# Patient Record
Sex: MALE | Race: WHITE | NOT HISPANIC OR LATINO | Employment: OTHER | URBAN - METROPOLITAN AREA
[De-identification: names, ages, dates, MRNs, and addresses within clinical notes are randomized per-mention and may not be internally consistent; named-entity substitution may affect disease eponyms.]

---

## 2017-02-28 ENCOUNTER — TELEPHONE (OUTPATIENT)
Dept: HEMATOLOGY ONCOLOGY | Facility: MEDICAL CENTER | Age: 77
End: 2017-02-28

## 2017-02-28 ENCOUNTER — APPOINTMENT (OUTPATIENT)
Dept: ADMISSIONS | Facility: MEDICAL CENTER | Age: 77
End: 2017-02-28
Attending: COLON & RECTAL SURGERY
Payer: MEDICARE

## 2017-02-28 NOTE — TELEPHONE ENCOUNTER
Left message to schedule new hematology appointment for Dr. Wright. Dx: history of prostate cancer, abnormal labs. Ref: Judith Chong PA-C.

## 2017-03-01 ENCOUNTER — HOSPITAL ENCOUNTER (OUTPATIENT)
Facility: MEDICAL CENTER | Age: 77
End: 2017-03-03
Attending: COLON & RECTAL SURGERY | Admitting: COLON & RECTAL SURGERY
Payer: MEDICARE

## 2017-03-01 PROBLEM — K40.90 RIGHT INGUINAL HERNIA: Status: ACTIVE | Noted: 2017-03-01

## 2017-03-01 PROBLEM — K43.2 INCISIONAL HERNIA: Status: ACTIVE | Noted: 2017-03-01

## 2017-03-01 PROCEDURE — 502240 HCHG MISC OR SUPPLY RC 0272: Performed by: COLON & RECTAL SURGERY

## 2017-03-01 PROCEDURE — 501838 HCHG SUTURE GENERAL: Performed by: COLON & RECTAL SURGERY

## 2017-03-01 PROCEDURE — 700111 HCHG RX REV CODE 636 W/ 250 OVERRIDE (IP)

## 2017-03-01 PROCEDURE — 501574 HCHG TROCAR, SMTH CAN&SEAL 5: Performed by: COLON & RECTAL SURGERY

## 2017-03-01 PROCEDURE — 501497 HCHG SURGICLIP: Performed by: COLON & RECTAL SURGERY

## 2017-03-01 PROCEDURE — 160035 HCHG PACU - 1ST 60 MINS PHASE I: Performed by: COLON & RECTAL SURGERY

## 2017-03-01 PROCEDURE — 502571 HCHG PACK, LAP CHOLE: Performed by: COLON & RECTAL SURGERY

## 2017-03-01 PROCEDURE — 110371 HCHG SHELL REV 272: Performed by: COLON & RECTAL SURGERY

## 2017-03-01 PROCEDURE — A9270 NON-COVERED ITEM OR SERVICE: HCPCS

## 2017-03-01 PROCEDURE — 501570 HCHG TROCAR, SEPARATOR: Performed by: COLON & RECTAL SURGERY

## 2017-03-01 PROCEDURE — C1781 MESH (IMPLANTABLE): HCPCS | Performed by: COLON & RECTAL SURGERY

## 2017-03-01 PROCEDURE — A9270 NON-COVERED ITEM OR SERVICE: HCPCS | Performed by: PHYSICIAN ASSISTANT

## 2017-03-01 PROCEDURE — 160003 HCHG RECOVERY MINUTES (EXTENDED) STAT: Performed by: COLON & RECTAL SURGERY

## 2017-03-01 PROCEDURE — 501568 HCHG TROCAR, BLUNTPORT 12MM: Performed by: COLON & RECTAL SURGERY

## 2017-03-01 PROCEDURE — 700102 HCHG RX REV CODE 250 W/ 637 OVERRIDE(OP): Performed by: PHYSICIAN ASSISTANT

## 2017-03-01 PROCEDURE — 160039 HCHG SURGERY MINUTES - EA ADDL 1 MIN LEVEL 3: Performed by: COLON & RECTAL SURGERY

## 2017-03-01 PROCEDURE — 110382 HCHG SHELL REV 271: Performed by: COLON & RECTAL SURGERY

## 2017-03-01 PROCEDURE — 160002 HCHG RECOVERY MINUTES (STAT): Performed by: COLON & RECTAL SURGERY

## 2017-03-01 PROCEDURE — 700102 HCHG RX REV CODE 250 W/ 637 OVERRIDE(OP)

## 2017-03-01 PROCEDURE — 503332 HCHG DEVICE ABSORB STRAP FIXA: Performed by: COLON & RECTAL SURGERY

## 2017-03-01 PROCEDURE — 160009 HCHG ANES TIME/MIN: Performed by: COLON & RECTAL SURGERY

## 2017-03-01 PROCEDURE — G0378 HOSPITAL OBSERVATION PER HR: HCPCS

## 2017-03-01 PROCEDURE — 700101 HCHG RX REV CODE 250: Performed by: PHYSICIAN ASSISTANT

## 2017-03-01 PROCEDURE — 160036 HCHG PACU - EA ADDL 30 MINS PHASE I: Performed by: COLON & RECTAL SURGERY

## 2017-03-01 PROCEDURE — 160048 HCHG OR STATISTICAL LEVEL 1-5: Performed by: COLON & RECTAL SURGERY

## 2017-03-01 PROCEDURE — A4606 OXYGEN PROBE USED W OXIMETER: HCPCS | Performed by: COLON & RECTAL SURGERY

## 2017-03-01 PROCEDURE — 160028 HCHG SURGERY MINUTES - 1ST 30 MINS LEVEL 3: Performed by: COLON & RECTAL SURGERY

## 2017-03-01 PROCEDURE — 700101 HCHG RX REV CODE 250

## 2017-03-01 DEVICE — MESH VENTRALIGHT 4 X 6 INCH - (1EA/CA): Type: IMPLANTABLE DEVICE | Status: FUNCTIONAL

## 2017-03-01 DEVICE — MESH PERFIX PLUG LARGE - 4.1CM X 4.8CM (1EA/CA): Type: IMPLANTABLE DEVICE | Status: FUNCTIONAL

## 2017-03-01 DEVICE — MESH 3D MAX RIGHT 10.8 X 16CM - MED(1/CA): Type: IMPLANTABLE DEVICE | Status: FUNCTIONAL

## 2017-03-01 RX ORDER — TEMAZEPAM 15 MG/1
15 CAPSULE ORAL
Status: DISCONTINUED | OUTPATIENT
Start: 2017-03-01 | End: 2017-03-03 | Stop reason: HOSPADM

## 2017-03-01 RX ORDER — DEXTROSE MONOHYDRATE, SODIUM CHLORIDE, SODIUM LACTATE, POTASSIUM CHLORIDE, CALCIUM CHLORIDE 5; 600; 310; 179; 20 G/100ML; MG/100ML; MG/100ML; MG/100ML; MG/100ML
INJECTION, SOLUTION INTRAVENOUS CONTINUOUS
Status: DISCONTINUED | OUTPATIENT
Start: 2017-03-01 | End: 2017-03-03 | Stop reason: HOSPADM

## 2017-03-01 RX ORDER — OXYCODONE AND ACETAMINOPHEN 7.5; 325 MG/1; MG/1
1 TABLET ORAL EVERY 4 HOURS PRN
Status: DISCONTINUED | OUTPATIENT
Start: 2017-03-01 | End: 2017-03-03 | Stop reason: HOSPADM

## 2017-03-01 RX ORDER — ENALAPRILAT 1.25 MG/ML
2.5 INJECTION INTRAVENOUS EVERY 4 HOURS PRN
Status: DISCONTINUED | OUTPATIENT
Start: 2017-03-01 | End: 2017-03-03 | Stop reason: HOSPADM

## 2017-03-01 RX ORDER — BACITRACIN 50000 [IU]/1
INJECTION, POWDER, FOR SOLUTION INTRAMUSCULAR
Status: DISCONTINUED | OUTPATIENT
Start: 2017-03-01 | End: 2017-03-01 | Stop reason: HOSPADM

## 2017-03-01 RX ORDER — SODIUM CHLORIDE, SODIUM LACTATE, POTASSIUM CHLORIDE, CALCIUM CHLORIDE 600; 310; 30; 20 MG/100ML; MG/100ML; MG/100ML; MG/100ML
1000 INJECTION, SOLUTION INTRAVENOUS
Status: COMPLETED | OUTPATIENT
Start: 2017-03-01 | End: 2017-03-01

## 2017-03-01 RX ORDER — OXYCODONE HCL 5 MG/5 ML
SOLUTION, ORAL ORAL
Status: COMPLETED
Start: 2017-03-01 | End: 2017-03-01

## 2017-03-01 RX ORDER — ACETAMINOPHEN 650 MG/1
650 SUPPOSITORY RECTAL EVERY 6 HOURS PRN
Status: DISCONTINUED | OUTPATIENT
Start: 2017-03-01 | End: 2017-03-03 | Stop reason: HOSPADM

## 2017-03-01 RX ORDER — HYDROCODONE BITARTRATE AND ACETAMINOPHEN 5; 325 MG/1; MG/1
1-2 TABLET ORAL EVERY 4 HOURS PRN
Status: DISCONTINUED | OUTPATIENT
Start: 2017-03-01 | End: 2017-03-03 | Stop reason: HOSPADM

## 2017-03-01 RX ORDER — ONDANSETRON 2 MG/ML
4 INJECTION INTRAMUSCULAR; INTRAVENOUS EVERY 6 HOURS PRN
Status: DISCONTINUED | OUTPATIENT
Start: 2017-03-01 | End: 2017-03-03 | Stop reason: HOSPADM

## 2017-03-01 RX ORDER — DIPHENHYDRAMINE HYDROCHLORIDE 50 MG/ML
25-50 INJECTION INTRAMUSCULAR; INTRAVENOUS EVERY 6 HOURS PRN
Status: DISCONTINUED | OUTPATIENT
Start: 2017-03-01 | End: 2017-03-03 | Stop reason: HOSPADM

## 2017-03-01 RX ORDER — DIPHENHYDRAMINE HCL 25 MG
25-50 TABLET ORAL EVERY 6 HOURS PRN
Status: DISCONTINUED | OUTPATIENT
Start: 2017-03-01 | End: 2017-03-03 | Stop reason: HOSPADM

## 2017-03-01 RX ORDER — BUPIVACAINE HYDROCHLORIDE AND EPINEPHRINE 5; 5 MG/ML; UG/ML
INJECTION, SOLUTION EPIDURAL; INTRACAUDAL; PERINEURAL
Status: DISCONTINUED | OUTPATIENT
Start: 2017-03-01 | End: 2017-03-01 | Stop reason: HOSPADM

## 2017-03-01 RX ORDER — TEMAZEPAM 15 MG/1
30 CAPSULE ORAL
Status: DISCONTINUED | OUTPATIENT
Start: 2017-03-01 | End: 2017-03-03 | Stop reason: HOSPADM

## 2017-03-01 RX ADMIN — HYDROMORPHONE HYDROCHLORIDE 0.5 MG: 1 INJECTION, SOLUTION INTRAMUSCULAR; INTRAVENOUS; SUBCUTANEOUS at 16:38

## 2017-03-01 RX ADMIN — DEXTROSE MONOHYDRATE, SODIUM CHLORIDE, SODIUM LACTATE, POTASSIUM CHLORIDE, CALCIUM CHLORIDE: 5; 600; 310; 179; 20 INJECTION, SOLUTION INTRAVENOUS at 21:02

## 2017-03-01 RX ADMIN — FENTANYL CITRATE 50 MCG: 50 INJECTION, SOLUTION INTRAMUSCULAR; INTRAVENOUS at 16:27

## 2017-03-01 RX ADMIN — OXYCODONE AND ACETAMINOPHEN 1 TABLET: 7.5; 325 TABLET ORAL at 21:01

## 2017-03-01 RX ADMIN — SODIUM CHLORIDE, SODIUM LACTATE, POTASSIUM CHLORIDE, CALCIUM CHLORIDE 1000 ML: 600; 310; 30; 20 INJECTION, SOLUTION INTRAVENOUS at 12:45

## 2017-03-01 RX ADMIN — OXYCODONE HYDROCHLORIDE 10 MG: 5 SOLUTION ORAL at 16:27

## 2017-03-01 ASSESSMENT — PAIN SCALES - GENERAL
PAINLEVEL_OUTOF10: 3
PAINLEVEL_OUTOF10: 5
PAINLEVEL_OUTOF10: 7
PAINLEVEL_OUTOF10: 3
PAINLEVEL_OUTOF10: 2
PAINLEVEL_OUTOF10: 0
PAINLEVEL_OUTOF10: 4
PAINLEVEL_OUTOF10: 2
PAINLEVEL_OUTOF10: 4
PAINLEVEL_OUTOF10: 2
PAINLEVEL_OUTOF10: 3
PAINLEVEL_OUTOF10: 7
PAINLEVEL_OUTOF10: 3
PAINLEVEL_OUTOF10: 3

## 2017-03-01 ASSESSMENT — PATIENT HEALTH QUESTIONNAIRE - PHQ9
2. FEELING DOWN, DEPRESSED, IRRITABLE, OR HOPELESS: NOT AT ALL
SUM OF ALL RESPONSES TO PHQ QUESTIONS 1-9: 0
SUM OF ALL RESPONSES TO PHQ9 QUESTIONS 1 AND 2: 0
1. LITTLE INTEREST OR PLEASURE IN DOING THINGS: NOT AT ALL

## 2017-03-01 ASSESSMENT — LIFESTYLE VARIABLES
EVER_SMOKED: NEVER
ALCOHOL_USE: NO

## 2017-03-01 NOTE — IP AVS SNAPSHOT
3/3/2017          Wally Joel  [address]  [place] [address]    Dear Wally:    [facility] wants to ensure your discharge home is safe and you or your loved ones have had all your questions answered regarding your care after you leave the hospital.    You may receive a telephone call within two days of your discharge.  This call is to make certain you understand your discharge instructions as well as ensure we provided you with the best care possible during your stay with us.     The call will only last approximately 3-5 minutes and will be done by a nurse.    Once again, we want to ensure your discharge home is safe and that you have a clear understanding of any next steps in your care.  If you have any questions or concerns, please do not hesitate to contact us, we are here for you.  Thank you for choosing [facility] for your healthcare needs.    Sincerely,    Ke Bobby    [facility]

## 2017-03-01 NOTE — IP AVS SNAPSHOT
" <p align=\"LEFT\"><IMG SRC=\"[email or web address]\" alt=\"Image\" WIDTH=\"50%\" HEIGHT=\"200\" BORDER=\"\"></p>                   Name:Wally Joel  Medical Record Number:[id number]  CSN: [id number]    YOB: 1940   Age: 76 y.o.  Sex: male  HT:1.651 m (5' 5\") WT: 79.4 kg (175 lb 0.7 oz)          Admit Date: 3/1/2017     Discharge Date:   Today's Date: 3/3/2017  Attending Doctor:  Kory Suero M.D.                  Allergies:  Ciprofloxacin          Follow-up Information     1. Follow up with Kory Suero M.D.. Schedule an appointment as soon as possible for a visit in 2 weeks.    Specialties:  Surgery, Radiology    Contact information    [address]  [facility] [address]  [phone]           Medication List      Take these Medications        Instructions    ADVIL PO    Take 1 tablet by mouth as needed.   Dose:  1 tablet       hydrocodone-acetaminophen 5-325 MG Tabs per tablet   Commonly known as:  NORCO    Take 1-2 Tabs by mouth every four hours as needed (pain).   Dose:  1-2 Tab       VITAMIN B 12 PO    Take  by mouth as needed.       VITAMIN C PO    Take  by mouth as needed.         "

## 2017-03-01 NOTE — IP AVS SNAPSHOT
" Home Care Instructions                                                                                                                  Name:Wally Joel  Medical Record Number:[id number]  CSN: [id number]    YOB: 1940   Age: 76 y.o.  Sex: male  HT:1.651 m (5' 5\") WT: 79.4 kg (175 lb 0.7 oz)          Admit Date: 3/1/2017     Discharge Date:   Today's Date: 3/3/2017  Attending Doctor:  Kory Suero M.D.                  Allergies:  Ciprofloxacin            Discharge Instructions       Discharge Instructions      1. ACTIVITIES: Upon discharge from the hospital, the day of surgery it is requested that you do no significant physical activity and limit mental activities, as you have had sedation. The day after surgery, you may resume activities of daily living, but for four weeks, it is recommended that you do no strenuous activities or heavy lifting (greater than 15 pounds).     2. DRIVING: You may drive whenever you are off pain medications and are able to perform the activities needed to drive, i.e. turning, bending, twisting, etc.     3. WOUND: It is not unusual for patients to experience swelling and even bruising at the hernia repair site.     4. ICE: please use ice on the wound to decrease the swelling for the first 24 hours and then discontinue.     5. BATHING: The dressing can be removed two days after surgery and you may then allow the wound to get wet in a shower as normal, but avoid submersion in water (tub bath) for at least a week. Please leave the steri-strips in place, they will fall off over 5-7 days.     6. PAIN MEDICATION: You will be given a prescription for pain medication at discharge. Please take these as directed. It is important to remember not to take medications on an empty stomach as this may cause nausea.     7. BOWEL FUNCTION: After hernia repair, it is not uncommon for patients to experience constipation. This is due to decreasing activity levels as well as pain " medications. You may wish to use a stool softener beginning immediately after surgery, and you may or may not need to use a laxative (Miralax, Milk of Magnesia, Ex-lax; Senokot, etc.) as well.     8. CALL IF YOU HAVE: (1) Fevers to more than 101.00 F, (2) Unusual chest or leg pain, (3) Drainage or fluid from incision that may be foul smelling, increased tenderness or soreness at the wound or the wound edges are no longer together, redness or swelling at the incision site. Please do not hesitate to call with any other questions.     9. APPOINTMENT: Contact our office at [phone] for a follow-up appointment in 1 to 2 weeks following your procedure.     If you have any additional questions, please do not hesitate to call the office and speak to either myself or the physician on call.     Inguinal Hernia, Adult  Muscles help keep everything in the body in its proper place. But if a weak spot in the muscles develops, something can poke through. That is called a hernia. When this happens in the lower part of the belly (abdomen), it is called an inguinal hernia. (It takes its name from a part of the body in this region called the inguinal canal.) A weak spot in the wall of muscles lets some fat or part of the small intestine bulge through. An inguinal hernia can develop at any age. Men get them more often than women.  CAUSES   In adults, an inguinal hernia develops over time.  · It can be triggered by:  ¨ Suddenly straining the muscles of the lower abdomen.  ¨ Lifting heavy objects.  ¨ Straining to have a bowel movement. Difficult bowel movements (constipation) can lead to this.  ¨ Constant coughing. This may be caused by smoking or lung disease.  ¨ Being overweight.  ¨ Being pregnant.  ¨ Working at a job that requires long periods of standing or heavy lifting.  ¨ Having had an inguinal hernia before.  One type can be an emergency situation. It is called a strangulated inguinal hernia. It develops if part of the small  intestine slips through the weak spot and cannot get back into the abdomen. The blood supply can be cut off. If that happens, part of the intestine may die. This situation requires emergency surgery.  SYMPTOMS   Often, a small inguinal hernia has no symptoms. It is found when a healthcare provider does a physical exam. Larger hernias usually have symptoms.   · In adults, symptoms may include:  ¨ A lump in the groin. This is easier to see when the person is standing. It might disappear when lying down.  ¨ In men, a lump in the scrotum.  ¨ Pain or burning in the groin. This occurs especially when lifting, straining or coughing.  ¨ A dull ache or feeling of pressure in the groin.  · Signs of a strangulated hernia can include:  ¨ A bulge in the groin that becomes very painful and tender to the touch.  ¨ A bulge that turns red or purple.  ¨ Fever, nausea and vomiting.  ¨ Inability to have a bowel movement or to pass gas.  DIAGNOSIS   To decide if you have an inguinal hernia, a healthcare provider will probably do a physical examination.  · This will include asking questions about any symptoms you have noticed.  · The healthcare provider might feel the groin area and ask you to cough. If an inguinal hernia is felt, the healthcare provider may try to slide it back into the abdomen.  · Usually no other tests are needed.  TREATMENT   Treatments can vary. The size of the hernia makes a difference. Options include:  · Watchful waiting. This is often suggested if the hernia is small and you have had no symptoms.  ¨ No medical procedure will be done unless symptoms develop.  ¨ You will need to watch closely for symptoms. If any occur, contact your healthcare provider right away.  · Surgery. This is used if the hernia is larger or you have symptoms.  ¨ Open surgery. This is usually an outpatient procedure (you will not stay overnight in a hospital). An cut (incision) is made through the skin in the groin. The hernia is put back  "inside the abdomen. The weak area in the muscles is then repaired by herniorrhaphy or hernioplasty. Herniorrhaphy: in this type of surgery, the weak muscles are sewn back together. Hernioplasty: a patch or mesh is used to close the weak area in the abdominal wall.  ¨ Laparoscopy. In this procedure, a surgeon makes small incisions. A thin tube with a tiny video camera (called a laparoscope) is put into the abdomen. The surgeon repairs the hernia with mesh by looking with the video camera and using two long instruments.  HOME CARE INSTRUCTIONS   · After surgery to repair an inguinal hernia:  ¨ You will need to take pain medicine prescribed by your healthcare provider. Follow all directions carefully.  ¨ You will need to take care of the wound from the incision.  ¨ Your activity will be restricted for awhile. This will probably include no heavy lifting for several weeks. You also should not do anything too active for a few weeks. When you can return to work will depend on the type of job that you have.  · During \"watchful waiting\" periods, you should:  ¨ Maintain a healthy weight.  ¨ Eat a diet high in fiber (fruits, vegetables and whole grains).  ¨ Drink plenty of fluids to avoid constipation. This means drinking enough water and other liquids to keep your urine clear or pale yellow.  ¨ Do not lift heavy objects.  ¨ Do not stand for long periods of time.  ¨ Quit smoking. This should keep you from developing a frequent cough.  SEEK MEDICAL CARE IF:   · A bulge develops in your groin area.  · You feel pain, a burning sensation or pressure in the groin. This might be worse if you are lifting or straining.  · You develop a fever of more than 100.5° F (38.1° C).  SEEK IMMEDIATE MEDICAL CARE IF:   · Pain in the groin increases suddenly.  · A bulge in the groin gets bigger suddenly and does not go down.  · For men, there is sudden pain in the scrotum. Or, the size of the scrotum increases.  · A bulge in the groin area " becomes red or purple and is painful to touch.  · You have nausea or vomiting that does not go away.  · You feel your heart beating much faster than normal.  · You cannot have a bowel movement or pass gas.  · You develop a fever of more than 102.0° F (38.9° C).     This information is not intended to replace advice given to you by your health care provider. Make sure you discuss any questions you have with your health care provider.     Document Released: 05/06/2010 Document Revised: 03/11/2013 Document Reviewed: 05/06/2010  KidBook Interactive Patient Education ©2016 [employer].  Hernia Repair  Care After  These instructions give you information on caring for yourself after your procedure. Your doctor may also give you more specific instructions. Call your doctor if you have any problems or questions after your procedure.  HOME CARE   · You may have changes in your poops (bowel movements).  · You may have loose or watery poop (diarrhea).  · You may be not able to poop.  · Your bowels will slowly get back to normal.  · Do not eat any food that makes you sick to your stomach (nauseous). Eat small meals 4 to 6 times a day instead of 3 large ones.  · Do not drink pop. It will give you gas.  · Do not drink alcohol.  · Do not lift anything heavier than 10 pounds. This is about the weight of a gallon of milk.  · Do not do anything that makes you very tired for at least 6 weeks.  · Do not get your wound wet for 2 days.  · You may take a sponge bath during this time.  · After 2 days you may take a shower. Gently pat your surgical cut (incision) dry with a towel. Do not rub it.  · For men: You may have been given an athletic supporter (scrotal support) before you left the hospital. It holds your scrotum and testicles closer to your body so there is no strain on your wound. Wear the supporter until your doctor tells you that you do not need it anymore.  GET HELP RIGHT AWAY IF:  · You have watery poop, or cannot poop for  more than 3 days.  · You feel sick to your stomach or throw up (vomit) more than 2 or 3 times.  · You have temperature by mouth above 102° F (38.9° C).  · You see redness or puffiness (swelling) around your wound.  · You see yellowish white fluid (pus) coming from your wound.  · You see a bulge or bump in your lower belly (abdomen) or near your groin.  · You develop a rash, trouble breathing, or any other symptoms from medicines taken.  MAKE SURE YOU:  · Understand these instructions.  · Will watch your condition.  · Will get help right away if your are not doing well or get worse.  Document Released: 11/30/2009 Document Revised: 03/11/2013 Document Reviewed: 11/30/2009  SpotlessCity® Patient Information ©2014 [employer].      Discharged to home by car with relative. Discharged via wheelchair, hospital escort: Yes.  Special equipment needed: Not Applicable    Be sure to schedule a follow-up appointment with your primary care doctor or any specialists as instructed.     Discharge Plan:   Influenza Vaccine Indication: Patient Refuses    I understand that a diet low in cholesterol, fat, and sodium is recommended for good health. Unless I have been given specific instructions below for another diet, I accept this instruction as my diet prescription.   Other diet: Regular diet    Special Instructions: None    · Is patient discharged on Warfarin / Coumadin?   No     · Is patient Post Blood Transfusion?  No      Depression / Suicide Risk    As you are discharged from this [facility] [place] [facility] facility, it is important to learn how to keep safe from harming yourself.    Recognize the warning signs:  · Abrupt changes in personality, positive or negative- including increase in energy   · Giving away possessions  · Change in eating patterns- significant weight changes-  positive or negative  · Change in sleeping patterns- unable to sleep or sleeping all the time   · Unwillingness or inability to communicate  · Depression  · Unusual  sadness, discouragement and loneliness  · Talk of wanting to die  · Neglect of personal appearance   · Rebelliousness- reckless behavior  · Withdrawal from people/activities they love  · Confusion- inability to concentrate     If you or a loved one observes any of these behaviors or has concerns about self-harm, here's what you can do:  · Talk about it- your feelings and reasons for harming yourself  · Remove any means that you might use to hurt yourself (examples: pills, rope, extension cords, firearm)  · Get professional help from the community (Mental Health, Substance Abuse, psychological counseling)  · Do not be alone:Call your Safe Contact- someone whom you trust who will be there for you.  · Call your local CRISIS HOTLINE 015-2571 or 810-614-3584  · Call your local Children's Mobile Crisis Response Team [place] (120) 042-8720 or www.[employer]  · Call the toll free National Suicide Prevention Hotlines   · National Suicide Prevention Lifeline 786-804-NWZA (1286)  · [employer] Hope Line Network 800-SUICIDE (482-8193)        Follow-up Information     1. Follow up with Kory Suero M.D.. Schedule an appointment as soon as possible for a visit in 2 weeks.    Specialties:  Surgery, Radiology    Contact information    [address] [facility] [address]  [place] [address]  [phone]           Discharge Medication Instructions:    Below are the medications your physician expects you to take upon discharge:    Review all your home medications and newly ordered medications with your doctor and/or pharmacist. Follow medication instructions as directed by your doctor and/or pharmacist.    Please keep your medication list with you and share with your physician.               Medication List      START taking these medications        Instructions    hydrocodone-acetaminophen 5-325 MG Tabs per tablet   Last time this was given:  2 Tabs on 3/3/2017  6:46 AM   Commonly known as:  NORCO    Take 1-2 Tabs by mouth every four  hours as needed (pain).   Dose:  1-2 Tab         CONTINUE taking these medications        Instructions    ADVIL PO    Take 1 tablet by mouth as needed.   Dose:  1 tablet       VITAMIN B 12 PO    Take  by mouth as needed.       VITAMIN C PO    Take  by mouth as needed.               Instructions           Diet / Nutrition:    Follow any diet instructions given to you by your doctor or the dietician, including how much salt (sodium) you are allowed each day.    If you are overweight, talk to your doctor about a weight reduction plan.    Activity:    Remain physically active following your doctor's instructions about exercise and activity.    Rest often.     Any time you become even a little tired or short of breath, SIT DOWN and rest.    Worsening Symptoms:    Report any of the following signs and symptoms to the doctor's office immediately:    *Pain of jaw, arm, or neck  *Chest pain not relieved by medication                               *Dizziness or loss of consciousness  *Difficulty breathing even when at rest   *More tired than usual                                       *Bleeding drainage or swelling of surgical site  *Swelling of feet, ankles, legs or stomach                 *Fever (>100ºF)  *Pink or blood tinged sputum  *Weight gain (3lbs/day or 5lbs /week)           *Shock from internal defibrillator (if applicable)  *Palpitations or irregular heartbeats                *Cool and/or numb extremities    Stroke Awareness    Common Risk Factors for Stroke include:    Age  Atrial Fibrillation  Carotid Artery Stenosis  Diabetes Mellitus  Excessive alcohol consumption  High blood pressure  Overweight   Physical inactivity  Smoking    Warning signs and symptoms of a stroke include:    *Sudden numbness or weakness of the face, arm or leg (especially on one side of the body).  *Sudden confusion, trouble speaking or understanding.  *Sudden trouble seeing in one or both eyes.  *Sudden trouble walking, dizziness, loss of  balance or coordination.Sudden severe headache with no known cause.    It is very important to get treatment quickly when a stroke occurs. If you experience any of the above warning signs, call 911 immediately.                   Disclaimer         Quit Smoking / Tobacco Use:    I understand the use of any tobacco products increases my chance of suffering from future heart disease or stroke and could cause other illnesses which may shorten my life. Quitting the use of tobacco products is the single most important thing I can do to improve my health. For further information on smoking / tobacco cessation call a Toll Free Quit Line at 1-887.431.3092 (*National Cancer [place]) or 1-623.135.7605 (American Lung Association) or you can access the web based program at www.lungMyMundus.org.    [place] Tobacco Users Help Line:  (719) 131-4577       Toll Free: 1-690.128.5018  Quit Tobacco Program [facility] (958)913-3525    Crisis Hotline:    [place] Crisis Hotline:  2-335-KWAJUXW or 1-192.138.1872    [place] Crisis Hotline:    1-507.919.1058 or 421-942-5174    Discharge Survey:   Thank you for choosing [facility]. We hope we did everything we could to make your hospital stay a pleasant one. You may be receiving a phone survey and we would appreciate your time and participation in answering the questions. Your input is very valuable to us in our efforts to improve our service to our patients and their families.        My signature on this form indicates that:    1. I have reviewed and understand the above information.  2. My questions regarding this information have been answered to my satisfaction.  3. I have formulated a plan with my discharge nurse to obtain my prescribed medications for home.                  Disclaimer         __________________________________                     __________       ________                       Patient Signature                                                 Date                     Time

## 2017-03-01 NOTE — IP AVS SNAPSHOT
DivvyHQ Access Code: Activation code not generated  Current DivvyHQ Status: Patient Declined    [employer]IntroNet  A secure, online tool to manage your health information     [employer]’s DivvyHQ® is a secure, online tool that connects you to your personalized health information from the privacy of your home -- day or night - making it very easy for you to manage your healthcare. Once the activation process is completed, you can even access your medical information using the DivvyHQ mary, which is available for free in the Apple Mary store or Google Play store.     DivvyHQ provides the following levels of access (as shown below):   My Chart Features   [facility] Primary Care Doctor [facility]  Specialists [facility]  Urgent  Care Non-[facility]  Primary Care  Doctor   Email your healthcare team securely and privately 24/7 X X X X   Manage appointments: schedule your next appointment; view details of past/upcoming appointments X      Request prescription refills. X      View recent personal medical records, including lab and immunizations X X X X   View health record, including health history, allergies, medications X X X X   Read reports about your outpatient visits, procedures, consult and ER notes X X X X   See your discharge summary, which is a recap of your hospital and/or ER visit that includes your diagnosis, lab results, and care plan. X X       How to register for DivvyHQ:  1. Go to  [email or web address].  2. Click on the Sign Up Now box, which takes you to the New Member Sign Up page. You will need to provide the following information:  a. Enter your DivvyHQ Access Code exactly as it appears at the top of this page. (You will not need to use this code after you’ve completed the sign-up process. If you do not sign up before the expiration date, you must request a new code.)   b. Enter your date of birth.   c. Enter your home email address.   d. Click Submit, and follow the next screen’s instructions.  3. Create a DivvyHQ ID.  This will be your [employer] login ID and cannot be changed, so think of one that is secure and easy to remember.  4. Create a [employer] password. You can change your password at any time.  5. Enter your Password Reset Question and Answer. This can be used at a later time if you forget your password.   6. Enter your e-mail address. This allows you to receive e-mail notifications when new information is available in [employer].  7. Click Sign Up. You can now view your health information.    For assistance activating your [employer] account, call [phone]

## 2017-03-02 LAB
ALBUMIN SERPL BCP-MCNC: 3.4 G/DL (ref 3.2–4.9)
ALBUMIN/GLOB SERPL: 1.4 G/DL
ALP SERPL-CCNC: 60 U/L (ref 30–99)
ALT SERPL-CCNC: 16 U/L (ref 2–50)
ANION GAP SERPL CALC-SCNC: 7 MMOL/L (ref 0–11.9)
AST SERPL-CCNC: 13 U/L (ref 12–45)
BILIRUB SERPL-MCNC: 0.8 MG/DL (ref 0.1–1.5)
BUN SERPL-MCNC: 13 MG/DL (ref 8–22)
CALCIUM SERPL-MCNC: 8.8 MG/DL (ref 8.5–10.5)
CHLORIDE SERPL-SCNC: 104 MMOL/L (ref 96–112)
CO2 SERPL-SCNC: 24 MMOL/L (ref 20–33)
CREAT SERPL-MCNC: 1.1 MG/DL (ref 0.5–1.4)
ERYTHROCYTE [DISTWIDTH] IN BLOOD BY AUTOMATED COUNT: 48.7 FL (ref 35.9–50)
GFR SERPL CREATININE-BSD FRML MDRD: >60 ML/MIN/1.73 M 2
GLOBULIN SER CALC-MCNC: 2.5 G/DL (ref 1.9–3.5)
GLUCOSE SERPL-MCNC: 211 MG/DL (ref 65–99)
HCT VFR BLD AUTO: 42.5 % (ref 42–52)
HGB BLD-MCNC: 14.6 G/DL (ref 14–18)
MCH RBC QN AUTO: 30.4 PG (ref 27–33)
MCHC RBC AUTO-ENTMCNC: 34.4 G/DL (ref 33.7–35.3)
MCV RBC AUTO: 88.4 FL (ref 81.4–97.8)
PLATELET # BLD AUTO: 123 K/UL (ref 164–446)
PMV BLD AUTO: 11.8 FL (ref 9–12.9)
POTASSIUM SERPL-SCNC: 4.6 MMOL/L (ref 3.6–5.5)
PROT SERPL-MCNC: 5.9 G/DL (ref 6–8.2)
RBC # BLD AUTO: 4.81 M/UL (ref 4.7–6.1)
SODIUM SERPL-SCNC: 135 MMOL/L (ref 135–145)
WBC # BLD AUTO: 35.5 K/UL (ref 4.8–10.8)

## 2017-03-02 PROCEDURE — 700101 HCHG RX REV CODE 250: Performed by: PHYSICIAN ASSISTANT

## 2017-03-02 PROCEDURE — 700102 HCHG RX REV CODE 250 W/ 637 OVERRIDE(OP): Performed by: PHYSICIAN ASSISTANT

## 2017-03-02 PROCEDURE — 96374 THER/PROPH/DIAG INJ IV PUSH: CPT

## 2017-03-02 PROCEDURE — 700111 HCHG RX REV CODE 636 W/ 250 OVERRIDE (IP): Performed by: PHYSICIAN ASSISTANT

## 2017-03-02 PROCEDURE — 80053 COMPREHEN METABOLIC PANEL: CPT

## 2017-03-02 PROCEDURE — G0378 HOSPITAL OBSERVATION PER HR: HCPCS

## 2017-03-02 PROCEDURE — A9270 NON-COVERED ITEM OR SERVICE: HCPCS | Performed by: PHYSICIAN ASSISTANT

## 2017-03-02 PROCEDURE — 85027 COMPLETE CBC AUTOMATED: CPT

## 2017-03-02 PROCEDURE — 36415 COLL VENOUS BLD VENIPUNCTURE: CPT

## 2017-03-02 RX ADMIN — HYDROCODONE BITARTRATE AND ACETAMINOPHEN 2 TABLET: 5; 325 TABLET ORAL at 07:40

## 2017-03-02 RX ADMIN — FAMOTIDINE 20 MG: 10 INJECTION INTRAVENOUS at 09:34

## 2017-03-02 RX ADMIN — DEXTROSE MONOHYDRATE, SODIUM CHLORIDE, SODIUM LACTATE, POTASSIUM CHLORIDE, CALCIUM CHLORIDE: 5; 600; 310; 179; 20 INJECTION, SOLUTION INTRAVENOUS at 18:15

## 2017-03-02 RX ADMIN — DEXTROSE MONOHYDRATE, SODIUM CHLORIDE, SODIUM LACTATE, POTASSIUM CHLORIDE, CALCIUM CHLORIDE: 5; 600; 310; 179; 20 INJECTION, SOLUTION INTRAVENOUS at 01:42

## 2017-03-02 RX ADMIN — DEXTROSE MONOHYDRATE, SODIUM CHLORIDE, SODIUM LACTATE, POTASSIUM CHLORIDE, CALCIUM CHLORIDE: 5; 600; 310; 179; 20 INJECTION, SOLUTION INTRAVENOUS at 10:30

## 2017-03-02 RX ADMIN — HYDROCODONE BITARTRATE AND ACETAMINOPHEN 2 TABLET: 5; 325 TABLET ORAL at 13:40

## 2017-03-02 RX ADMIN — HYDROCODONE BITARTRATE AND ACETAMINOPHEN 2 TABLET: 5; 325 TABLET ORAL at 01:42

## 2017-03-02 ASSESSMENT — PAIN SCALES - GENERAL
PAINLEVEL_OUTOF10: 4
PAINLEVEL_OUTOF10: 5
PAINLEVEL_OUTOF10: 7
PAINLEVEL_OUTOF10: 3
PAINLEVEL_OUTOF10: 2
PAINLEVEL_OUTOF10: 5

## 2017-03-02 ASSESSMENT — ENCOUNTER SYMPTOMS
VOMITING: 0
NAUSEA: 0
DIARRHEA: 0
HEARTBURN: 0
ABDOMINAL PAIN: 1
SPUTUM PRODUCTION: 0
CHILLS: 0
COUGH: 0
FEVER: 0

## 2017-03-02 NOTE — DISCHARGE PLANNING
Care Transition Team Assessment     Pt is a 76 year old male, who was admitted on 3/1/17 for a scheduled hernia repair with Dr. uSero.     SW met with pt at bedside to complete assessment. Pt lives alone in a single story house in [place], [place]. Pt has no living relatives. SW provided pt a Advanced Directive packet to complete. Pt's support system is his friend Melo Person. Pt state Melo will transport him home when he is medically cleared. SW will continue to follow for any discharge planning needs.     Information Source  Orientation : Oriented x 4  Information Given By: Patient  Informant's Name: Wally Joel  Who is responsible for making decisions for patient? : Patient    Readmission Evaluation  Is this a readmission?: No    Elopement Risk  Legal Hold: No    Interdisciplinary Discharge Planning  Primary Care Physician: none  Lives with - Patient's Self Care Capacity: Alone and Able to Care For Self  Support Systems: Friends / Neighbors  Housing / Facility: [address]  Able to Return to Previous ADL's: Yes  Mobility Issues: No  Prior Services: None  Patient Expects to be Discharged to:: Home  Assistance Needed: No  Durable Medical Equipment: Not Applicable    Discharge Preparedness  What is your plan after discharge?: Home with help  What are your discharge supports?:  (Friend)  Prior Functional Level: Ambulatory, Drives Self, Independent with Activities of Daily Living    Functional Assesment  Prior Functional Level: Ambulatory, Drives Self, Independent with Activities of Daily Living    Finances  Financial Barriers to Discharge: No  Prescription Coverage: No  Prescription Coverage Comments: Pt states he does not have prescription coverage.    Vision / Hearing Impairment  Vision Impairment : Yes  Right Eye Vision: Wears Glasses  Left Eye Vision: Wears Glasses  Hearing Impairment : No    Values / Beliefs / Concerns  Values / Beliefs Concerns : No  Spiritual Requests During Hospitalization: No    Advance  Directive  Advance Directive?: None  Advance Directive offered?: AD Booklet given    Domestic Abuse  Have you ever been the victim of abuse or violence?: No  Physical Abuse or Sexual Abuse: No  Verbal Abuse or Emotional Abuse: No  Possible Abuse Reported to:: Not Applicable    Psychological Assessment  History of Substance Abuse: None  History of Psychiatric Problems: No  Non-compliant with Treatment: No  Newly Diagnosed Illness: No    Discharge Risks or Barriers  Discharge risks or barriers?: No PCP  Patient risk factors: No PCP    Anticipated Discharge Information  Anticipated discharge disposition: Home  Discharge Address: [address] [place] [address], [place], [place] [address]  Discharge Contact Phone Number: [phone]

## 2017-03-02 NOTE — PROGRESS NOTES
Patient was bladder scanned at 2226 result 664 ml. Patient stated that he started having sensation in his bladder and did not want a roman at that. Patient was straight cath and 825 ml clear urine returned. At 0312 patient was bladder scanned and result was 500 ml. Patient also voided 150 ml in urinal. Roman was inserted and 700 ml clear urine returned. At approximately 0545 patient was ambulating in hallway with cna when she observed that there was blood in the catheter. Patient was returned to his room and back to bed. Dr. Suero was called regarding labs. Judith returned call. She was updated on the WBC of 35.5 and also notified of hematuria. She stated that they will be in the hospital later today and will visit patient.

## 2017-03-02 NOTE — PROGRESS NOTES
Surgical Progress Note    Author: Judith Chong Date & Time created: 3/2/2017   7:38 AM     Interval Events:  Pt doing well POD #1 s/p hernia repair. Pt has had some trouble with urinary retention and hematuria. Roman was inserted last night. Plan to remove roman this afternoon. Pt denies nausea, vomiting. Tolerating PO liquids. Ambulating. Pt is alert and oriented. NAD. Breathing unlabored. Abdomen soft, somewhat distended, tender at incision sites. Labs reviewed. VS reviewed. Elevated WBC's, pt has CLL. Pt has chronic urinary problems with hematuria which he is seeing urology for.  Review of Systems   Constitutional: Negative for fever and chills.   Respiratory: Negative for cough and sputum production.    Gastrointestinal: Positive for abdominal pain. Negative for heartburn, nausea, vomiting and diarrhea.   Genitourinary: Positive for hematuria.   Skin: Negative for itching and rash.     Hemodynamics:  Temp (24hrs), Av.5 °C (97.7 °F), Min:36 °C (96.8 °F), Max:36.9 °C (98.4 °F)  Temperature: 36.6 °C (97.9 °F)  Pulse  Av.3  Min: 68  Max: 89Heart Rate (Monitored): 84  Blood Pressure : 117/66 mmHg, NIBP: 132/78 mmHg     Respiratory:    Respiration: 16, Pulse Oximetry: 96 %     Work Of Breathing / Effort: Mild     Neuro:  GCS       Fluids:    Intake/Output Summary (Last 24 hours) at 17 0738  Last data filed at 17 0556   Gross per 24 hour   Intake   2890 ml   Output   2750 ml   Net    140 ml     Weight: 79.4 kg (175 lb 0.7 oz)  Current Diet Order   Procedures   • DIET ORDER     Physical Exam   Constitutional: He is oriented to person, place, and time. He appears well-developed and well-nourished. No distress.   Cardiovascular: Normal rate.    Pulmonary/Chest: Effort normal. No respiratory distress.   Abdominal: Soft. He exhibits distension (mild). He exhibits no mass. There is tenderness (mild). There is no rebound and no guarding.   Neurological: He is alert and oriented to person, place, and time.    Skin: Skin is warm and dry. No rash noted. He is not diaphoretic. No erythema.   Psychiatric: He has a normal mood and affect. His behavior is normal.   Vitals reviewed.    Labs:  Recent Results (from the past 24 hour(s))   CBC WITHOUT DIFFERENTIAL IN AM POD #1    Collection Time: 03/02/17  4:00 AM   Result Value Ref Range    WBC 35.5 (HH) 4.8 - 10.8 K/uL    RBC 4.81 4.70 - 6.10 M/uL    Hemoglobin 14.6 14.0 - 18.0 g/dL    Hematocrit 42.5 42.0 - 52.0 %    MCV 88.4 81.4 - 97.8 fL    MCH 30.4 27.0 - 33.0 pg    MCHC 34.4 33.7 - 35.3 g/dL    RDW 48.7 35.9 - 50.0 fL    Platelet Count 123 (L) 164 - 446 K/uL    MPV 11.8 9.0 - 12.9 fL   COMP METABOLIC PANEL     Collection Time: 03/02/17  4:00 AM   Result Value Ref Range    Sodium 135 135 - 145 mmol/L    Potassium 4.6 3.6 - 5.5 mmol/L    Chloride 104 96 - 112 mmol/L    Co2 24 20 - 33 mmol/L    Anion Gap 7.0 0.0 - 11.9    Glucose 211 (H) 65 - 99 mg/dL    Bun 13 8 - 22 mg/dL    Creatinine 1.10 0.50 - 1.40 mg/dL    Calcium 8.8 8.5 - 10.5 mg/dL    AST(SGOT) 13 12 - 45 U/L    ALT(SGPT) 16 2 - 50 U/L    Alkaline Phosphatase 60 30 - 99 U/L    Total Bilirubin 0.8 0.1 - 1.5 mg/dL    Albumin 3.4 3.2 - 4.9 g/dL    Total Protein 5.9 (L) 6.0 - 8.2 g/dL    Globulin 2.5 1.9 - 3.5 g/dL    A-G Ratio 1.4 g/dL   ESTIMATED GFR    Collection Time: 03/02/17  4:00 AM   Result Value Ref Range    GFR If African American >60 >60 mL/min/1.73 m 2    GFR If Non African American >60 >60 mL/min/1.73 m 2     Medical Decision Making, by Problem:  Active Hospital Problems    Diagnosis   • Incisional hernia [K43.2]   • Right inguinal hernia [K40.90]     Plan:  Pt doing well POD #1 s/p hernia repair. Pt has had some trouble with urinary retention and hematuria. Roman was inserted last night. Plan to remove roman this afternoon. Pt denies nausea, vomiting. Tolerating PO liquids. Ambulating. Pt is alert and oriented. NAD. Breathing unlabored. Abdomen soft, somewhat distended, tender at incision sites. Labs  reviewed. VS reviewed. Elevated WBC's, pt has CLL. Pt has chronic urinary problems with hematuria which he is seeing urology for. Plan to stay one more night. Pt also seen and examined by Dr. Suero.    Quality Measures:  Labs reviewed and Medications reviewed          DVT prophylaxis - mechanical: SCDs            Discussed patient condition with RN, Patient and Dr. Suero.

## 2017-03-02 NOTE — OP REPORT
NAME:  Wally Joel  MRN:  [id number]  :  1940      DATE OF OPERATION: 3/1/2017    PREOPERATIVE DIAGNOSIS: right Inguinal Hernia; incisional hernia    POSTOPERATIVE DIAGNOSIS: right Inguinal Hernia; incisional hernia with incarcerated omentum    OPERATION PERFORMED: 1.  Laparoscopic repair of right Inguinal Hernia with Mesh; 2. Laparoscopic repair of incarcerated incisional hernia with placement of mesh    SURGEON: Kory Suero MD    ASSISTANT:  Judith Chong PA-C    ANESTHESIOLOGIST:  Curt Timmons MD. , MD    ANESTHESIA: General endotracheal anesthesia.      SPECIMEN: None    ESTIMATED BLOOD LOSS: < 50cc.     INDICATIONS: The patient is a 76 y.o. male with a diagnosis of right groin bulge with right inguinal hernia. He is taken to the operating room today for laparoscopic repair of inguinal hernia with mesh.     PROCEDURE: Following informed consent, the patient was properly identified, taken to the operating room, and placed in the supine position where general endotracheal anesthesia was administered. Intravenous antibiotics were administered by the anesthesiologist in the correct time interval. Sequential compression devices were employed. The abdomen was prepped and draped into a sterile field.     A small infraumbilical skin incision was made and dissection was carried to the right of midline.  The anterior rectus abdominus fascia sheath was exposed and incised.  The preperitoneal plane was developed bluntly.  The Covidien dissecting trocar balloon instrument was then introduced into the preperitoneal space.  The hand pump was then used to create the preperitoneal dissection.    The balloon trocar was then removed and the CO2 insufflation and optical trocar was then advanced.  The CO2 insufflation was started and the laparoscope was introduced. This demonstrated a partial preperitoneal dissection and incarcerated bowel, making an intra-peritoneal approach most favorable.    Two additional  5mm trocars were then placed in the left abdomen.  Blunt dissectors were used to then dissect the right inguinal floor.  A large large sized direct inguinal hernia was present with the colon and hernia sac extending up through the inguinal canal.  The bowel and peritoneum were slowly reduced and gradually .  The cord lipoma was also reduced.  The vessels and structures were carefully protected and a nice inguinal floor dissection was accomplished.    The right contour mesh was soaked in Kantrex solution and was trimmed so that there was a generous keyhole, and a large mesh plug was introduced into the defect, secured with an absorable tacking device.  The mesh was then rolled and introduced into the preperitoneal space.  It was then maneuvered into position.  The mesh was then secured to the periosteum along Dylon's ligament and the pubic ramus with the absorbable tacking instrument.  It had very nice position obliterating the hernia defect without constriction of any structures.            The mesh was then held into place with the peritoneum then placed back over the mesh and tacked in place. The result was a very nice repair    Attention was then turned to the incisional hernia. A 12mm trocar was placed in the left abdomen, and the hernia inspected. Incarcerated omentum was reduced and the defect then seen to measure less than 3cm diameter. The 4in x 8in Ventralight mesh was trimmed a bit and soaked in antibiotic solution, and four 0 Vicryl sutures placed in the north, east, south, and west edges. Corresponding skin incisions were made and the mesh introduced. The endoclose was used to retrieve the Vicryl suture and secure the mesh to the abdominal wall with wide overlap of the fascial edges in each direction. Absorbable tacks further secured the mesh. .  The port were then removed under direct vision.  The port sites were then irrigated well.  The fascia was closed with O Vicryl suture.  The port site  skin incisions were closed with interrupted 4-0 Vicryl subcuticular sutures.  Steri-Strips and Benzoin were applied beneath sterile Band-Aids.     The patient tolerated the procedure well and there were no apparent complications. All sponge, needle, and instrument counts were correct on 2 separate occasions. He was awakened, extubated, and transferred to the recovery room in satisfactory condition.       ____________________________________   Kory Suero MD  DD: 3/1/2017  4:13 PM    CC:  [facility];

## 2017-03-02 NOTE — PROGRESS NOTES
Patient alert and oriented x 4. Arrived on unit via gurney. Patient ambulated from gurney in the hallway to the bed. 8 band aids intact to abdomen. Small amount of blood noted to band aids. Abdominal binder in place.  Call light within reach.

## 2017-03-03 VITALS
OXYGEN SATURATION: 90 % | HEART RATE: 78 BPM | BODY MASS INDEX: 29.16 KG/M2 | RESPIRATION RATE: 16 BRPM | SYSTOLIC BLOOD PRESSURE: 131 MMHG | DIASTOLIC BLOOD PRESSURE: 82 MMHG | TEMPERATURE: 97.8 F | HEIGHT: 65 IN | WEIGHT: 175.04 LBS

## 2017-03-03 PROCEDURE — A9270 NON-COVERED ITEM OR SERVICE: HCPCS | Performed by: PHYSICIAN ASSISTANT

## 2017-03-03 PROCEDURE — G0378 HOSPITAL OBSERVATION PER HR: HCPCS

## 2017-03-03 PROCEDURE — 700102 HCHG RX REV CODE 250 W/ 637 OVERRIDE(OP): Performed by: PHYSICIAN ASSISTANT

## 2017-03-03 PROCEDURE — 96376 TX/PRO/DX INJ SAME DRUG ADON: CPT

## 2017-03-03 PROCEDURE — 700101 HCHG RX REV CODE 250: Performed by: PHYSICIAN ASSISTANT

## 2017-03-03 PROCEDURE — 700111 HCHG RX REV CODE 636 W/ 250 OVERRIDE (IP): Performed by: PHYSICIAN ASSISTANT

## 2017-03-03 RX ORDER — HYDROCODONE BITARTRATE AND ACETAMINOPHEN 5; 325 MG/1; MG/1
1-2 TABLET ORAL EVERY 4 HOURS PRN
Qty: 40 TAB | Refills: 0 | Status: SHIPPED | OUTPATIENT
Start: 2017-03-03 | End: 2018-03-15

## 2017-03-03 RX ADMIN — HYDROCODONE BITARTRATE AND ACETAMINOPHEN 2 TABLET: 5; 325 TABLET ORAL at 06:46

## 2017-03-03 RX ADMIN — FAMOTIDINE 20 MG: 10 INJECTION INTRAVENOUS at 07:43

## 2017-03-03 RX ADMIN — DEXTROSE MONOHYDRATE, SODIUM CHLORIDE, SODIUM LACTATE, POTASSIUM CHLORIDE, CALCIUM CHLORIDE: 5; 600; 310; 179; 20 INJECTION, SOLUTION INTRAVENOUS at 03:03

## 2017-03-03 ASSESSMENT — ENCOUNTER SYMPTOMS
DIARRHEA: 0
CHILLS: 0
PALPITATIONS: 0
SHORTNESS OF BREATH: 0
HEARTBURN: 0
ABDOMINAL PAIN: 1
NAUSEA: 0
COUGH: 0
VOMITING: 0
FEVER: 0

## 2017-03-03 ASSESSMENT — PAIN SCALES - GENERAL
PAINLEVEL_OUTOF10: 3
PAINLEVEL_OUTOF10: 6

## 2017-03-03 NOTE — PROGRESS NOTES
Surgical Progress Note    Author: Latisha Lange Date & Time created: 3/3/2017   8:20 AM     Interval Events:  POD #2 Laparoscopic repair of right Inguinal Hernia with Mesh,. Laparoscopic repair of incarcerated incisional hernia with placement of mesh. Pt doing well, tolerating liquids. Admits to incisional abdominal pain. Pt is voiding after roman catheter removal yesterday and states that hematuria cleared up. +flatus. Pt is ambulating.    Review of Systems   Constitutional: Negative for fever and chills.   Respiratory: Negative for cough and shortness of breath.    Cardiovascular: Negative for chest pain and palpitations.   Gastrointestinal: Positive for abdominal pain (incisional). Negative for heartburn, nausea, vomiting and diarrhea.   Genitourinary: Negative for dysuria.        Voiding after roman D'cd yesterday.     Hemodynamics:  Temp (24hrs), Av.1 °C (98.7 °F), Min:36.6 °C (97.8 °F), Max:37.4 °C (99.4 °F)  Temperature: 36.6 °C (97.8 °F)  Pulse  Av.5  Min: 68  Max: 89   Blood Pressure : 131/82 mmHg     Respiratory:    Respiration: 16, Pulse Oximetry: 90 %     Work Of Breathing / Effort: Mild     Neuro:  GCS       Fluids:    Intake/Output Summary (Last 24 hours) at 17 0820  Last data filed at 17 0644   Gross per 24 hour   Intake   2395 ml   Output   4600 ml   Net  -2205 ml        Current Diet Order   Procedures   • DIET ORDER     Physical Exam   Constitutional: He is oriented to person, place, and time. He appears well-developed and well-nourished.   HENT:   Head: Normocephalic and atraumatic.   Eyes: Conjunctivae are normal.   Neck: Normal range of motion.   Cardiovascular: Normal rate and regular rhythm.    Pulmonary/Chest: Effort normal. No respiratory distress.   Abdominal: Soft. He exhibits no distension and no mass. There is tenderness (incisional). There is no rebound and no guarding.   Musculoskeletal: Normal range of motion.   Neurological: He is alert and oriented to person,  place, and time.   Skin: Skin is warm and dry. No rash noted. No erythema. No pallor.   Incisions C/D/I, abdominal binder   Psychiatric: He has a normal mood and affect.     Labs:  No results found for this or any previous visit (from the past 24 hour(s)).  Medical Decision Making, by Problem:  Active Hospital Problems    Diagnosis   • Incisional hernia [K43.2]   • Right inguinal hernia [K40.90]     Plan:  POD #2 Laparoscopic repair of right Inguinal Hernia with Mesh, Laparoscopic repair of incarcerated incisional hernia with placement of mesh. Pt is alert and oriented, NAD. Tolerating PO. +Flatus. Pt is voiding after roman catheter removal yesterday and states that hematuria cleared up.  Abdomen is soft, nondistended, mildly tender at incision sites. Incisions C/D/I. VS stable. Labs reviewed from yesterday, elevated WBC, pt has CLL. Encouraged ambulation. Pt okay for discharge today. Follow up with urology as outpatient. Follow up in our office in 1-2 weeks. Advance diet as tolerated to GI soft.  Discussed with Dr. Suero.      Quality Measures:  Labs reviewed  Roman catheter: No Roman        DVT prophylaxis - mechanical: SCDs            Discussed patient condition with RN, Patient and Dr. Suero

## 2017-03-03 NOTE — PROGRESS NOTES
Patient AOX4. Calm and in good spirits. Pain has been between a 4-6 throughout the day, has been given Norco 5/325 mg to relieve pain. On room air. Has head of bed >30 degrees. Reads and watches TV to stay busy. WBC is 35.5 and platelet count is 123, no trends for either of them. Has been passing flatulence and last BM was 03/01/17.

## 2017-03-03 NOTE — PROGRESS NOTES
Patient alert and oriented x 4. Band aids intact to 8 surgical sites. Abdominal binder in place.  Patient denied pain. Voiding adequately. Call light within reach, bed in lowest position.

## 2017-03-03 NOTE — PROGRESS NOTES
Pt discharge to home per Md order. AVS provided, prescription given. Discussed diet, activity, f/u appointment, wound care. Pt verbalized understanding. IV removed, no complications. Belongings taken with patient. Left the unit ambulatory, refused wheelchair. Accompanied by friends.

## 2017-03-03 NOTE — CARE PLAN
Problem: Safety  Goal: Will remain free from falls  Outcome: PROGRESSING AS EXPECTED    Problem: Knowledge Deficit  Goal: Knowledge of disease process/condition, treatment plan, diagnostic tests, and medications will improve  Outcome: PROGRESSING AS EXPECTED

## 2017-03-03 NOTE — DISCHARGE INSTRUCTIONS
Discharge Instructions      1. ACTIVITIES: Upon discharge from the hospital, the day of surgery it is requested that you do no significant physical activity and limit mental activities, as you have had sedation. The day after surgery, you may resume activities of daily living, but for four weeks, it is recommended that you do no strenuous activities or heavy lifting (greater than 15 pounds).     2. DRIVING: You may drive whenever you are off pain medications and are able to perform the activities needed to drive, i.e. turning, bending, twisting, etc.     3. WOUND: It is not unusual for patients to experience swelling and even bruising at the hernia repair site.     4. ICE: please use ice on the wound to decrease the swelling for the first 24 hours and then discontinue.     5. BATHING: The dressing can be removed two days after surgery and you may then allow the wound to get wet in a shower as normal, but avoid submersion in water (tub bath) for at least a week. Please leave the steri-strips in place, they will fall off over 5-7 days.     6. PAIN MEDICATION: You will be given a prescription for pain medication at discharge. Please take these as directed. It is important to remember not to take medications on an empty stomach as this may cause nausea.     7. BOWEL FUNCTION: After hernia repair, it is not uncommon for patients to experience constipation. This is due to decreasing activity levels as well as pain medications. You may wish to use a stool softener beginning immediately after surgery, and you may or may not need to use a laxative (Miralax, Milk of Magnesia, Ex-lax; Senokot, etc.) as well.     8. CALL IF YOU HAVE: (1) Fevers to more than 101.00 F, (2) Unusual chest or leg pain, (3) Drainage or fluid from incision that may be foul smelling, increased tenderness or soreness at the wound or the wound edges are no longer together, redness or swelling at the incision site. Please do not hesitate to call with any  other questions.     9. APPOINTMENT: Contact our office at [phone] for a follow-up appointment in 1 to 2 weeks following your procedure.     If you have any additional questions, please do not hesitate to call the office and speak to either myself or the physician on call.     Inguinal Hernia, Adult  Muscles help keep everything in the body in its proper place. But if a weak spot in the muscles develops, something can poke through. That is called a hernia. When this happens in the lower part of the belly (abdomen), it is called an inguinal hernia. (It takes its name from a part of the body in this region called the inguinal canal.) A weak spot in the wall of muscles lets some fat or part of the small intestine bulge through. An inguinal hernia can develop at any age. Men get them more often than women.  CAUSES   In adults, an inguinal hernia develops over time.  · It can be triggered by:  ¨ Suddenly straining the muscles of the lower abdomen.  ¨ Lifting heavy objects.  ¨ Straining to have a bowel movement. Difficult bowel movements (constipation) can lead to this.  ¨ Constant coughing. This may be caused by smoking or lung disease.  ¨ Being overweight.  ¨ Being pregnant.  ¨ Working at a job that requires long periods of standing or heavy lifting.  ¨ Having had an inguinal hernia before.  One type can be an emergency situation. It is called a strangulated inguinal hernia. It develops if part of the small intestine slips through the weak spot and cannot get back into the abdomen. The blood supply can be cut off. If that happens, part of the intestine may die. This situation requires emergency surgery.  SYMPTOMS   Often, a small inguinal hernia has no symptoms. It is found when a healthcare provider does a physical exam. Larger hernias usually have symptoms.   · In adults, symptoms may include:  ¨ A lump in the groin. This is easier to see when the person is standing. It might disappear when lying down.  ¨ In men,  a lump in the scrotum.  ¨ Pain or burning in the groin. This occurs especially when lifting, straining or coughing.  ¨ A dull ache or feeling of pressure in the groin.  · Signs of a strangulated hernia can include:  ¨ A bulge in the groin that becomes very painful and tender to the touch.  ¨ A bulge that turns red or purple.  ¨ Fever, nausea and vomiting.  ¨ Inability to have a bowel movement or to pass gas.  DIAGNOSIS   To decide if you have an inguinal hernia, a healthcare provider will probably do a physical examination.  · This will include asking questions about any symptoms you have noticed.  · The healthcare provider might feel the groin area and ask you to cough. If an inguinal hernia is felt, the healthcare provider may try to slide it back into the abdomen.  · Usually no other tests are needed.  TREATMENT   Treatments can vary. The size of the hernia makes a difference. Options include:  · Watchful waiting. This is often suggested if the hernia is small and you have had no symptoms.  ¨ No medical procedure will be done unless symptoms develop.  ¨ You will need to watch closely for symptoms. If any occur, contact your healthcare provider right away.  · Surgery. This is used if the hernia is larger or you have symptoms.  ¨ Open surgery. This is usually an outpatient procedure (you will not stay overnight in a hospital). An cut (incision) is made through the skin in the groin. The hernia is put back inside the abdomen. The weak area in the muscles is then repaired by herniorrhaphy or hernioplasty. Herniorrhaphy: in this type of surgery, the weak muscles are sewn back together. Hernioplasty: a patch or mesh is used to close the weak area in the abdominal wall.  ¨ Laparoscopy. In this procedure, a surgeon makes small incisions. A thin tube with a tiny video camera (called a laparoscope) is put into the abdomen. The surgeon repairs the hernia with mesh by looking with the video camera and using two long  "instruments.  HOME CARE INSTRUCTIONS   · After surgery to repair an inguinal hernia:  ¨ You will need to take pain medicine prescribed by your healthcare provider. Follow all directions carefully.  ¨ You will need to take care of the wound from the incision.  ¨ Your activity will be restricted for awhile. This will probably include no heavy lifting for several weeks. You also should not do anything too active for a few weeks. When you can return to work will depend on the type of job that you have.  · During \"watchful waiting\" periods, you should:  ¨ Maintain a healthy weight.  ¨ Eat a diet high in fiber (fruits, vegetables and whole grains).  ¨ Drink plenty of fluids to avoid constipation. This means drinking enough water and other liquids to keep your urine clear or pale yellow.  ¨ Do not lift heavy objects.  ¨ Do not stand for long periods of time.  ¨ Quit smoking. This should keep you from developing a frequent cough.  SEEK MEDICAL CARE IF:   · A bulge develops in your groin area.  · You feel pain, a burning sensation or pressure in the groin. This might be worse if you are lifting or straining.  · You develop a fever of more than 100.5° F (38.1° C).  SEEK IMMEDIATE MEDICAL CARE IF:   · Pain in the groin increases suddenly.  · A bulge in the groin gets bigger suddenly and does not go down.  · For men, there is sudden pain in the scrotum. Or, the size of the scrotum increases.  · A bulge in the groin area becomes red or purple and is painful to touch.  · You have nausea or vomiting that does not go away.  · You feel your heart beating much faster than normal.  · You cannot have a bowel movement or pass gas.  · You develop a fever of more than 102.0° F (38.9° C).     This information is not intended to replace advice given to you by your health care provider. Make sure you discuss any questions you have with your health care provider.     Document Released: 05/06/2010 Document Revised: 03/11/2013 Document " Reviewed: 05/06/2010  [employer] Interactive Patient Education ©2016 [employer]  Hernia Repair  Care After  These instructions give you information on caring for yourself after your procedure. Your doctor may also give you more specific instructions. Call your doctor if you have any problems or questions after your procedure.  HOME CARE   · You may have changes in your poops (bowel movements).  · You may have loose or watery poop (diarrhea).  · You may be not able to poop.  · Your bowels will slowly get back to normal.  · Do not eat any food that makes you sick to your stomach (nauseous). Eat small meals 4 to 6 times a day instead of 3 large ones.  · Do not drink pop. It will give you gas.  · Do not drink alcohol.  · Do not lift anything heavier than 10 pounds. This is about the weight of a gallon of milk.  · Do not do anything that makes you very tired for at least 6 weeks.  · Do not get your wound wet for 2 days.  · You may take a sponge bath during this time.  · After 2 days you may take a shower. Gently pat your surgical cut (incision) dry with a towel. Do not rub it.  · For men: You may have been given an athletic supporter (scrotal support) before you left the hospital. It holds your scrotum and testicles closer to your body so there is no strain on your wound. Wear the supporter until your doctor tells you that you do not need it anymore.  GET HELP RIGHT AWAY IF:  · You have watery poop, or cannot poop for more than 3 days.  · You feel sick to your stomach or throw up (vomit) more than 2 or 3 times.  · You have temperature by mouth above 102° F (38.9° C).  · You see redness or puffiness (swelling) around your wound.  · You see yellowish white fluid (pus) coming from your wound.  · You see a bulge or bump in your lower belly (abdomen) or near your groin.  · You develop a rash, trouble breathing, or any other symptoms from medicines taken.  MAKE SURE YOU:  · Understand these instructions.  · Will watch your  condition.  · Will get help right away if your are not doing well or get worse.  Document Released: 11/30/2009 Document Revised: 03/11/2013 Document Reviewed: 11/30/2009  [employer]® Patient Information ©2014 [employer].      Discharged to home by car with relative. Discharged via wheelchair, hospital escort: Yes.  Special equipment needed: Not Applicable    Be sure to schedule a follow-up appointment with your primary care doctor or any specialists as instructed.     Discharge Plan:   Influenza Vaccine Indication: Patient Refuses    I understand that a diet low in cholesterol, fat, and sodium is recommended for good health. Unless I have been given specific instructions below for another diet, I accept this instruction as my diet prescription.   Other diet: Regular diet    Special Instructions: None    · Is patient discharged on Warfarin / Coumadin?   No     · Is patient Post Blood Transfusion?  No      Depression / Suicide Risk    As you are discharged from this [facility] facility, it is important to learn how to keep safe from harming yourself.    Recognize the warning signs:  · Abrupt changes in personality, positive or negative- including increase in energy   · Giving away possessions  · Change in eating patterns- significant weight changes-  positive or negative  · Change in sleeping patterns- unable to sleep or sleeping all the time   · Unwillingness or inability to communicate  · Depression  · Unusual sadness, discouragement and loneliness  · Talk of wanting to die  · Neglect of personal appearance   · Rebelliousness- reckless behavior  · Withdrawal from people/activities they love  · Confusion- inability to concentrate     If you or a loved one observes any of these behaviors or has concerns about self-harm, here's what you can do:  · Talk about it- your feelings and reasons for harming yourself  · Remove any means that you might use to hurt yourself (examples: pills, rope, extension cords,  firearm)  · Get professional help from the community (Mental Health, Substance Abuse, psychological counseling)  · Do not be alone:Call your Safe Contact- someone whom you trust who will be there for you.  · Call your local CRISIS HOTLINE 877-1050 or 673-926-5425  · Call your local Children's Mobile Crisis Response Team [place] (154) 344-9740 or www.MEPS Real-Time  · Call the toll free National Suicide Prevention Hotlines   · National Suicide Prevention Lifeline 613-853-WRNA (8488)  · National Hope Line Network 800-SUICIDE (471-2232)

## 2017-03-20 ENCOUNTER — OFFICE VISIT (OUTPATIENT)
Dept: HEMATOLOGY ONCOLOGY | Facility: MEDICAL CENTER | Age: 77
End: 2017-03-20
Payer: MEDICARE

## 2017-03-20 ENCOUNTER — HOSPITAL ENCOUNTER (OUTPATIENT)
Facility: MEDICAL CENTER | Age: 77
End: 2017-03-20
Attending: INTERNAL MEDICINE
Payer: MEDICARE

## 2017-03-20 ENCOUNTER — NON-PROVIDER VISIT (OUTPATIENT)
Dept: HEMATOLOGY ONCOLOGY | Facility: MEDICAL CENTER | Age: 77
End: 2017-03-20
Payer: MEDICARE

## 2017-03-20 VITALS
OXYGEN SATURATION: 95 % | SYSTOLIC BLOOD PRESSURE: 118 MMHG | HEIGHT: 65 IN | DIASTOLIC BLOOD PRESSURE: 74 MMHG | TEMPERATURE: 98.6 F | HEART RATE: 75 BPM | RESPIRATION RATE: 16 BRPM | BODY MASS INDEX: 28.16 KG/M2 | WEIGHT: 169 LBS

## 2017-03-20 VITALS
SYSTOLIC BLOOD PRESSURE: 118 MMHG | WEIGHT: 169.8 LBS | OXYGEN SATURATION: 95 % | HEIGHT: 65 IN | TEMPERATURE: 98.6 F | BODY MASS INDEX: 28.29 KG/M2 | HEART RATE: 75 BPM | RESPIRATION RATE: 16 BRPM | DIASTOLIC BLOOD PRESSURE: 74 MMHG

## 2017-03-20 DIAGNOSIS — C91.10 CHRONIC LYMPHOCYTIC LEUKEMIA OF B-CELL TYPE NOT HAVING ACHIEVED REMISSION (HCC): ICD-10-CM

## 2017-03-20 LAB — LDH SERPL L TO P-CCNC: 193 U/L (ref 107–266)

## 2017-03-20 PROCEDURE — 88185 FLOWCYTOMETRY/TC ADD-ON: CPT | Mod: 91

## 2017-03-20 PROCEDURE — G8484 FLU IMMUNIZE NO ADMIN: HCPCS | Performed by: INTERNAL MEDICINE

## 2017-03-20 PROCEDURE — 99213 OFFICE O/P EST LOW 20 MIN: CPT | Performed by: INTERNAL MEDICINE

## 2017-03-20 PROCEDURE — 88275 CYTOGENETICS 100-300: CPT | Mod: GA

## 2017-03-20 PROCEDURE — 88184 FLOWCYTOMETRY/ TC 1 MARKER: CPT

## 2017-03-20 PROCEDURE — 3017F COLORECTAL CA SCREEN DOC REV: CPT | Mod: 8P | Performed by: INTERNAL MEDICINE

## 2017-03-20 PROCEDURE — 36415 COLL VENOUS BLD VENIPUNCTURE: CPT | Performed by: INTERNAL MEDICINE

## 2017-03-20 PROCEDURE — 88271 CYTOGENETICS DNA PROBE: CPT | Mod: GA

## 2017-03-20 PROCEDURE — G8432 DEP SCR NOT DOC, RNG: HCPCS | Performed by: INTERNAL MEDICINE

## 2017-03-20 PROCEDURE — 88291 CYTO/MOLECULAR REPORT: CPT | Mod: GA

## 2017-03-20 PROCEDURE — G8420 CALC BMI NORM PARAMETERS: HCPCS | Performed by: INTERNAL MEDICINE

## 2017-03-20 PROCEDURE — 4040F PNEUMOC VAC/ADMIN/RCVD: CPT | Mod: 8P | Performed by: INTERNAL MEDICINE

## 2017-03-20 PROCEDURE — 1101F PT FALLS ASSESS-DOCD LE1/YR: CPT | Mod: 8P | Performed by: INTERNAL MEDICINE

## 2017-03-20 PROCEDURE — 82232 ASSAY OF BETA-2 PROTEIN: CPT

## 2017-03-20 PROCEDURE — 83615 LACTATE (LD) (LDH) ENZYME: CPT

## 2017-03-20 PROCEDURE — 1036F TOBACCO NON-USER: CPT | Performed by: INTERNAL MEDICINE

## 2017-03-20 ASSESSMENT — PAIN SCALES - GENERAL
PAINLEVEL: NO PAIN
PAINLEVEL: NO PAIN

## 2017-03-20 NOTE — MR AVS SNAPSHOT
Wally Garcia Marycruz   3/20/2017 2:45 PM   Appointment   MRN: [id number]    Department:  [facility]   Dept Phone:  [phone]    Description:  Male : 1940   Provider:  [facility] MA 1           Allergies as of 3/20/2017     Allergen Noted Reactions    Ciprofloxacin 2014       Lethargic.skin turned yellow, could not pee      Vital Signs     Smoking Status                   Never Smoker            Basic Information     Date Of Birth Sex Race Ethnicity Preferred Language    1940 Male White Non- English      Your appointments     Mar 20, 2018  1:00 PM   ONCOLOGY EST PATIENT 30 MIN with Jose R Wright M.D.   [facility] (--)    [address], [address]  [facility] [address] [phone]              Problem List              ICD-10-CM Priority Class Noted - Resolved    Chronic lymphocytic leukemia (CLL), B-cell (CMS-HCC) C91.10   2014 - Present    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      Below and/or attached are the medications your provider expects you to take. Review all of your home medications and newly ordered medications with your provider and/or pharmacist. Follow medication instructions as directed by your provider and/or pharmacist. Please keep your medication list with you and share with your provider. Update the information when medications are discontinued, doses are changed, or new medications (including  over-the-counter products) are added; and carry medication information at all times in the event of emergency situations     Allergies:  CIPROFLOXACIN - (reactions not documented)               Medications  Valid as of: March 20, 2017 -  2:51 PM    Generic Name Brand Name Tablet Size Instructions for use    Ascorbic Acid   Take  by mouth as needed.        Cyanocobalamin   Take  by mouth as needed.        Hydrocodone-Acetaminophen (Tab) NORCO 5-325 MG Take 1-2 Tabs by mouth every four hours as needed (pain).        Ibuprofen   Take 1 tablet by mouth as needed.        .                 Medicines prescribed today were sent to:     [facility][id number] - [address] [address]    Phone: [phone] Fax: [phone]    Open 24 Hours?: No      Medication refill instructions:       If your prescription bottle indicates you have medication refills left, it is not necessary to call your provider’s office. Please contact your pharmacy and they will refill your medication.    If your prescription bottle indicates you do not have any refills left, you may request refills at any time through one of the following ways: The online [employer] system (except Urgent Care), by calling your provider’s office, or by asking your pharmacy to contact your provider’s office with a refill request. Medication refills are processed only during regular business hours and may not be available until the next business day. Your provider may request additional information or to have a follow-up visit with you prior to refilling your medication.   *Please Note: Medication refills are assigned a new Rx number when refilled electronically. Your pharmacy may indicate that no refills were authorized even though a new prescription for the same medication is available at the pharmacy. Please request the medicine by name with the pharmacy before contacting your provider for a refill.            MyChart Status: Patient Declined

## 2017-03-20 NOTE — MR AVS SNAPSHOT
"        Wally Garcia Marycruz   3/20/2017 2:00 PM   Office Visit   MRN: [id number]    Department:  [facility]   Dept Phone:  [phone]    Description:  Male : 1940   Provider:  Jose R Wright M.D.           Reason for Visit     New Patient History of prostate cancer. Ref: Kory Suero      Allergies as of 3/20/2017     Allergen Noted Reactions    Ciprofloxacin 2014       Lethargic.skin turned yellow, could not pee      You were diagnosed with     Chronic lymphocytic leukemia of B-cell type not having achieved remission (CMS-HCC)   [125241]         Vital Signs     Blood Pressure Pulse Temperature Respirations Height Weight    118/74 mmHg 75 37 °C (98.6 °F) 16 1.651 m (5' 5\") 77.021 kg (169 lb 12.8 oz)    Body Mass Index Oxygen Saturation Smoking Status             28.26 kg/m2 95% Never Smoker          Basic Information     Date Of Birth Sex Race Ethnicity Preferred Language    1940 Male White Non- English      Problem List              ICD-10-CM Priority Class Noted - Resolved    Chronic lymphocytic leukemia (CLL), B-cell (CMS-HCC) C91.10   2014 - Present    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      Below and/or attached are the medications your provider expects you to take. Review all of your home medications and newly ordered medications with your provider and/or pharmacist. Follow medication instructions as directed by your provider " and/or pharmacist. Please keep your medication list with you and share with your provider. Update the information when medications are discontinued, doses are changed, or new medications (including over-the-counter products) are added; and carry medication information at all times in the event of emergency situations     Allergies:  CIPROFLOXACIN - (reactions not documented)               Medications  Valid as of: March 20, 2017 -  2:35 PM    Generic Name Brand Name Tablet Size Instructions for use    Ascorbic Acid   Take  by mouth as needed.        Cyanocobalamin   Take  by mouth as needed.        Hydrocodone-Acetaminophen (Tab) NORCO 5-325 MG Take 1-2 Tabs by mouth every four hours as needed (pain).        Ibuprofen   Take 1 tablet by mouth as needed.        .                 Medicines prescribed today were sent to:     [facility][id number] - [address] [address]    Phone: [phone] Fax: [phone]    Open 24 Hours?: No      Medication refill instructions:       If your prescription bottle indicates you have medication refills left, it is not necessary to call your provider’s office. Please contact your pharmacy and they will refill your medication.    If your prescription bottle indicates you do not have any refills left, you may request refills at any time through one of the following ways: The online Telestream system (except Urgent Care), by calling your provider’s office, or by asking your pharmacy to contact your provider’s office with a refill request. Medication refills are processed only during regular business hours and may not be available until the next business day. Your provider may request additional information or to have a follow-up visit with you prior to refilling your medication.   *Please Note: Medication refills are assigned a new Rx number when refilled electronically. Your pharmacy may indicate that no refills were  authorized even though a new prescription for the same medication is available at the pharmacy. Please request the medicine by name with the pharmacy before contacting your provider for a refill.        Your To Do List     Future Labs/Procedures Complete By Expires    BETA-2-MICROGLOBULIN  As directed 3/20/2018    FISH, ONCOLOGY  As directed 3/20/2018    Comments:    CLL FISH panel    LDH  As directed 3/20/2018    LEUK/LYMPH PHENOTYPING, FLOW CYTOMETRY  As directed 3/20/2018         MyChart Status: Patient Declined

## 2017-03-20 NOTE — PROGRESS NOTES
Consult Note: Hematology    Date of consultation: 3/20/2017 4:58 PM    Referring provider: Kory Suero M.D.    Reason for consultation: CLL      History of presenting illness:      Wally Joel is a 76 y.o. year old male who saw Dr. 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 He was lost to follow-up on wanted to establish care. He also has remote history of prostate carcinoma. He is currently asymptomatic and denies having any B symptoms, lymphadenopathy, or active problems.    Past Medical History:    Past Medical History   Diagnosis Date   • Urinary bladder disorder    • CLL (chronic lymphocytic leukemia) (CMS-HCC) 7/2014   • Cancer (CMS-[facility])      prostate, CLL       Past surgical history:    Past Surgical History   Procedure Laterality Date   • Other abdominal surgery  2010     ventral hernia   • Cystoscopy  10/29/2014     Performed by Rui Tang M.D. at SURGERY [facility] [place]   • Trans urethral resection prostate  10/29/2014     Performed by Rui Tang M.D. at SURGERY [facility] [place]   • Other orthopedic surgery  1988, 1989     knee scope x 2   • Inguinal hernia laparoscopic  3/1/2017     Procedure: INGUINAL HERNIA LAPAROSCOPIC RT AND INCISIONAL WITH MESH;  Surgeon: Kory Suero M.D.;  Location: SURGERY [facility] [place];  Service:        Allergies:  Ciprofloxacin    Medications:    Current Outpatient Prescriptions   Medication Sig Dispense Refill   • hydrocodone-acetaminophen (NORCO) 5-325 MG Tab per tablet Take 1-2 Tabs by mouth every four hours as needed (pain). 40 Tab 0   • Ibuprofen (ADVIL PO) Take 1 tablet by mouth as needed.     • Cyanocobalamin (VITAMIN B 12 PO) Take  by mouth as needed.     • Ascorbic Acid (VITAMIN C PO) Take  by mouth as needed.       No current facility-administered medications for this visit.       Social History:     Social History     Social History   • Marital Status:      Spouse Name: N/A   • Number of Children: N/A   • Years of  "Education: N/A     Occupational History   • Not on file.     Social History Main Topics   • Smoking status: Never Smoker    • Smokeless tobacco: Never Used   • Alcohol Use: No   • Drug Use: No   • Sexual Activity: Not on file     Other Topics Concern   • Not on file     Social History Narrative       Family History:   No family history on file.    Review of Systems:  All other review of systems are negative except what was mentioned above in the HPI.    Constitutional: No fever, chills, weight loss ,malaise/fatigue.    HEENT: No new auditory or visual complaints. No sore throat and neck pain.     Respiratory:No new cough, sputum production, shortness of breath and wheezing.    Cardiovascular: No new chest pain, palpitations, orthopnea and leg swelling.    Gastrointestinal: No heartburn, nausea, vomiting ,abdominal pain, hematochezia or melena     Genitourinary: Negative for dysuria, hematuria    Musculoskeletal: No new arthralgias or myalgias   Skin: Negative for rash and itching.    Neurological: Negative for focal weakness or headaches.    Endo/Heme/Allergies: No abnormal bleed/bruise.    Psychiatric/Behavioral: No new depression/anxiety.    Physical Exam:  Vitals:   /74 mmHg  Pulse 75  Temp(Src) 37 °C (98.6 °F)  Resp 16  Ht 1.651 m (5' 5\")  Wt 77.021 kg (169 lb 12.8 oz)  BMI 28.26 kg/m2  SpO2 95%    General: Not in acute distress, alert and oriented x 3  HEENT: No pallor, icterus. Oropharynx clear.   Neck: Supple, no palpable masses.  Lymph nodes: No palpable cervical, supraclavicular, axillary or inguinal lymphadenopathy.    CVS: regular rate and rhythm, no rubs or gallops  RESP: Clear to auscultate bilaterally, no wheezing or crackles.   ABD: Soft, non tender, non distended, positive bowel sounds, no palpable organomegaly  EXT: No edema or cyanosis  CNS: Alert and oriented x3, No focal deficits.  Skin- No rash      Labs:   No results for input(s): RBC, HEMOGLOBIN, HEMATOCRIT, PLATELETCT, " PROTHROMBTM, APTT, INR, IRON, FERRITIN, TOTIRONBC in the last 72 hours.  Lab Results   Component Value Date/Time    SODIUM 135 03/02/2017 04:00 AM    POTASSIUM 4.6 03/02/2017 04:00 AM    CHLORIDE 104 03/02/2017 04:00 AM    CO2 24 03/02/2017 04:00 AM    GLUCOSE 211* 03/02/2017 04:00 AM    BUN 13 03/02/2017 04:00 AM    CREATININE 1.10 03/02/2017 04:00 AM        Assessment and Plan:   stage 0 CLL-he does not have any anemia, thrombocytopenia. His WBC has gradually trended up. Does not have any lymphadenopathy or B symptoms. He had low CD38 expression indicating favorable prognosis. He has mild chronic thrombocytopenia, which has not really worsened. It is unclear whether this is related to CLL or not. I will obtain a baseline fish panel, LDH. I reassured him that he does not need any intervention and he will return to clinic in one year. He follows up with Dr. Tang for his prostate carcinoma.    He agreed and verbalized his agreement and understanding with the current plan.  I answered all questions and concerns he has at this time.              Thank you for allowing me to participate in his care.    Please note that this dictation was created using voice recognition software. I have made every reasonable attempt to correct obvious errors, but I expect that there are errors of grammar and possibly content that I did not discover before finalizing the note.      SIGNATURES:  Jose R Wright    CC:  Pcp Pt States None  Kory Suero M.D.

## 2017-03-22 LAB
ACUTE LEUKEMIA MARKERS SPEC-IMP: NORMAL
B2 MICROGLOB SERPL-MCNC: 2.4 MG/L (ref 1.1–2.4)
CD10 CELLS NFR SPEC: 1 %
CD13 CELLS NFR SPEC: 1 %
CD16 CELLS NFR SPEC: 2 %
CD19 CELLS NFR SPEC: 93 %
CD2 CELLS NFR SPEC: 6 %
CD20 CELLS NFR SPEC: 91 %
CD200 LEUKLYMP PHENO Q5833: 92 %
CD23 CELLS NFR SPEC: 90 %
CD3 CELLS NFR SPEC: 5 %
CD34 CELLS NFR SPEC: <1 %
CD38 CELLS NFR SPEC: 10 %
CD4 LEUKLYMP PHENO Q5783: 4 %
CD45 CELLS NFR SPEC: NORMAL %
CD5 CELLS NFR SPEC: 98 %
CD7 CELLS NFR SPEC: 5 %
CD8 LEUKLYMP PHENO Q5786: 1 %
EVENTS COUNTED SPEC: 18 MARKERS
LYMPHS KAPPA/LYMPH NFR SPEC: 1 %
LYMPHS LAMBDA/LYMPH NFR SPEC: 92 %

## 2017-03-29 ENCOUNTER — TELEPHONE (OUTPATIENT)
Dept: HEMATOLOGY ONCOLOGY | Facility: MEDICAL CENTER | Age: 77
End: 2017-03-29

## 2017-03-29 NOTE — TELEPHONE ENCOUNTER
RD attempted to call patient via telephone to set up nutrition consult.  Patient is currently unavailable.  RD left voicemail with contact information.  Please have patient call dietitian at [phone].

## 2017-04-04 LAB — TEST NAME 95000: NORMAL

## 2017-04-27 ENCOUNTER — DOCUMENTATION (OUTPATIENT)
Dept: HEMATOLOGY ONCOLOGY | Facility: MEDICAL CENTER | Age: 77
End: 2017-04-27

## 2017-04-27 NOTE — PROGRESS NOTES
Referral from [facility], Dr. Wright.  Chart review completed and per review patient diagnosed with stage 0 CLL, not in active treatment at this time.  Oncology Nurse Navigation not indicated at this time.

## 2018-03-15 ENCOUNTER — APPOINTMENT (OUTPATIENT)
Dept: ADMISSIONS | Facility: MEDICAL CENTER | Age: 78
End: 2018-03-15
Attending: UROLOGY
Payer: MEDICARE

## 2018-03-16 ENCOUNTER — HOSPITAL ENCOUNTER (OUTPATIENT)
Facility: MEDICAL CENTER | Age: 78
End: 2018-03-16
Attending: UROLOGY | Admitting: UROLOGY
Payer: MEDICARE

## 2018-03-16 VITALS
WEIGHT: 169.53 LBS | DIASTOLIC BLOOD PRESSURE: 65 MMHG | TEMPERATURE: 98.3 F | OXYGEN SATURATION: 97 % | HEART RATE: 61 BPM | BODY MASS INDEX: 30.04 KG/M2 | RESPIRATION RATE: 16 BRPM | SYSTOLIC BLOOD PRESSURE: 106 MMHG | HEIGHT: 63 IN

## 2018-03-16 LAB
APTT PPP: 28.6 SEC (ref 24.7–36)
INR PPP: 1.19 (ref 0.87–1.13)
PROTHROMBIN TIME: 14.8 SEC (ref 12–14.6)

## 2018-03-16 PROCEDURE — 85610 PROTHROMBIN TIME: CPT

## 2018-03-16 PROCEDURE — 88300 SURGICAL PATH GROSS: CPT

## 2018-03-16 PROCEDURE — 87186 SC STD MICRODIL/AGAR DIL: CPT

## 2018-03-16 PROCEDURE — 160035 HCHG PACU - 1ST 60 MINS PHASE I: Performed by: UROLOGY

## 2018-03-16 PROCEDURE — 160025 RECOVERY II MINUTES (STATS): Performed by: UROLOGY

## 2018-03-16 PROCEDURE — 500879 HCHG PACK, CYSTO: Performed by: UROLOGY

## 2018-03-16 PROCEDURE — A9270 NON-COVERED ITEM OR SERVICE: HCPCS

## 2018-03-16 PROCEDURE — 501329 HCHG SET, CYSTO IRRIG Y TUR: Performed by: UROLOGY

## 2018-03-16 PROCEDURE — 160046 HCHG PACU - 1ST 60 MINS PHASE II: Performed by: UROLOGY

## 2018-03-16 PROCEDURE — A4338 INDWELLING CATHETER LATEX: HCPCS | Performed by: UROLOGY

## 2018-03-16 PROCEDURE — 85730 THROMBOPLASTIN TIME PARTIAL: CPT

## 2018-03-16 PROCEDURE — A4357 BEDSIDE DRAINAGE BAG: HCPCS | Performed by: UROLOGY

## 2018-03-16 PROCEDURE — 700111 HCHG RX REV CODE 636 W/ 250 OVERRIDE (IP)

## 2018-03-16 PROCEDURE — 87086 URINE CULTURE/COLONY COUNT: CPT

## 2018-03-16 PROCEDURE — 160028 HCHG SURGERY MINUTES - 1ST 30 MINS LEVEL 3: Performed by: UROLOGY

## 2018-03-16 PROCEDURE — 160048 HCHG OR STATISTICAL LEVEL 1-5: Performed by: UROLOGY

## 2018-03-16 PROCEDURE — 82365 CALCULUS SPECTROSCOPY: CPT

## 2018-03-16 PROCEDURE — 160009 HCHG ANES TIME/MIN: Performed by: UROLOGY

## 2018-03-16 PROCEDURE — 700102 HCHG RX REV CODE 250 W/ 637 OVERRIDE(OP)

## 2018-03-16 PROCEDURE — 160002 HCHG RECOVERY MINUTES (STAT): Performed by: UROLOGY

## 2018-03-16 PROCEDURE — 87077 CULTURE AEROBIC IDENTIFY: CPT

## 2018-03-16 PROCEDURE — 160036 HCHG PACU - EA ADDL 30 MINS PHASE I: Performed by: UROLOGY

## 2018-03-16 PROCEDURE — 700101 HCHG RX REV CODE 250

## 2018-03-16 PROCEDURE — 160039 HCHG SURGERY MINUTES - EA ADDL 1 MIN LEVEL 3: Performed by: UROLOGY

## 2018-03-16 RX ORDER — SULFAMETHOXAZOLE AND TRIMETHOPRIM 800; 160 MG/1; MG/1
1 TABLET ORAL 2 TIMES DAILY
Qty: 10 TAB | Refills: 0 | Status: SHIPPED | OUTPATIENT
Start: 2018-03-16 | End: 2018-03-21

## 2018-03-16 RX ORDER — ONDANSETRON 2 MG/ML
4 INJECTION INTRAMUSCULAR; INTRAVENOUS EVERY 4 HOURS PRN
Status: DISCONTINUED | OUTPATIENT
Start: 2018-03-16 | End: 2018-03-16 | Stop reason: HOSPADM

## 2018-03-16 RX ORDER — OXYCODONE HCL 5 MG/5 ML
SOLUTION, ORAL ORAL
Status: COMPLETED
Start: 2018-03-16 | End: 2018-03-16

## 2018-03-16 RX ORDER — BUPIVACAINE HYDROCHLORIDE 2.5 MG/ML
0.5 INJECTION, SOLUTION EPIDURAL; INFILTRATION; INTRACAUDAL
Status: DISCONTINUED | OUTPATIENT
Start: 2018-03-16 | End: 2018-03-16 | Stop reason: HOSPADM

## 2018-03-16 RX ORDER — SODIUM CHLORIDE, SODIUM LACTATE, POTASSIUM CHLORIDE, CALCIUM CHLORIDE 600; 310; 30; 20 MG/100ML; MG/100ML; MG/100ML; MG/100ML
INJECTION, SOLUTION INTRAVENOUS CONTINUOUS
Status: DISCONTINUED | OUTPATIENT
Start: 2018-03-16 | End: 2018-03-16 | Stop reason: HOSPADM

## 2018-03-16 RX ORDER — LIDOCAINE AND PRILOCAINE 25; 25 MG/G; MG/G
1 CREAM TOPICAL
Status: DISCONTINUED | OUTPATIENT
Start: 2018-03-16 | End: 2018-03-16 | Stop reason: HOSPADM

## 2018-03-16 RX ORDER — KETOROLAC TROMETHAMINE 30 MG/ML
INJECTION, SOLUTION INTRAMUSCULAR; INTRAVENOUS
Status: COMPLETED
Start: 2018-03-16 | End: 2018-03-16

## 2018-03-16 RX ORDER — IBUPROFEN 200 MG
200 TABLET ORAL
Status: ON HOLD | COMMUNITY
End: 2018-03-16

## 2018-03-16 RX ADMIN — KETOROLAC TROMETHAMINE 15 MG: 30 INJECTION, SOLUTION INTRAMUSCULAR at 16:30

## 2018-03-16 RX ADMIN — SODIUM CHLORIDE, SODIUM LACTATE, POTASSIUM CHLORIDE, CALCIUM CHLORIDE: 600; 310; 30; 20 INJECTION, SOLUTION INTRAVENOUS at 14:45

## 2018-03-16 RX ADMIN — OXYCODONE HYDROCHLORIDE 5 MG: 5 SOLUTION ORAL at 16:15

## 2018-03-16 ASSESSMENT — PAIN SCALES - GENERAL
PAINLEVEL_OUTOF10: 6
PAINLEVEL_OUTOF10: 1
PAINLEVEL_OUTOF10: 3
PAINLEVEL_OUTOF10: 3
PAINLEVEL_OUTOF10: 0
PAINLEVEL_OUTOF10: 5
PAINLEVEL_OUTOF10: 6
PAINLEVEL_OUTOF10: 6

## 2018-03-16 NOTE — OR SURGEON
Immediate Post OP Note    PreOp Diagnosis: prostatic urethral stone    PostOp Diagnosis:   1. Prostatic urethral stone  2. Bladder stones    Procedure(s):  CYSTOSCOPY- LITHOPAXY - Wound Class: Clean Contaminated    Surgeon(s):  Rui Tang M.D.    Anesthesiologist/Type of Anesthesia:  Anesthesiologist: Raza Kaplan M.D./General    Surgical Staff:  Circulator: Rui Beard R.N.  Scrub Person: Jeremy Gleason    Specimens:  * No specimens in log *    Estimated Blood Loss: minimal    Findings: stone impacted in prostatic urethra, and bladder stones as well    Complications: none        3/16/2018 4:30 PM Rui Tang

## 2018-03-17 NOTE — DISCHARGE INSTRUCTIONS
ACTIVITY: Rest and take it easy for the first 24 hours.  A responsible adult is recommended to remain with you during that time.  It is normal to feel sleepy.  We encourage you to not do anything that requires balance, judgment or coordination.    MILD FLU-LIKE SYMPTOMS ARE NORMAL. YOU MAY EXPERIENCE GENERALIZED MUSCLE ACHES, THROAT IRRITATION, HEADACHE AND/OR SOME NAUSEA.    FOR 24 HOURS DO NOT:  Drive, operate machinery or run household appliances.  Drink beer or alcoholic beverages.   Make important decisions or sign legal documents.    SPECIAL INSTRUCTIONS:     Cystoscopy, Care After  Refer to this sheet in the next few weeks. These instructions provide you with information on caring for yourself after your procedure. Your caregiver may also give you more specific instructions. Your treatment has been planned according to current medical practices, but problems sometimes occur. Call your caregiver if you have any problems or questions after your procedure.  HOME CARE INSTRUCTIONS   Things you can do to ease any discomfort after your procedure include:  · Drinking enough water and fluids to keep your urine clear or pale yellow.  · Taking a warm bath to relieve any burning feelings.  SEEK IMMEDIATE MEDICAL CARE IF:   · You have an increase in blood in your urine.  · You notice blood clots in your urine.  · You have difficulty passing urine.  · You have the chills.  · You have abdominal pain.  · You have a fever or persistent symptoms for more than 2-3 days.  · You have a fever and your symptoms suddenly get worse.  MAKE SURE YOU:   · Understand these instructions.  · Will watch your condition.  · Will get help right away if you are not doing well or get worse.     This information is not intended to replace advice given to you by your health care provider. Make sure you discuss any questions you have with your health care provider.    DIET: To avoid nausea, slowly advance diet as tolerated, avoiding spicy or  greasy foods for the first day.  Add more substantial food to your diet according to your physician's instructions.  Babies can be fed formula or breast milk as soon as they are hungry.  INCREASE FLUIDS AND FIBER TO AVOID CONSTIPATION.    SURGICAL DRESSING/BATHING: Follow all instructions given to you by your surgeon.    FOLLOW-UP APPOINTMENT:  A follow-up appointment should be arranged with your doctor; call to schedule.    You should CALL YOUR PHYSICIAN if you develop:  Fever greater than 101 degrees F.  Pain not relieved by medication, or persistent nausea or vomiting.  Excessive bleeding (blood soaking through dressing) or unexpected drainage from the wound.  Extreme redness or swelling around the incision site, drainage of pus or foul smelling drainage.  Inability to urinate or empty your bladder within 8 hours.  Problems with breathing or chest pain.    You should call 911 if you develop problems with breathing or chest pain.  If you are unable to contact your doctor or surgical center, you should go to the nearest emergency room or urgent care center.  Physician's telephone #: Dr. Tang [phone]    If any questions arise, call your doctor.  If your doctor is not available, please feel free to call the Surgical Center at [phone].  The Center is open Monday through Friday from 7AM to 7PM.  You can also call the [employer] HOTLINE open 24 hours/day, 7 days/week and speak to a nurse at [phone], or toll free at [phone].    A registered nurse may call you a few days after your surgery to see how you are doing after your procedure.    MEDICATIONS: Resume taking daily medication.  Take prescribed pain medication with food.  If no medication is prescribed, you may take non-aspirin pain medication if needed.  PAIN MEDICATION CAN BE VERY CONSTIPATING.  Take a stool softener or laxative such as senokot, pericolace, or milk of magnesia if needed.    Prescription given for Bactrim.  Last pain  medication given at 4:15 pm.    If your physician has prescribed pain medication that includes Acetaminophen (Tylenol), do not take additional Acetaminophen (Tylenol) while taking the prescribed medication.    Depression / Suicide Risk    As you are discharged from this [facility] facility, it is important to learn how to keep safe from harming yourself.    Recognize the warning signs:  · Abrupt changes in personality, positive or negative- including increase in energy   · Giving away possessions  · Change in eating patterns- significant weight changes-  positive or negative  · Change in sleeping patterns- unable to sleep or sleeping all the time   · Unwillingness or inability to communicate  · Depression  · Unusual sadness, discouragement and loneliness  · Talk of wanting to die  · Neglect of personal appearance   · Rebelliousness- reckless behavior  · Withdrawal from people/activities they love  · Confusion- inability to concentrate     If you or a loved one observes any of these behaviors or has concerns about self-harm, here's what you can do:  · Talk about it- your feelings and reasons for harming yourself  · Remove any means that you might use to hurt yourself (examples: pills, rope, extension cords, firearm)  · Get professional help from the community (Mental Health, Substance Abuse, psychological counseling)  · Do not be alone:Call your Safe Contact- someone whom you trust who will be there for you.  · Call your local CRISIS HOTLINE 916-6538 or 081-851-3913  · Call your local Children's Mobile Crisis Response Team [place] (908) 604-1248 or www.[employer]  · Call the toll free National Suicide Prevention Hotlines   · National Suicide Prevention Lifeline 625-200-ZNVH (5822)  · National Hope Line Network 800-SUICIDE (864-5468)

## 2018-03-17 NOTE — OP REPORT
DATE OF SERVICE:  03/16/2018    PREOPERATIVE DIAGNOSES:  1.  Prostatic urethral stone.  2.  Benign prostatic hypertrophy.    POSTOPERATIVE DIAGNOSES:  1.  Urethral stone.  2.  Bladder calculi.  3.  Benign prostatic hypertrophy.  4.  Trabeculated bladder.    PROCEDURE:  1.  Cystolitholapaxy of both urethral and bladder stones.  2.  Cauterization of prostatic urethra.    SURGEON:  Rui Tang MD    ASSISTANT:  None.    ANESTHESIOLOGIST:  Raza Kaplan MD    TYPE OF ANESTHESIA:  General.    INDICATIONS:  This 77-year-old male with BPH and prostate cancer, had   developed gross hematuria.  Cystoscopy in the office revealed a bladder   calculus, which was manipulated back in the bladder.  He is taken now for   cystolitholapaxy.    FINDINGS:  There was a stone impacted in the prostatic urethra. Additionally,   there were bladder stones.  The prostate was markedly enlarged.  It was   hemorrhagic after dislodging the impacted stone in the urethra.  Bladder   stones were fragmented as well as a prostatic urethral stone.  Fragments were   excised and submitted for analysis.  Bleeding prostatic urethra was   cauterized.  The bladder was noted to be heavily trabeculated.    DESCRIPTION OF PROCEDURE:  The patient was identified in the holding area.  He   was taken to the operative suite.  General anesthesia was administered by Dr. Kaplan.  He was positioned in the dorsal lithotomy position.  Cefazolin was   given intravenously.  He was sterilely prepped and draped, time-out was   called.  Correct patient and site of surgery was confirmed.    A 25-Uzbek cystourethroscope with visual obturator was advanced.  There was   no penile urethral stricturing.  Within the prostatic urethra just proximal to   the verumontanum, a stone was impacted in the prostatic urethra.  Using a   1000-micron holmium laser fiber and energy settings of 0.6 joules and 8 Hz,   the stone was quickly fragmented and fragments retrograde manipulated into  the   bladder.  Two additional stones were noted in the bladder.  These were   similarly fragmented with the holmium laser.  Fragments were then washed out   of the bladder through the cystoscope sheath and collected and submitted as   urethral bladder calculi and mass.  The bladder was inspected.  No foreign   bodies, tumors or stones remained.  At that point, the prostatic urethra was   bleeding from where the stone had been impacted.  This was then cauterized   with the Bugbee electrode.  An 18-Kinyarwanda catheter was placed to gravity   drainage and placed on traction at the end of the procedure.  Patient was sent   in stable condition to the recovery room.  He suffered no intraoperative   complications.       ____________________________________     MD ADOLFO FARRELL / CECI    DD:  03/16/2018 16:34:46  DT:  03/16/2018 17:04:44    D#:  [id number]  Job#:  [id number]

## 2018-03-17 NOTE — OR NURSING
Discharge instructions reviewed. IV removed. All questions answered. All belongings returned to patient. VSS and denies pain.

## 2018-03-17 NOTE — OR NURSING
Flushed roman several times, some clots at the beginning, then turned to clear starla color. Roman dc'd after instilling 150 cc NS. Pt is AxOx4, taking po fluids, VSS. On room air. Reports pain at 1/10.

## 2018-03-19 ENCOUNTER — TELEPHONE (OUTPATIENT)
Dept: HEMATOLOGY ONCOLOGY | Facility: MEDICAL CENTER | Age: 78
End: 2018-03-19

## 2018-03-19 NOTE — TELEPHONE ENCOUNTER
"Patient called to cancel his one year follow up [facility] Dr. cedillo scheduled on 3/20/2018. He states his \"blood panel is back to normal.\"   "

## 2018-03-20 ENCOUNTER — APPOINTMENT (OUTPATIENT)
Dept: HEMATOLOGY ONCOLOGY | Facility: MEDICAL CENTER | Age: 78
End: 2018-03-20
Payer: MEDICARE

## 2018-03-20 LAB
BACTERIA UR CULT: ABNORMAL
BACTERIA UR CULT: ABNORMAL
SIGNIFICANT IND 70042: ABNORMAL
SITE SITE: ABNORMAL
SOURCE SOURCE: ABNORMAL

## 2018-03-22 LAB
APPEARANCE STONE: NORMAL
COMPN STONE: NORMAL
NUMBER STONE: NORMAL
SIZE STONE: NORMAL MM
SPECIMEN WT: 514 MG

## 2020-11-20 ENCOUNTER — ANESTHESIA (OUTPATIENT)
Dept: SURGERY | Facility: MEDICAL CENTER | Age: 80
DRG: 354 | End: 2020-11-20
Payer: MEDICARE

## 2020-11-20 ENCOUNTER — ANESTHESIA EVENT (OUTPATIENT)
Dept: SURGERY | Facility: MEDICAL CENTER | Age: 80
DRG: 354 | End: 2020-11-20
Payer: MEDICARE

## 2020-11-20 ENCOUNTER — APPOINTMENT (OUTPATIENT)
Dept: RADIOLOGY | Facility: MEDICAL CENTER | Age: 80
DRG: 354 | End: 2020-11-20
Attending: EMERGENCY MEDICINE
Payer: MEDICARE

## 2020-11-20 ENCOUNTER — HOSPITAL ENCOUNTER (INPATIENT)
Facility: MEDICAL CENTER | Age: 80
LOS: 5 days | DRG: 354 | End: 2020-11-25
Attending: EMERGENCY MEDICINE | Admitting: STUDENT IN AN ORGANIZED HEALTH CARE EDUCATION/TRAINING PROGRAM
Payer: MEDICARE

## 2020-11-20 DIAGNOSIS — R78.81 BACTEREMIA DUE TO STAPHYLOCOCCUS: ICD-10-CM

## 2020-11-20 DIAGNOSIS — B95.8 BACTEREMIA DUE TO STAPHYLOCOCCUS: ICD-10-CM

## 2020-11-20 PROBLEM — N39.0 UTI (URINARY TRACT INFECTION): Status: ACTIVE | Noted: 2020-11-20

## 2020-11-20 PROBLEM — K46.0 INCARCERATED HERNIA: Status: ACTIVE | Noted: 2020-11-20

## 2020-11-20 PROBLEM — N40.0 ENLARGED PROSTATE: Status: ACTIVE | Noted: 2020-11-20

## 2020-11-20 LAB
ALBUMIN SERPL BCP-MCNC: 4.5 G/DL (ref 3.2–4.9)
ALBUMIN/GLOB SERPL: 1.5 G/DL
ALP SERPL-CCNC: 132 U/L (ref 30–99)
ALT SERPL-CCNC: 10 U/L (ref 2–50)
ANION GAP SERPL CALC-SCNC: 11 MMOL/L (ref 7–16)
APPEARANCE UR: ABNORMAL
AST SERPL-CCNC: 16 U/L (ref 12–45)
BACTERIA #/AREA URNS HPF: ABNORMAL /HPF
BASOPHILS # BLD AUTO: 0 % (ref 0–1.8)
BASOPHILS # BLD: 0 K/UL (ref 0–0.12)
BILIRUB SERPL-MCNC: 0.8 MG/DL (ref 0.1–1.5)
BILIRUB UR QL STRIP.AUTO: NEGATIVE
BUN SERPL-MCNC: 13 MG/DL (ref 8–22)
CALCIUM SERPL-MCNC: 10.4 MG/DL (ref 8.5–10.5)
CHLORIDE SERPL-SCNC: 101 MMOL/L (ref 96–112)
CO2 SERPL-SCNC: 23 MMOL/L (ref 20–33)
COLOR UR: YELLOW
COVID ORDER STATUS COVID19: NORMAL
COVID ORDER STATUS COVID19: NORMAL
CREAT SERPL-MCNC: 1.19 MG/DL (ref 0.5–1.4)
EOSINOPHIL # BLD AUTO: 0 K/UL (ref 0–0.51)
EOSINOPHIL NFR BLD: 0 % (ref 0–6.9)
EPI CELLS #/AREA URNS HPF: ABNORMAL /HPF
ERYTHROCYTE [DISTWIDTH] IN BLOOD BY AUTOMATED COUNT: 48.7 FL (ref 35.9–50)
FLUAV RNA SPEC QL NAA+PROBE: NEGATIVE
FLUBV RNA SPEC QL NAA+PROBE: NEGATIVE
GLOBULIN SER CALC-MCNC: 3 G/DL (ref 1.9–3.5)
GLUCOSE SERPL-MCNC: 165 MG/DL (ref 65–99)
GLUCOSE UR STRIP.AUTO-MCNC: NEGATIVE MG/DL
HCT VFR BLD AUTO: 48.1 % (ref 42–52)
HGB BLD-MCNC: 15.2 G/DL (ref 14–18)
KETONES UR STRIP.AUTO-MCNC: ABNORMAL MG/DL
LACTATE BLD-SCNC: 1.3 MMOL/L (ref 0.5–2)
LEUKOCYTE ESTERASE UR QL STRIP.AUTO: ABNORMAL
LIPASE SERPL-CCNC: 14 U/L (ref 11–82)
LYMPHOCYTES # BLD AUTO: 99 K/UL (ref 1–4.8)
LYMPHOCYTES NFR BLD: 91.5 % (ref 22–41)
MAGNESIUM SERPL-MCNC: 2.1 MG/DL (ref 1.5–2.5)
MANUAL DIFF BLD: NORMAL
MCH RBC QN AUTO: 28.5 PG (ref 27–33)
MCHC RBC AUTO-ENTMCNC: 31.6 G/DL (ref 33.7–35.3)
MCV RBC AUTO: 90.1 FL (ref 81.4–97.8)
MICRO URNS: ABNORMAL
MONOCYTES # BLD AUTO: 0 K/UL (ref 0–0.85)
MONOCYTES NFR BLD AUTO: 0 % (ref 0–13.4)
MORPHOLOGY BLD-IMP: NORMAL
NEUTROPHILS # BLD AUTO: 9.2 K/UL (ref 1.82–7.42)
NEUTROPHILS NFR BLD: 8.5 % (ref 44–72)
NITRITE UR QL STRIP.AUTO: NEGATIVE
NRBC # BLD AUTO: 0 K/UL
NRBC BLD-RTO: 0 /100 WBC
OVALOCYTES BLD QL SMEAR: NORMAL
PH UR STRIP.AUTO: 7 [PH] (ref 5–8)
PLATELET # BLD AUTO: 214 K/UL (ref 164–446)
PLATELET BLD QL SMEAR: NORMAL
PMV BLD AUTO: 11.9 FL (ref 9–12.9)
POIKILOCYTOSIS BLD QL SMEAR: NORMAL
POTASSIUM SERPL-SCNC: 5 MMOL/L (ref 3.6–5.5)
PROT SERPL-MCNC: 7.5 G/DL (ref 6–8.2)
PROT UR QL STRIP: 100 MG/DL
RBC # BLD AUTO: 5.34 M/UL (ref 4.7–6.1)
RBC # URNS HPF: >150 /HPF
RBC BLD AUTO: PRESENT
RBC UR QL AUTO: ABNORMAL
RSV RNA SPEC QL NAA+PROBE: NEGATIVE
SARS-COV+SARS-COV-2 AG RESP QL IA.RAPID: NOTDETECTED
SARS-COV-2 RNA RESP QL NAA+PROBE: NOTDETECTED
SODIUM SERPL-SCNC: 135 MMOL/L (ref 135–145)
SP GR UR REFRACTOMETRY: >1.045
SPECIMEN SOURCE: NORMAL
SPECIMEN SOURCE: NORMAL
UROBILINOGEN UR STRIP.AUTO-MCNC: 0.2 MG/DL
WBC # BLD AUTO: 108.2 K/UL (ref 4.8–10.8)
WBC #/AREA URNS HPF: ABNORMAL /HPF

## 2020-11-20 PROCEDURE — 700101 HCHG RX REV CODE 250: Performed by: ANESTHESIOLOGY

## 2020-11-20 PROCEDURE — 83735 ASSAY OF MAGNESIUM: CPT

## 2020-11-20 PROCEDURE — C1765 ADHESION BARRIER: HCPCS | Performed by: SURGERY

## 2020-11-20 PROCEDURE — 87040 BLOOD CULTURE FOR BACTERIA: CPT | Mod: 91

## 2020-11-20 PROCEDURE — 770006 HCHG ROOM/CARE - MED/SURG/GYN SEMI*

## 2020-11-20 PROCEDURE — 83605 ASSAY OF LACTIC ACID: CPT

## 2020-11-20 PROCEDURE — 160031 HCHG SURGERY MINUTES - 1ST 30 MINS LEVEL 5: Performed by: SURGERY

## 2020-11-20 PROCEDURE — 87426 SARSCOV CORONAVIRUS AG IA: CPT

## 2020-11-20 PROCEDURE — 80053 COMPREHEN METABOLIC PANEL: CPT

## 2020-11-20 PROCEDURE — 99223 1ST HOSP IP/OBS HIGH 75: CPT | Mod: AI | Performed by: STUDENT IN AN ORGANIZED HEALTH CARE EDUCATION/TRAINING PROGRAM

## 2020-11-20 PROCEDURE — 87077 CULTURE AEROBIC IDENTIFY: CPT

## 2020-11-20 PROCEDURE — 99291 CRITICAL CARE FIRST HOUR: CPT

## 2020-11-20 PROCEDURE — 700117 HCHG RX CONTRAST REV CODE 255: Performed by: EMERGENCY MEDICINE

## 2020-11-20 PROCEDURE — 85027 COMPLETE CBC AUTOMATED: CPT

## 2020-11-20 PROCEDURE — 160042 HCHG SURGERY MINUTES - EA ADDL 1 MIN LEVEL 5: Performed by: SURGERY

## 2020-11-20 PROCEDURE — 160035 HCHG PACU - 1ST 60 MINS PHASE I: Performed by: SURGERY

## 2020-11-20 PROCEDURE — 501450 HCHG STAPLES, ENDO MULTIFIRE: Performed by: SURGERY

## 2020-11-20 PROCEDURE — 700105 HCHG RX REV CODE 258: Performed by: EMERGENCY MEDICINE

## 2020-11-20 PROCEDURE — 81001 URINALYSIS AUTO W/SCOPE: CPT

## 2020-11-20 PROCEDURE — 700111 HCHG RX REV CODE 636 W/ 250 OVERRIDE (IP): Performed by: ANESTHESIOLOGY

## 2020-11-20 PROCEDURE — 87150 DNA/RNA AMPLIFIED PROBE: CPT

## 2020-11-20 PROCEDURE — 85007 BL SMEAR W/DIFF WBC COUNT: CPT

## 2020-11-20 PROCEDURE — 160048 HCHG OR STATISTICAL LEVEL 1-5: Performed by: SURGERY

## 2020-11-20 PROCEDURE — 87086 URINE CULTURE/COLONY COUNT: CPT

## 2020-11-20 PROCEDURE — 74177 CT ABD & PELVIS W/CONTRAST: CPT

## 2020-11-20 PROCEDURE — 0WQF0ZZ REPAIR ABDOMINAL WALL, OPEN APPROACH: ICD-10-PCS | Performed by: SURGERY

## 2020-11-20 PROCEDURE — 160036 HCHG PACU - EA ADDL 30 MINS PHASE I: Performed by: SURGERY

## 2020-11-20 PROCEDURE — 700111 HCHG RX REV CODE 636 W/ 250 OVERRIDE (IP): Performed by: EMERGENCY MEDICINE

## 2020-11-20 PROCEDURE — 700105 HCHG RX REV CODE 258: Performed by: STUDENT IN AN ORGANIZED HEALTH CARE EDUCATION/TRAINING PROGRAM

## 2020-11-20 PROCEDURE — 501838 HCHG SUTURE GENERAL: Performed by: SURGERY

## 2020-11-20 PROCEDURE — 160009 HCHG ANES TIME/MIN: Performed by: SURGERY

## 2020-11-20 PROCEDURE — 160002 HCHG RECOVERY MINUTES (STAT): Performed by: SURGERY

## 2020-11-20 PROCEDURE — 0241U HCHG SARS-COV-2 COVID-19 NFCT DS RESP RNA 4 TRGT MIC: CPT

## 2020-11-20 PROCEDURE — 83690 ASSAY OF LIPASE: CPT

## 2020-11-20 PROCEDURE — 96374 THER/PROPH/DIAG INJ IV PUSH: CPT

## 2020-11-20 RX ORDER — SODIUM CHLORIDE 9 MG/ML
1000 INJECTION, SOLUTION INTRAVENOUS ONCE
Status: COMPLETED | OUTPATIENT
Start: 2020-11-20 | End: 2020-11-20

## 2020-11-20 RX ORDER — SODIUM CHLORIDE, SODIUM LACTATE, POTASSIUM CHLORIDE, CALCIUM CHLORIDE 600; 310; 30; 20 MG/100ML; MG/100ML; MG/100ML; MG/100ML
INJECTION, SOLUTION INTRAVENOUS CONTINUOUS
Status: DISCONTINUED | OUTPATIENT
Start: 2020-11-20 | End: 2020-11-21

## 2020-11-20 RX ORDER — OXYCODONE HYDROCHLORIDE 5 MG/1
5 TABLET ORAL
Status: DISCONTINUED | OUTPATIENT
Start: 2020-11-20 | End: 2020-11-20

## 2020-11-20 RX ORDER — HYDROMORPHONE HYDROCHLORIDE 1 MG/ML
0.4 INJECTION, SOLUTION INTRAMUSCULAR; INTRAVENOUS; SUBCUTANEOUS
Status: DISCONTINUED | OUTPATIENT
Start: 2020-11-20 | End: 2020-11-21 | Stop reason: HOSPADM

## 2020-11-20 RX ORDER — OXYCODONE HYDROCHLORIDE AND ACETAMINOPHEN 5; 325 MG/1; MG/1
2 TABLET ORAL
Status: DISCONTINUED | OUTPATIENT
Start: 2020-11-20 | End: 2020-11-20

## 2020-11-20 RX ORDER — POLYETHYLENE GLYCOL 3350 17 G/17G
1 POWDER, FOR SOLUTION ORAL
Status: DISCONTINUED | OUTPATIENT
Start: 2020-11-20 | End: 2020-11-21

## 2020-11-20 RX ORDER — ROCURONIUM BROMIDE 10 MG/ML
INJECTION, SOLUTION INTRAVENOUS PRN
Status: DISCONTINUED | OUTPATIENT
Start: 2020-11-20 | End: 2020-11-21 | Stop reason: SURG

## 2020-11-20 RX ORDER — OXYCODONE HYDROCHLORIDE AND ACETAMINOPHEN 5; 325 MG/1; MG/1
1 TABLET ORAL
Status: DISCONTINUED | OUTPATIENT
Start: 2020-11-20 | End: 2020-11-20

## 2020-11-20 RX ORDER — POLYETHYLENE GLYCOL 3350 17 G/17G
1 POWDER, FOR SOLUTION ORAL 2 TIMES DAILY
Status: DISCONTINUED | OUTPATIENT
Start: 2020-11-21 | End: 2020-11-25 | Stop reason: HOSPADM

## 2020-11-20 RX ORDER — HYDROMORPHONE HYDROCHLORIDE 1 MG/ML
0.1 INJECTION, SOLUTION INTRAMUSCULAR; INTRAVENOUS; SUBCUTANEOUS
Status: DISCONTINUED | OUTPATIENT
Start: 2020-11-20 | End: 2020-11-21 | Stop reason: HOSPADM

## 2020-11-20 RX ORDER — SODIUM CHLORIDE, SODIUM LACTATE, POTASSIUM CHLORIDE, CALCIUM CHLORIDE 600; 310; 30; 20 MG/100ML; MG/100ML; MG/100ML; MG/100ML
INJECTION, SOLUTION INTRAVENOUS CONTINUOUS
Status: DISCONTINUED | OUTPATIENT
Start: 2020-11-20 | End: 2020-11-21 | Stop reason: HOSPADM

## 2020-11-20 RX ORDER — MEPERIDINE HYDROCHLORIDE 50 MG/ML
12.5 INJECTION INTRAMUSCULAR; INTRAVENOUS; SUBCUTANEOUS
Status: DISCONTINUED | OUTPATIENT
Start: 2020-11-20 | End: 2020-11-21 | Stop reason: HOSPADM

## 2020-11-20 RX ORDER — AMOXICILLIN 250 MG
2 CAPSULE ORAL 2 TIMES DAILY
Status: DISCONTINUED | OUTPATIENT
Start: 2020-11-20 | End: 2020-11-21

## 2020-11-20 RX ORDER — LABETALOL HYDROCHLORIDE 5 MG/ML
5 INJECTION, SOLUTION INTRAVENOUS
Status: DISCONTINUED | OUTPATIENT
Start: 2020-11-20 | End: 2020-11-21 | Stop reason: HOSPADM

## 2020-11-20 RX ORDER — BISACODYL 10 MG
10 SUPPOSITORY, RECTAL RECTAL
Status: DISCONTINUED | OUTPATIENT
Start: 2020-11-20 | End: 2020-11-25 | Stop reason: HOSPADM

## 2020-11-20 RX ORDER — BISACODYL 10 MG
10 SUPPOSITORY, RECTAL RECTAL
Status: DISCONTINUED | OUTPATIENT
Start: 2020-11-20 | End: 2020-11-21

## 2020-11-20 RX ORDER — FAMOTIDINE 20 MG/1
20 TABLET, FILM COATED ORAL DAILY
Status: DISCONTINUED | OUTPATIENT
Start: 2020-11-21 | End: 2020-11-21

## 2020-11-20 RX ORDER — AMOXICILLIN 250 MG
1 CAPSULE ORAL NIGHTLY
Status: DISCONTINUED | OUTPATIENT
Start: 2020-11-21 | End: 2020-11-25 | Stop reason: HOSPADM

## 2020-11-20 RX ORDER — AMOXICILLIN 250 MG
1 CAPSULE ORAL
Status: DISCONTINUED | OUTPATIENT
Start: 2020-11-20 | End: 2020-11-25 | Stop reason: HOSPADM

## 2020-11-20 RX ORDER — DOCUSATE SODIUM 100 MG/1
100 CAPSULE, LIQUID FILLED ORAL 2 TIMES DAILY
Status: DISCONTINUED | OUTPATIENT
Start: 2020-11-21 | End: 2020-11-25 | Stop reason: HOSPADM

## 2020-11-20 RX ORDER — DIPHENHYDRAMINE HYDROCHLORIDE 50 MG/ML
12.5 INJECTION INTRAMUSCULAR; INTRAVENOUS
Status: DISCONTINUED | OUTPATIENT
Start: 2020-11-20 | End: 2020-11-21 | Stop reason: HOSPADM

## 2020-11-20 RX ORDER — SUCCINYLCHOLINE/SOD CL,ISO/PF 200MG/10ML
SYRINGE (ML) INTRAVENOUS PRN
Status: DISCONTINUED | OUTPATIENT
Start: 2020-11-20 | End: 2020-11-21 | Stop reason: SURG

## 2020-11-20 RX ORDER — ONDANSETRON 4 MG/1
4 TABLET, ORALLY DISINTEGRATING ORAL EVERY 4 HOURS PRN
Status: DISCONTINUED | OUTPATIENT
Start: 2020-11-20 | End: 2020-11-25 | Stop reason: HOSPADM

## 2020-11-20 RX ORDER — ONDANSETRON 2 MG/ML
4 INJECTION INTRAMUSCULAR; INTRAVENOUS EVERY 4 HOURS PRN
Status: DISCONTINUED | OUTPATIENT
Start: 2020-11-20 | End: 2020-11-21

## 2020-11-20 RX ORDER — CEFOTETAN DISODIUM 2 G/20ML
INJECTION, POWDER, FOR SOLUTION INTRAMUSCULAR; INTRAVENOUS PRN
Status: DISCONTINUED | OUTPATIENT
Start: 2020-11-20 | End: 2020-11-21 | Stop reason: HOSPADM

## 2020-11-20 RX ORDER — ONDANSETRON 2 MG/ML
4 INJECTION INTRAMUSCULAR; INTRAVENOUS EVERY 4 HOURS PRN
Status: DISCONTINUED | OUTPATIENT
Start: 2020-11-20 | End: 2020-11-25 | Stop reason: HOSPADM

## 2020-11-20 RX ORDER — HALOPERIDOL 5 MG/ML
1 INJECTION INTRAMUSCULAR
Status: DISCONTINUED | OUTPATIENT
Start: 2020-11-20 | End: 2020-11-21 | Stop reason: HOSPADM

## 2020-11-20 RX ORDER — ACETAMINOPHEN 325 MG/1
650 TABLET ORAL EVERY 6 HOURS PRN
Status: DISCONTINUED | OUTPATIENT
Start: 2020-11-20 | End: 2020-11-25 | Stop reason: HOSPADM

## 2020-11-20 RX ORDER — LABETALOL HYDROCHLORIDE 5 MG/ML
10 INJECTION, SOLUTION INTRAVENOUS EVERY 4 HOURS PRN
Status: DISCONTINUED | OUTPATIENT
Start: 2020-11-20 | End: 2020-11-25 | Stop reason: HOSPADM

## 2020-11-20 RX ORDER — MIDAZOLAM HYDROCHLORIDE 1 MG/ML
INJECTION INTRAMUSCULAR; INTRAVENOUS PRN
Status: DISCONTINUED | OUTPATIENT
Start: 2020-11-20 | End: 2020-11-21 | Stop reason: SURG

## 2020-11-20 RX ORDER — MORPHINE SULFATE 10 MG/ML
4 INJECTION, SOLUTION INTRAMUSCULAR; INTRAVENOUS
Status: DISCONTINUED | OUTPATIENT
Start: 2020-11-20 | End: 2020-11-21

## 2020-11-20 RX ORDER — SODIUM CHLORIDE, SODIUM LACTATE, POTASSIUM CHLORIDE, CALCIUM CHLORIDE 600; 310; 30; 20 MG/100ML; MG/100ML; MG/100ML; MG/100ML
INJECTION, SOLUTION INTRAVENOUS CONTINUOUS
Status: DISCONTINUED | OUTPATIENT
Start: 2020-11-21 | End: 2020-11-21

## 2020-11-20 RX ORDER — HYDROMORPHONE HYDROCHLORIDE 1 MG/ML
0.2 INJECTION, SOLUTION INTRAMUSCULAR; INTRAVENOUS; SUBCUTANEOUS
Status: DISCONTINUED | OUTPATIENT
Start: 2020-11-20 | End: 2020-11-21 | Stop reason: HOSPADM

## 2020-11-20 RX ORDER — DEXAMETHASONE SODIUM PHOSPHATE 4 MG/ML
INJECTION, SOLUTION INTRA-ARTICULAR; INTRALESIONAL; INTRAMUSCULAR; INTRAVENOUS; SOFT TISSUE PRN
Status: DISCONTINUED | OUTPATIENT
Start: 2020-11-20 | End: 2020-11-21 | Stop reason: SURG

## 2020-11-20 RX ORDER — ONDANSETRON 2 MG/ML
4 INJECTION INTRAMUSCULAR; INTRAVENOUS
Status: DISCONTINUED | OUTPATIENT
Start: 2020-11-20 | End: 2020-11-21 | Stop reason: HOSPADM

## 2020-11-20 RX ORDER — LIDOCAINE HYDROCHLORIDE 20 MG/ML
INJECTION, SOLUTION EPIDURAL; INFILTRATION; INTRACAUDAL; PERINEURAL PRN
Status: DISCONTINUED | OUTPATIENT
Start: 2020-11-20 | End: 2020-11-21 | Stop reason: SURG

## 2020-11-20 RX ORDER — ENEMA 19; 7 G/133ML; G/133ML
1 ENEMA RECTAL
Status: DISCONTINUED | OUTPATIENT
Start: 2020-11-20 | End: 2020-11-25 | Stop reason: HOSPADM

## 2020-11-20 RX ORDER — OXYCODONE HYDROCHLORIDE 5 MG/1
2.5 TABLET ORAL
Status: DISCONTINUED | OUTPATIENT
Start: 2020-11-20 | End: 2020-11-20

## 2020-11-20 RX ADMIN — MIDAZOLAM HYDROCHLORIDE 1 MG: 1 INJECTION, SOLUTION INTRAMUSCULAR; INTRAVENOUS at 23:20

## 2020-11-20 RX ADMIN — LIDOCAINE HYDROCHLORIDE 60 MG: 20 INJECTION, SOLUTION EPIDURAL; INFILTRATION; INTRACAUDAL at 23:24

## 2020-11-20 RX ADMIN — IOHEXOL 80 ML: 350 INJECTION, SOLUTION INTRAVENOUS at 19:43

## 2020-11-20 RX ADMIN — FENTANYL CITRATE 50 MCG: 50 INJECTION, SOLUTION INTRAMUSCULAR; INTRAVENOUS at 23:43

## 2020-11-20 RX ADMIN — CEFOTETAN DISODIUM 2 G: 2 INJECTION, POWDER, FOR SOLUTION INTRAMUSCULAR; INTRAVENOUS at 23:24

## 2020-11-20 RX ADMIN — SODIUM CHLORIDE 1000 ML: 9 INJECTION, SOLUTION INTRAVENOUS at 21:57

## 2020-11-20 RX ADMIN — PROPOFOL 150 MG: 10 INJECTION, EMULSION INTRAVENOUS at 23:24

## 2020-11-20 RX ADMIN — SODIUM CHLORIDE 1000 ML: 9 INJECTION, SOLUTION INTRAVENOUS at 21:19

## 2020-11-20 RX ADMIN — ROCURONIUM BROMIDE 30 MG: 10 INJECTION, SOLUTION INTRAVENOUS at 23:31

## 2020-11-20 RX ADMIN — CEFTRIAXONE SODIUM 2 G: 2 INJECTION, POWDER, FOR SOLUTION INTRAMUSCULAR; INTRAVENOUS at 21:56

## 2020-11-20 RX ADMIN — Medication 70 MG: at 23:24

## 2020-11-20 RX ADMIN — DEXAMETHASONE SODIUM PHOSPHATE 8 MG: 4 INJECTION, SOLUTION INTRA-ARTICULAR; INTRALESIONAL; INTRAMUSCULAR; INTRAVENOUS; SOFT TISSUE at 23:31

## 2020-11-20 RX ADMIN — SODIUM CHLORIDE, POTASSIUM CHLORIDE, SODIUM LACTATE AND CALCIUM CHLORIDE: 600; 310; 30; 20 INJECTION, SOLUTION INTRAVENOUS at 23:18

## 2020-11-20 RX ADMIN — FENTANYL CITRATE 50 MCG: 50 INJECTION, SOLUTION INTRAMUSCULAR; INTRAVENOUS at 23:54

## 2020-11-20 ASSESSMENT — LIFESTYLE VARIABLES
DOES PATIENT WANT TO STOP DRINKING: NO
DO YOU DRINK ALCOHOL: NO

## 2020-11-20 NOTE — ED TRIAGE NOTES
Wally Joel  80 y.o.  Chief Complaint   Patient presents with   • Abdominal Pain     lower abd pain since yesterday; reports N/V, no diarrhea; pt recently rechecked for CA, reports his WBC ct is normal   denies trauma to abd; currently NAD in triage

## 2020-11-21 LAB
ALBUMIN SERPL BCP-MCNC: 3.3 G/DL (ref 3.2–4.9)
ALBUMIN/GLOB SERPL: 1.3 G/DL
ALP SERPL-CCNC: 101 U/L (ref 30–99)
ALT SERPL-CCNC: 6 U/L (ref 2–50)
ANION GAP SERPL CALC-SCNC: 10 MMOL/L (ref 7–16)
AST SERPL-CCNC: 12 U/L (ref 12–45)
BASOPHILS # BLD AUTO: 0 % (ref 0–1.8)
BASOPHILS # BLD: 0 K/UL (ref 0–0.12)
BILIRUB SERPL-MCNC: 0.3 MG/DL (ref 0.1–1.5)
BUN SERPL-MCNC: 14 MG/DL (ref 8–22)
CALCIUM SERPL-MCNC: 9.1 MG/DL (ref 8.5–10.5)
CHLORIDE SERPL-SCNC: 105 MMOL/L (ref 96–112)
CO2 SERPL-SCNC: 24 MMOL/L (ref 20–33)
CREAT SERPL-MCNC: 1.1 MG/DL (ref 0.5–1.4)
EOSINOPHIL # BLD AUTO: 0 K/UL (ref 0–0.51)
EOSINOPHIL NFR BLD: 0 % (ref 0–6.9)
ERYTHROCYTE [DISTWIDTH] IN BLOOD BY AUTOMATED COUNT: 48.5 FL (ref 35.9–50)
GLOBULIN SER CALC-MCNC: 2.5 G/DL (ref 1.9–3.5)
GLUCOSE SERPL-MCNC: 147 MG/DL (ref 65–99)
HCT VFR BLD AUTO: 39.9 % (ref 42–52)
HGB BLD-MCNC: 12.7 G/DL (ref 14–18)
LYMPHOCYTES # BLD AUTO: 68.5 K/UL (ref 1–4.8)
LYMPHOCYTES NFR BLD: 85.2 % (ref 22–41)
MANUAL DIFF BLD: NORMAL
MCH RBC QN AUTO: 28.9 PG (ref 27–33)
MCHC RBC AUTO-ENTMCNC: 31.8 G/DL (ref 33.7–35.3)
MCV RBC AUTO: 90.9 FL (ref 81.4–97.8)
MONOCYTES # BLD AUTO: 0 K/UL (ref 0–0.85)
MONOCYTES NFR BLD AUTO: 0 % (ref 0–13.4)
MORPHOLOGY BLD-IMP: NORMAL
NEUTROPHILS # BLD AUTO: 11.9 K/UL (ref 1.82–7.42)
NEUTROPHILS NFR BLD: 14.8 % (ref 44–72)
NRBC # BLD AUTO: 0 K/UL
NRBC BLD-RTO: 0 /100 WBC
PLATELET # BLD AUTO: 149 K/UL (ref 164–446)
PLATELET BLD QL SMEAR: NORMAL
PMV BLD AUTO: 12.1 FL (ref 9–12.9)
POTASSIUM SERPL-SCNC: 4.6 MMOL/L (ref 3.6–5.5)
PROT SERPL-MCNC: 5.8 G/DL (ref 6–8.2)
RBC # BLD AUTO: 4.39 M/UL (ref 4.7–6.1)
RBC BLD AUTO: PRESENT
SMUDGE CELLS BLD QL SMEAR: NORMAL
SODIUM SERPL-SCNC: 139 MMOL/L (ref 135–145)
WBC # BLD AUTO: 80.4 K/UL (ref 4.8–10.8)

## 2020-11-21 PROCEDURE — 85007 BL SMEAR W/DIFF WBC COUNT: CPT

## 2020-11-21 PROCEDURE — 700111 HCHG RX REV CODE 636 W/ 250 OVERRIDE (IP): Performed by: ANESTHESIOLOGY

## 2020-11-21 PROCEDURE — 700111 HCHG RX REV CODE 636 W/ 250 OVERRIDE (IP): Performed by: SURGERY

## 2020-11-21 PROCEDURE — 700111 HCHG RX REV CODE 636 W/ 250 OVERRIDE (IP): Performed by: STUDENT IN AN ORGANIZED HEALTH CARE EDUCATION/TRAINING PROGRAM

## 2020-11-21 PROCEDURE — 80053 COMPREHEN METABOLIC PANEL: CPT

## 2020-11-21 PROCEDURE — 700101 HCHG RX REV CODE 250: Performed by: ANESTHESIOLOGY

## 2020-11-21 PROCEDURE — 770006 HCHG ROOM/CARE - MED/SURG/GYN SEMI*

## 2020-11-21 PROCEDURE — 85027 COMPLETE CBC AUTOMATED: CPT

## 2020-11-21 PROCEDURE — 97161 PT EVAL LOW COMPLEX 20 MIN: CPT

## 2020-11-21 PROCEDURE — 99232 SBSQ HOSP IP/OBS MODERATE 35: CPT | Performed by: STUDENT IN AN ORGANIZED HEALTH CARE EDUCATION/TRAINING PROGRAM

## 2020-11-21 PROCEDURE — 700105 HCHG RX REV CODE 258: Performed by: ANESTHESIOLOGY

## 2020-11-21 PROCEDURE — 36415 COLL VENOUS BLD VENIPUNCTURE: CPT

## 2020-11-21 PROCEDURE — 700105 HCHG RX REV CODE 258: Performed by: STUDENT IN AN ORGANIZED HEALTH CARE EDUCATION/TRAINING PROGRAM

## 2020-11-21 RX ORDER — ONDANSETRON 2 MG/ML
INJECTION INTRAMUSCULAR; INTRAVENOUS PRN
Status: DISCONTINUED | OUTPATIENT
Start: 2020-11-21 | End: 2020-11-21 | Stop reason: SURG

## 2020-11-21 RX ORDER — OXYCODONE HYDROCHLORIDE AND ACETAMINOPHEN 5; 325 MG/1; MG/1
1 TABLET ORAL
Status: DISCONTINUED | OUTPATIENT
Start: 2020-11-21 | End: 2020-11-21 | Stop reason: HOSPADM

## 2020-11-21 RX ORDER — DIPHENHYDRAMINE HYDROCHLORIDE 50 MG/ML
12.5 INJECTION INTRAMUSCULAR; INTRAVENOUS
Status: DISCONTINUED | OUTPATIENT
Start: 2020-11-21 | End: 2020-11-21 | Stop reason: HOSPADM

## 2020-11-21 RX ORDER — HALOPERIDOL 5 MG/ML
1 INJECTION INTRAMUSCULAR
Status: DISCONTINUED | OUTPATIENT
Start: 2020-11-21 | End: 2020-11-21 | Stop reason: HOSPADM

## 2020-11-21 RX ORDER — MORPHINE SULFATE 4 MG/ML
4 INJECTION, SOLUTION INTRAMUSCULAR; INTRAVENOUS
Status: DISCONTINUED | OUTPATIENT
Start: 2020-11-21 | End: 2020-11-25 | Stop reason: HOSPADM

## 2020-11-21 RX ORDER — MEPERIDINE HYDROCHLORIDE 25 MG/ML
12.5 INJECTION INTRAMUSCULAR; INTRAVENOUS; SUBCUTANEOUS
Status: DISCONTINUED | OUTPATIENT
Start: 2020-11-21 | End: 2020-11-21 | Stop reason: HOSPADM

## 2020-11-21 RX ORDER — DEXTROSE, SODIUM CHLORIDE, SODIUM LACTATE, POTASSIUM CHLORIDE, AND CALCIUM CHLORIDE 5; .6; .31; .03; .02 G/100ML; G/100ML; G/100ML; G/100ML; G/100ML
INJECTION, SOLUTION INTRAVENOUS CONTINUOUS
Status: DISCONTINUED | OUTPATIENT
Start: 2020-11-21 | End: 2020-11-24

## 2020-11-21 RX ORDER — ONDANSETRON 2 MG/ML
4 INJECTION INTRAMUSCULAR; INTRAVENOUS
Status: COMPLETED | OUTPATIENT
Start: 2020-11-21 | End: 2020-11-21

## 2020-11-21 RX ORDER — SODIUM CHLORIDE, SODIUM LACTATE, POTASSIUM CHLORIDE, CALCIUM CHLORIDE 600; 310; 30; 20 MG/100ML; MG/100ML; MG/100ML; MG/100ML
INJECTION, SOLUTION INTRAVENOUS CONTINUOUS
Status: DISCONTINUED | OUTPATIENT
Start: 2020-11-21 | End: 2020-11-21 | Stop reason: HOSPADM

## 2020-11-21 RX ORDER — HYDROMORPHONE HYDROCHLORIDE 1 MG/ML
0.4 INJECTION, SOLUTION INTRAMUSCULAR; INTRAVENOUS; SUBCUTANEOUS
Status: DISCONTINUED | OUTPATIENT
Start: 2020-11-21 | End: 2020-11-21 | Stop reason: HOSPADM

## 2020-11-21 RX ORDER — HYDROMORPHONE HYDROCHLORIDE 1 MG/ML
0.1 INJECTION, SOLUTION INTRAMUSCULAR; INTRAVENOUS; SUBCUTANEOUS
Status: DISCONTINUED | OUTPATIENT
Start: 2020-11-21 | End: 2020-11-21 | Stop reason: HOSPADM

## 2020-11-21 RX ORDER — HYDROMORPHONE HYDROCHLORIDE 1 MG/ML
0.2 INJECTION, SOLUTION INTRAMUSCULAR; INTRAVENOUS; SUBCUTANEOUS
Status: DISCONTINUED | OUTPATIENT
Start: 2020-11-21 | End: 2020-11-21 | Stop reason: HOSPADM

## 2020-11-21 RX ORDER — OXYCODONE HYDROCHLORIDE AND ACETAMINOPHEN 5; 325 MG/1; MG/1
2 TABLET ORAL
Status: DISCONTINUED | OUTPATIENT
Start: 2020-11-21 | End: 2020-11-21 | Stop reason: HOSPADM

## 2020-11-21 RX ORDER — LABETALOL HYDROCHLORIDE 5 MG/ML
5 INJECTION, SOLUTION INTRAVENOUS
Status: DISCONTINUED | OUTPATIENT
Start: 2020-11-21 | End: 2020-11-21 | Stop reason: HOSPADM

## 2020-11-21 RX ADMIN — MORPHINE SULFATE 4 MG: 10 INJECTION INTRAVENOUS at 06:57

## 2020-11-21 RX ADMIN — SODIUM CHLORIDE, SODIUM LACTATE, POTASSIUM CHLORIDE, CALCIUM CHLORIDE AND DEXTROSE MONOHYDRATE: 5; 600; 310; 30; 20 INJECTION, SOLUTION INTRAVENOUS at 12:12

## 2020-11-21 RX ADMIN — ONDANSETRON 4 MG: 2 INJECTION INTRAMUSCULAR; INTRAVENOUS at 00:39

## 2020-11-21 RX ADMIN — MORPHINE SULFATE 4 MG: 4 INJECTION INTRAVENOUS at 23:35

## 2020-11-21 RX ADMIN — SODIUM CHLORIDE, POTASSIUM CHLORIDE, SODIUM LACTATE AND CALCIUM CHLORIDE: 600; 310; 30; 20 INJECTION, SOLUTION INTRAVENOUS at 00:42

## 2020-11-21 RX ADMIN — SUGAMMADEX 200 MG: 100 INJECTION, SOLUTION INTRAVENOUS at 00:00

## 2020-11-21 RX ADMIN — ONDANSETRON 4 MG: 2 INJECTION INTRAMUSCULAR; INTRAVENOUS at 00:05

## 2020-11-21 RX ADMIN — FENTANYL CITRATE 25 MCG: 50 INJECTION, SOLUTION INTRAMUSCULAR; INTRAVENOUS at 00:36

## 2020-11-21 RX ADMIN — FENTANYL CITRATE 25 MCG: 50 INJECTION, SOLUTION INTRAMUSCULAR; INTRAVENOUS at 00:41

## 2020-11-21 RX ADMIN — ENOXAPARIN SODIUM 40 MG: 40 INJECTION SUBCUTANEOUS at 06:46

## 2020-11-21 RX ADMIN — CEFTRIAXONE SODIUM 1 G: 1 INJECTION, POWDER, FOR SOLUTION INTRAMUSCULAR; INTRAVENOUS at 17:29

## 2020-11-21 RX ADMIN — FENTANYL CITRATE 50 MCG: 50 INJECTION, SOLUTION INTRAMUSCULAR; INTRAVENOUS at 00:05

## 2020-11-21 ASSESSMENT — ENCOUNTER SYMPTOMS
CHILLS: 0
DIZZINESS: 0
FEVER: 0
WHEEZING: 0
HEADACHES: 0
SORE THROAT: 0
TINGLING: 0
DEPRESSION: 0
EYE REDNESS: 0
FOCAL WEAKNESS: 0
HALLUCINATIONS: 0
HEARTBURN: 0
EYE DISCHARGE: 0
WEIGHT LOSS: 0
PALPITATIONS: 0
VOMITING: 0
SENSORY CHANGE: 0
FALLS: 0
ABDOMINAL PAIN: 0
COUGH: 0
DIARRHEA: 0
CONSTIPATION: 0
INSOMNIA: 0
BACK PAIN: 0
NERVOUS/ANXIOUS: 0
NAUSEA: 0
SHORTNESS OF BREATH: 0

## 2020-11-21 ASSESSMENT — LIFESTYLE VARIABLES
ON A TYPICAL DAY WHEN YOU DRINK ALCOHOL HOW MANY DRINKS DO YOU HAVE: 1
TOTAL SCORE: 0
DOES PATIENT WANT TO STOP DRINKING: NO
HAVE YOU EVER FELT YOU SHOULD CUT DOWN ON YOUR DRINKING: NO
HAVE PEOPLE ANNOYED YOU BY CRITICIZING YOUR DRINKING: NO
AVERAGE NUMBER OF DAYS PER WEEK YOU HAVE A DRINK CONTAINING ALCOHOL: 1
CONSUMPTION TOTAL: NEGATIVE
TOTAL SCORE: 0
EVER FELT BAD OR GUILTY ABOUT YOUR DRINKING: NO
ALCOHOL_USE: NO
EVER HAD A DRINK FIRST THING IN THE MORNING TO STEADY YOUR NERVES TO GET RID OF A HANGOVER: NO
TOTAL SCORE: 0
HOW MANY TIMES IN THE PAST YEAR HAVE YOU HAD 5 OR MORE DRINKS IN A DAY: 0
SUBSTANCE_ABUSE: 0

## 2020-11-21 ASSESSMENT — PAIN DESCRIPTION - PAIN TYPE
TYPE: SURGICAL PAIN
TYPE: ACUTE PAIN;SURGICAL PAIN
TYPE: SURGICAL PAIN
TYPE: ACUTE PAIN;SURGICAL PAIN
TYPE: SURGICAL PAIN
TYPE: ACUTE PAIN;SURGICAL PAIN
TYPE: SURGICAL PAIN
TYPE: ACUTE PAIN;SURGICAL PAIN

## 2020-11-21 ASSESSMENT — PATIENT HEALTH QUESTIONNAIRE - PHQ9
2. FEELING DOWN, DEPRESSED, IRRITABLE, OR HOPELESS: NOT AT ALL
SUM OF ALL RESPONSES TO PHQ9 QUESTIONS 1 AND 2: 0
1. LITTLE INTEREST OR PLEASURE IN DOING THINGS: NOT AT ALL

## 2020-11-21 ASSESSMENT — COGNITIVE AND FUNCTIONAL STATUS - GENERAL
CLIMB 3 TO 5 STEPS WITH RAILING: A LITTLE
MOVING FROM LYING ON BACK TO SITTING ON SIDE OF FLAT BED: A LITTLE
DRESSING REGULAR LOWER BODY CLOTHING: A LITTLE
DRESSING REGULAR UPPER BODY CLOTHING: A LITTLE
CLIMB 3 TO 5 STEPS WITH RAILING: A LITTLE
MOVING TO AND FROM BED TO CHAIR: A LITTLE
STANDING UP FROM CHAIR USING ARMS: A LITTLE
DAILY ACTIVITIY SCORE: 18
EATING MEALS: A LITTLE
TOILETING: A LITTLE
TURNING FROM BACK TO SIDE WHILE IN FLAT BAD: A LITTLE
PERSONAL GROOMING: A LITTLE
MOBILITY SCORE: 18
SUGGESTED CMS G CODE MODIFIER DAILY ACTIVITY: CK
HELP NEEDED FOR BATHING: A LITTLE
MOVING FROM LYING ON BACK TO SITTING ON SIDE OF FLAT BED: A LITTLE
MOVING TO AND FROM BED TO CHAIR: A LITTLE
WALKING IN HOSPITAL ROOM: A LITTLE
SUGGESTED CMS G CODE MODIFIER MOBILITY: CK
WALKING IN HOSPITAL ROOM: A LITTLE
MOBILITY SCORE: 19
TURNING FROM BACK TO SIDE WHILE IN FLAT BAD: A LITTLE
SUGGESTED CMS G CODE MODIFIER MOBILITY: CK

## 2020-11-21 ASSESSMENT — GAIT ASSESSMENTS
DEVIATION: DECREASED BASE OF SUPPORT;OTHER (COMMENT)
DISTANCE (FEET): 200

## 2020-11-21 ASSESSMENT — PAIN SCALES - GENERAL: PAIN_LEVEL: 2

## 2020-11-21 NOTE — ANESTHESIA PREPROCEDURE EVALUATION
Relevant Problems   Other   (+) Chronic lymphocytic leukemia (CLL), B-cell (HCC)       Physical Exam    Airway   Mallampati: II  TM distance: >3 FB  Neck ROM: full       Cardiovascular - normal exam  Rhythm: regular  Rate: normal  (-) murmur     Dental - normal exam           Pulmonary - normal exam  Breath sounds clear to auscultation     Abdominal    Neurological - normal exam                 Anesthesia Plan    ASA 3- EMERGENT   ASA physical status 3 criteria: other (comment)ASA physical status emergent criteria: acutely contaminated wound or identified infection source, acute peritonitis and acute deteriorating condition due to infection    Plan - general       Airway plan will be ETT        Induction: intravenous    Postoperative Plan: Postoperative administration of opioids is intended.    Pertinent diagnostic labs and testing reviewed    Informed Consent:    Anesthetic plan and risks discussed with patient.    Use of blood products discussed with: patient whom consented to blood products.

## 2020-11-21 NOTE — PROGRESS NOTES
[facility] Medicine Daily Progress Note    Date of Service  11/21/2020    Chief Complaint  80 y.o. male admitted 11/20/2020 with complaints of suprapubic abdominal pain that has occurred intermittently over the past few months, and has been worsening, becoming unbearable over the past few days.     Hospital Course  complaints of suprapubic abdominal pain that has occurred intermittently over the past few months, and has been worsening, becoming unbearable over the past few days. He states that he lives in [place].  He was seen by a nurse practitioner there and they encouraged him to seek care at [facility].  He went to [facility] and they diagnosed him with a urinary tract infection.  Patient did not fill his medications yet though and came here to the ED for a second opinion.  Patient has history of abdominal ventral hernia surgery in the past x2 with residual hernia.  He notes that he had an operation by Dr. Suero in approximately 2014.  He notes that over the past 2 days he has noted that he has been unable to reduce one of his hernias.  Had episodes of vomiting as recently as 2 days ago.  Has had history of TURP as well.  He is followed by Dr. Tang from urology.     CT of abdomen performed in the ER shows:Right periumbilical hernia containing small bowel loops with associated proximal small bowel obstruction.  Enlarged prostate.  Patient is seen to have a white blood cell count of 108, likely secondary to CLL.  Urinalysis shows packed white blood cells and RBC greater than 150, proteinuria 100.   oncology Dr. Salazar is consulted in the ER and request patient to be scheduled to follow-up outpatient prior to discharge.  Dr. Galicia with general surgery is consulted in the ER and is taking patient for urgent surgery for incarcerated hernia. Exploratory laparotomy with repair of incarcerated ventral hernia. On 11/21/2020.    Interval Problem Update  States he has pain when he moves or cough; no pain when he is lying still.    He is aware of CLL since 2016; oncology suggested outpatient follow-up.  Patient denies any symptom of weight change, anorexia,  fatigue, bleeding sign from anywhere; no sign of infection.  Have not passed gas yet    Consultants/Specialty  Surgery  oncology    Code Status  Full Code    Disposition  Likely home    Review of Systems  Review of Systems   Constitutional: Negative for chills, fever, malaise/fatigue and weight loss.   HENT: Negative for congestion and sore throat.    Eyes: Negative for discharge and redness.   Respiratory: Negative for cough, shortness of breath and wheezing.    Cardiovascular: Negative for chest pain, palpitations and leg swelling.   Gastrointestinal: Negative for abdominal pain, constipation, diarrhea, heartburn, nausea and vomiting.   Genitourinary: Negative for dysuria, frequency and urgency.   Musculoskeletal: Negative for back pain, falls and joint pain.   Skin: Negative for itching and rash.   Neurological: Negative for dizziness, tingling, sensory change, focal weakness and headaches.   Psychiatric/Behavioral: Negative for depression, hallucinations and substance abuse. The patient is not nervous/anxious and does not have insomnia.    All other systems reviewed and are negative.       Physical Exam  Temp:  [35.7 °C (96.2 °F)-37.4 °C (99.4 °F)] 36.2 °C (97.2 °F)  Pulse:  [68-91] 90  Resp:  [12-33] 17  BP: (109-153)/(64-90) 112/73  SpO2:  [93 %-99 %] 95 %    Physical Exam  Vitals signs and nursing note reviewed.   Constitutional:       General: He is not in acute distress.     Appearance: Normal appearance.   HENT:      Head: Normocephalic and atraumatic.      Nose: Nose normal. No congestion or rhinorrhea.      Mouth/Throat:      Pharynx: Oropharynx is clear. No posterior oropharyngeal erythema.   Eyes:      Extraocular Movements: Extraocular movements intact.      Conjunctiva/sclera: Conjunctivae normal.      Pupils: Pupils are equal, round, and reactive to light.   Neck:       Musculoskeletal: Normal range of motion and neck supple.      Vascular: No carotid bruit.   Cardiovascular:      Rate and Rhythm: Normal rate and regular rhythm.      Pulses: Normal pulses.      Heart sounds: Normal heart sounds.   Pulmonary:      Effort: Pulmonary effort is normal. No respiratory distress.      Breath sounds: Normal breath sounds. No wheezing.   Chest:      Chest wall: No tenderness.   Abdominal:      General: Abdomen is flat. Bowel sounds are normal. There is no distension.      Palpations: Abdomen is soft.      Tenderness: There is no abdominal tenderness. There is no guarding or rebound.      Comments: Dressing in the need abdomen, clean   Musculoskeletal: Normal range of motion.      Left lower leg: No edema.   Skin:     General: Skin is warm.   Neurological:      General: No focal deficit present.      Mental Status: He is alert and oriented to person, place, and time.      Cranial Nerves: No cranial nerve deficit.      Motor: No weakness.      Gait: Gait normal.      Deep Tendon Reflexes: Reflexes normal.   Psychiatric:         Mood and Affect: Mood normal.         Behavior: Behavior normal.         Judgment: Judgment normal.         Fluids    Intake/Output Summary (Last 24 hours) at 11/21/2020 1146  Last data filed at 11/21/2020 0122  Gross per 24 hour   Intake 910 ml   Output 100 ml   Net 810 ml       Laboratory  Recent Labs     11/20/20  1646 11/21/20  0452   .2* 80.4*   RBC 5.34 4.39*   HEMOGLOBIN 15.2 12.7*   HEMATOCRIT 48.1 39.9*   MCV 90.1 90.9   MCH 28.5 28.9   MCHC 31.6* 31.8*   RDW 48.7 48.5   PLATELETCT 214 149*   MPV 11.9 12.1     Recent Labs     11/20/20  1646 11/21/20  0452   SODIUM 135 139   POTASSIUM 5.0 4.6   CHLORIDE 101 105   CO2 23 24   GLUCOSE 165* 147*   BUN 13 14   CREATININE 1.19 1.10   CALCIUM 10.4 9.1                   Imaging  CT-ABDOMEN-PELVIS WITH   Final Result         1.  Right periumbilical hernia containing small bowel loops with associated proximal  small bowel obstruction.   2.  Enlarged heterogeneous prostate, consider causes a prostate enlargement including prostate malignancy.   3.  Left iliac adenopathy, consider causes of adenopathy including neoplastic disease.   4.  Irregular bladder wall thickening, further evaluation with cystoscopy for further characterization.   5.  Cholelithiasis   6.  Atherosclerosis      These findings were discussed with the patient's clinician, NIDHI CAVAZOS, on 11/20/2020 8:19 PM.           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 Salazar oncology consulted in the ER and will follow up outpatient    White blood cell 108.2    Enlarged prostate  Assessment & Plan  Seen on CT  S/p turp  Follows with Dr. Tang, Urology  Hx of CLL since 2014,   Urinating without difficulty currently    Incarcerated hernia- (present on admission)  Assessment & Plan  CT of abdomen performed in the ER shows:Right periumbilical hernia containing small bowel loops with associated proximal small bowel obstruction.    Taken for repair with Dr. Galicia urgently   Exploratory laparotomy with repair of incarcerated ventral hernia.   Pain management  NG tube decompression  Has not passing gas yet    UTI (urinary tract infection)  Assessment & Plan  Awaiting urine cultures  Rocephin day 2, total 5 days  IV fluids at 100 cc/h       VTE prophylaxis: Lovenox

## 2020-11-21 NOTE — ANESTHESIA POSTPROCEDURE EVALUATION
Patient: Wally Joel    Procedure Summary     Date: 11/20/20 Room / Location: [facility] 09 / SURGERY [facility]    Anesthesia Start: 2318 Anesthesia Stop: 11/21/20 0010    Procedures:       LAPAROTOMY, EXPLORATORY, PSB BOWEL REPAIR (N/A Abdomen)      REPAIR, HERNIA, VENTRAL (N/A Abdomen) Diagnosis: (Incarcerated ventral hernia)    Surgeons: Pili Galicia M.D. Responsible Provider: Rashida Owens M.D.    Anesthesia Type: general ASA Status: 3 - Emergent          Final Anesthesia Type: general  Last vitals  BP   Blood Pressure : 137/79    Temp   37.4 °C (99.4 °F)    Pulse   Pulse: 86   Resp   16    SpO2   97 %      Anesthesia Post Evaluation    Patient location during evaluation: PACU  Patient participation: complete - patient participated  Level of consciousness: awake and alert  Pain score: 2    Airway patency: patent  Anesthetic complications: no  Cardiovascular status: hemodynamically stable  Respiratory status: acceptable  Hydration status: euvolemic    PONV: none           Nurse Pain Score: 0 (NPRS)

## 2020-11-21 NOTE — ANESTHESIA TIME REPORT
Anesthesia Start and Stop Event Times     Date Time Event    11/20/2020 2217 Ready for Procedure     2318 Anesthesia Start    11/21/2020 0010 Anesthesia Stop        Responsible Staff  11/20/20 to 11/21/20    Name Role Begin End    Rashida Owens M.D. Anesth 2318 0010        Preop Diagnosis (Free Text):  Pre-op Diagnosis     Incarcerated ventral hernia        Preop Diagnosis (Codes):    Post op Diagnosis  Incarcerated ventral hernia      Premium Reason  B. 1st Call    Comments:

## 2020-11-21 NOTE — PROGRESS NOTES
"    DATE: 11/21/2020    Post Operative Day  1 Exploratory laparotomy with repair of incarcerated ventral hernia.    Interval Events:  Status post OR, pain controlled this a.m.    PHYSICAL EXAMINATION:  Vital Signs: /66   Pulse 85   Temp 37.2 °C (99 °F) (Temporal)   Resp 18   Ht 1.6 m (5' 3\")   Wt 67.5 kg (148 lb 13 oz)   SpO2 96%     Soft, nontender, dressing in place.    Laboratory Values:   Recent Labs     11/20/20 1646 11/21/20  0452   .2* 80.4*   RBC 5.34 4.39*   HEMOGLOBIN 15.2 12.7*   HEMATOCRIT 48.1 39.9*   MCV 90.1 90.9   MCH 28.5 28.9   MCHC 31.6* 31.8*   RDW 48.7 48.5   PLATELETCT 214 149*   MPV 11.9 12.1     Recent Labs     11/20/20  1646 11/21/20  0452   SODIUM 135 139   POTASSIUM 5.0 4.6   CHLORIDE 101 105   CO2 23 24   GLUCOSE 165* 147*   BUN 13 14   CREATININE 1.19 1.10   CALCIUM 10.4 9.1     Recent Labs     11/20/20  1646 11/21/20  0452   ASTSGOT 16 12   ALTSGPT 10 6   TBILIRUBIN 0.8 0.3   ALKPHOSPHAT 132* 101*   GLOBULIN 3.0 2.5            Imaging:   CT-ABDOMEN-PELVIS WITH   Final Result         1.  Right periumbilical hernia containing small bowel loops with associated proximal small bowel obstruction.   2.  Enlarged heterogeneous prostate, consider causes a prostate enlargement including prostate malignancy.   3.  Left iliac adenopathy, consider causes of adenopathy including neoplastic disease.   4.  Irregular bladder wall thickening, further evaluation with cystoscopy for further characterization.   5.  Cholelithiasis   6.  Atherosclerosis      These findings were discussed with the patient's clinician, NIDHI CAVAZOS, on 11/20/2020 8:19 PM.          ASSESSMENT AND PLAN:     Incisional hernia- (present on admission)  Assessment & Plan  11/20 -ex lap on repair incarcerated ventral hernia    Recommend NG tube in place until bowel function, ambulation, appreciate primary medical team's management.       ____________________________________     Issa Grijalva M.D.    DD: 11/21/2020  " 6:37 AM

## 2020-11-21 NOTE — OP REPORT
DATE OF SERVICE:  11/20/2020    PREOPERATIVE DIAGNOSIS:  Small-bowel obstruction with incarcerated ventral   hernia.    POSTOPERATIVE DIAGNOSIS:  Small-bowel obstruction with incarcerated ventral   hernia.    PROCEDURES:  Exploratory celiotomy and primary repair of ventral hernia.    SURGEON:  Pili Galicia MD    ASSISTANT:  REINA Bautista    ANESTHESIA:  General endotracheal.    ANESTHESIOLOGIST:  Rashida Owens MD    INDICATIONS:  The patient is a 80-year-old gentleman who has a ventral hernia.    This has been bothering for few months, but over the last 24 hours, became   hard and incarcerated.  He has not had a bowel movement in 24 hours.  He was   brought to the emergency room where he was found to have evidence of an   incarcerated ventral hernia with bowel obstruction.  He is being brought at   this time for repair.    FINDINGS:  Ventral hernia that was incarcerated with small bowel.  The small   bowel was viable.  It was reduced back into the abdominal cavity.  Primary   repair was performed with a ventral hernia.    DESCRIPTION OF PROCEDURE:  After the patient was identified and consented, he   was brought to the operating room and placed in supine position.  Patient   underwent general endotracheal anesthetic clearance.  Patient's abdomen was   prepped and draped in sterile fashion.  Midline incision was carried out.    Upon entering the abdominal cavity, the aforementioned findings were noted.    The hernia sac was opened.  The bowel appeared to be viable.  Once that was   completed, the bowel was returned to the abdominal cavity.  The fascial defect   was then fully defined and closed with interrupted 0 Vicryls,  subcutaneous   tissue was approximated with 3-0 Vicryl, skin was closed with running 4-0 in   subcuticular fashion.  Steri-Strips and dry dressing placed on the wounds.    Patient was extubated and taken to recovery room in stable condition.  All   sponge and needle counts were  correct.       ____________________________________     MD RICKY SANDHU / CECI    DD:  11/20/2020 23:53:31  DT:  11/21/2020 00:10:10    D#:  [id number]  Job#:  [id number]    cc: Rashida Owens MD

## 2020-11-21 NOTE — OR NURSING
Pt is alert and oriented to person, place, and situation. Pain is tolerable at 4/10 after pain medications given per MAR. Pt has a midline abdominal dressing of staples, 4x4 gauze, and medipore tape. VSS. Pt is on 2L NC with SpO2 at 96%. [facility] Pt is NPO at this time with an NG tube in Right nare to low continuous suction with small amount of brown drainage. Pt is now sleeping comfortably on gurney.

## 2020-11-21 NOTE — ANESTHESIA PROCEDURE NOTES
Airway    Date/Time: 11/20/2020 11:25 PM  Performed by: Rashida Owens M.D.  Authorized by: Rashida Owens M.D.     Location:  OR  Urgency:  Elective  Difficult Airway: No    Indications for Airway Management:  Anesthesia      Spontaneous Ventilation: absent    Sedation Level:  Deep  Preoxygenated: Yes    Patient Position:  Sniffing  Mask Difficulty Assessment:  0 - not attempted  Final Airway Type:  Endotracheal airway  Final Endotracheal Airway:  ETT  Cuffed: Yes    Technique Used for Successful ETT Placement:  Direct laryngoscopy  Devices/Methods Used in Placement:  Cricoid pressure    Insertion Site:  Oral  Blade Type:  Leonardo  Laryngoscope Blade/Videolaryngoscope Blade Size:  4  ETT Size (mm):  7.5  Measured from:  Teeth  ETT to Teeth (cm):  23  Placement Verified by: auscultation and capnometry    Cormack-Lehane Classification:  Grade I - full view of glottis  Number of Attempts at Approach:  1

## 2020-11-21 NOTE — ASSESSMENT & PLAN NOTE
CT of abdomen performed in the ER shows:Right periumbilical hernia containing small bowel loops with associated proximal small bowel obstruction.  S/p Exploratory laparotomy with repair of incarcerated ventral hernia 11/21  Regular diet

## 2020-11-21 NOTE — H&P
Hospital Medicine History & Physical Note    Date of Service  11/20/2020    Primary Care Physician  Pcp Pt States None    Consultants  Dr. Galicia, general surgery, Dr. Salazar oncology    Code Status  Full Code    Chief Complaint  Chief Complaint   Patient presents with   • Abdominal Pain     lower abd pain since yesterday; reports N/V, no diarrhea; pt recently rechecked for CA, reports his WBC ct is normal       History of Presenting Illness  80 y.o. male with a past medical hx of CLL 2014, who presents to the Ed with complaints of suprapubic abdominal pain that has occurred intermittently over the past few months, and has been worsening, becoming unbearable over the past few days.  He states that he lives in [place].  He was seen by a nurse practitioner there and they encouraged him to seek care at [facility].  He went to [facility] and they diagnosed him with a urinary tract infection.  Patient did not fill his medications yet though and came here to the ED for a second opinion.  Patient has history of abdominal ventral hernia surgery in the past x2 with residual hernia.  He notes that he had an operation by Dr. Suero in approximately 2014.  He notes that over the past 2 days he has noted that he has been unable to reduce one of his hernias.  Had episodes of vomiting as recently as 2 days ago.  Has had history of TURP as well.  He is followed by Dr. Tang from urology.     CT of abdomen performed in the ER shows:Right periumbilical hernia containing small bowel loops with associated proximal small bowel obstruction.  Enlarged prostate.  Patient is seen to have a white blood cell count of 108, likely secondary to CLL.  Urinalysis shows packed white blood cells and RBC greater than 150, proteinuria 100.   oncology Dr. Salazar is consulted in the ER and request patient to be scheduled to follow-up outpatient prior to discharge.  Dr. Galicia with general surgery is consulted in the ER and is taking patient for urgent surgery for  incarcerated hernia.  Patient is admitted to hospitalist service for following incarcerated hernia repair with Dr. Galicia to follow his multiple comorbidities.     Review of Systems  Review of Systems   Constitutional: Positive for chills, diaphoresis and malaise/fatigue.   Gastrointestinal: Positive for abdominal pain and nausea.   All other systems reviewed and are negative.      Past Medical History   has a past medical history of Cancer (HCC), CLL (chronic lymphocytic leukemia) (HCC) (7/2014), Jaundice, and Urinary bladder disorder.    Surgical History   has a past surgical history that includes other abdominal surgery (2010); cystoscopy (10/29/2014); trans urethral resection prostate (10/29/2014); inguinal hernia laparoscopic (3/1/2017); other orthopedic surgery (1988, 1989); and cystoscopy (3/16/2018).     Family History  Sister: Kidney failure  Father: Liver cancer    Social History   reports that he has never smoked. He has never used smokeless tobacco. He reports current alcohol use. He reports that he does not use drugs.    Allergies  Allergies   Allergen Reactions   • Ciprofloxacin Unspecified     Lethargic.skin turned yellow, could not pee  [id number]       Medications  Prior to Admission Medications   Prescriptions Last Dose Informant Patient Reported? Taking?   Ascorbic Acid (VITAMIN C PO) 11/19/2020 at AM Patient Yes No   Sig: Take 1 Tab by mouth as needed.   Cyanocobalamin (VITAMIN B 12 PO) 11/19/2020 at AM Patient Yes No   Sig: Take 1 Tab by mouth as needed.      Facility-Administered Medications: None       Physical Exam  Temp:  [35.7 °C (96.2 °F)-37.4 °C (99.4 °F)] 35.7 °C (96.2 °F)  Pulse:  [68-88] 74  Resp:  [12-33] 14  BP: (111-153)/(64-90) 130/80  SpO2:  [93 %-99 %] 95 %     Temp:  [36 °C (96.8 °F)-36.6 °C (97.9 °F)] 36.6 °C (97.9 °F)  Pulse:  [68-88] 78  Resp:  [12-33] 16  BP: (111-153)/(64-90) 148/78  SpO2:  [93 %-97 %] 97 %     Physical Exam  Vitals signs and nursing note reviewed.    Constitutional:       General: He is in acute distress.      Appearance: Normal appearance. He is normal weight. He is not toxic-appearing.      Comments: Frail elderly male seen resting comfortably   HENT:      Head: Normocephalic and atraumatic.      Nose: Nose normal. No congestion.      Mouth/Throat:      Mouth: Mucous membranes are moist.      Pharynx: Oropharynx is clear. No oropharyngeal exudate or posterior oropharyngeal erythema.   Eyes:      Extraocular Movements: Extraocular movements intact.      Conjunctiva/sclera: Conjunctivae normal.      Pupils: Pupils are equal, round, and reactive to light.   Neck:      Musculoskeletal: Normal range of motion and neck supple.   Cardiovascular:      Rate and Rhythm: Normal rate and regular rhythm.      Pulses: Normal pulses.      Heart sounds: Normal heart sounds. No murmur. No friction rub. No gallop.    Pulmonary:      Effort: Pulmonary effort is normal. No respiratory distress.      Breath sounds: Normal breath sounds. Stridor present. No wheezing, rhonchi or rales.   Abdominal:      Palpations: Abdomen is soft. There is mass.      Tenderness: There is abdominal tenderness.      Comments: Abdomen with 4 cm x 5 cm area of elevation with mass located at the right periumbilical towards the RUQ with no erythema or lesion present, tender to palpation throughout RUQ, periumbilical area of abdomen with light palpation   Musculoskeletal: Normal range of motion.      Right lower leg: No edema.      Left lower leg: No edema.   Skin:     General: Skin is warm and dry.      Capillary Refill: Capillary refill takes less than 2 seconds.   Neurological:      General: No focal deficit present.      Mental Status: He is alert and oriented to person, place, and time.   Psychiatric:         Mood and Affect: Mood normal.         Behavior: Behavior normal.           Laboratory:  Recent Labs     11/20/20  1646   .2*   RBC 5.34   HEMOGLOBIN 15.2   HEMATOCRIT 48.1   MCV 90.1    MCH 28.5   MCHC 31.6*   RDW 48.7   PLATELETCT 214   MPV 11.9     Recent Labs     11/20/20  1646   SODIUM 135   POTASSIUM 5.0   CHLORIDE 101   CO2 23   GLUCOSE 165*   BUN 13   CREATININE 1.19   CALCIUM 10.4     Recent Labs     11/20/20  1646   ALTSGPT 10   ASTSGOT 16   ALKPHOSPHAT 132*   TBILIRUBIN 0.8   LIPASE 14   GLUCOSE 165*         No results for input(s): NTPROBNP in the last 72 hours.      No results for input(s): TROPONINT in the last 72 hours.    Imaging:  CT-ABDOMEN-PELVIS WITH   Final Result         1.  Right periumbilical hernia containing small bowel loops with associated proximal small bowel obstruction.   2.  Enlarged heterogeneous prostate, consider causes a prostate enlargement including prostate malignancy.   3.  Left iliac adenopathy, consider causes of adenopathy including neoplastic disease.   4.  Irregular bladder wall thickening, further evaluation with cystoscopy for further characterization.   5.  Cholelithiasis   6.  Atherosclerosis      These findings were discussed with the patient's clinician, NIDHI CAVAZOS, on 11/20/2020 8:19 PM.            Assessment/Plan:  I anticipate this patient will require at least two midnights for appropriate medical management, necessitating inpatient admission.    Chronic lymphocytic leukemia (CLL), B-cell (HCC)- (present on admission)  Assessment & Plan  Dr. Salazar oncology consulted in the ER and will follow up outpatient    White blood cell 108.2    Enlarged prostate  Assessment & Plan  Seen on CT  S/p turp  Follows with Dr. Tang, Urology  Hx of CLL since 2014,   Urinating without difficulty currently    Incarcerated hernia- (present on admission)  Assessment & Plan  CT of abdomen performed in the ER shows:Right periumbilical hernia containing small bowel loops with associated proximal small bowel obstruction.    Taken for repair with Dr. Galicia urgently   Continue to follow recommendations with gen. surg    UTI (urinary tract infection)  Assessment  & Plan  Awaiting urine cultures  Rocephin in ER  IV fluids at 125 cc/h

## 2020-11-21 NOTE — ASSESSMENT & PLAN NOTE
Patient is aware of CLL since 2016; Patient denies any symptom of weight change, anorexia,  fatigue, bleeding sign from anywhere; no sign of infection  Dr. Salazar oncology consulted in the ER and will follow up outpatient  White blood cell 108.2 on admission  Monitor

## 2020-11-21 NOTE — ED PROVIDER NOTES
ED Provider Note    CHIEF COMPLAINT  Chief Complaint   Patient presents with   • Abdominal Pain     lower abd pain since yesterday; reports N/V, no diarrhea; pt recently rechecked for CA, reports his WBC ct is normal       HPI  Wally Joel is a 80 y.o. male who presents with suprapubic abdominal pain over the past few days.  He states that he lives in [place].  He was seen by a nurse practitioner there and they encouraged him to seek care at [facility].  He went to [facility] and they diagnosed him with a urinary tract infection.  Patient did not fill his medications yet though came here for a second opinion.  Has had a history of urinary tract infection in the past.  No fever.  No back pain.  He states that he typically has a weak urinary stream though over the past few days he notes that his stream has been more normal.  No bloody stool or black stool.  No diarrhea.  Normal bowel movements.  Has history of abdominal ventral hernia surgery in the past x2 with residual hernia.  He notes that he had an operation by Dr. Suero in approximately 2014.  He notes that over the past 2 days he has noted that he has been unable to reduce one of his hernias.  Had episodes of vomiting as recently as 2 days ago.  Has had history of TURP as well.  He is followed by Dr. Tang from urology.  No other significant surgical history to the abdomen pelvis region.    REVIEW OF SYSTEMS  See HPI for further details. All other systems are negative.     PAST MEDICAL HISTORY   has a past medical history of Cancer (HCC), CLL (chronic lymphocytic leukemia) (HCC) (7/2014), Jaundice, and Urinary bladder disorder.    SOCIAL HISTORY  Social History     Tobacco Use   • Smoking status: Never Smoker   • Smokeless tobacco: Never Used   Substance and Sexual Activity   • Alcohol use: Yes     Comment: 3 per month   • Drug use: No   • Sexual activity: Not on file       SURGICAL HISTORY   has a past surgical history that includes other abdominal surgery  "(2010); cystoscopy (10/29/2014); trans urethral resection prostate (10/29/2014); inguinal hernia laparoscopic (3/1/2017); other orthopedic surgery (1988, 1989); and cystoscopy (3/16/2018).    CURRENT MEDICATIONS  Home Medications     Reviewed by Leatha Cerda R.N. (Registered Nurse) on 11/20/20 at 1450  Med List Status: Not Addressed   Medication Last Dose Status   Ascorbic Acid (VITAMIN C PO)  Active   Cyanocobalamin (VITAMIN B 12 PO)  Active                ALLERGIES  Allergies   Allergen Reactions   • Ciprofloxacin Unspecified     Lethargic.skin turned yellow, could not pee  [id number]       PHYSICAL EXAM  VITAL SIGNS: /89   Pulse 87   Temp 36 °C (96.8 °F) (Temporal)   Resp 16   Ht 1.6 m (5' 3\")   Wt 67.5 kg (148 lb 13 oz)   SpO2 97%   BMI 26.36 kg/m²   Pulse ox interpretation: I interpret this pulse ox as normal.  Constitutional: Alert in no apparent distress.  HENT: No signs of trauma, Bilateral external ears normal, Nose normal.   Eyes: Pupils are equal and reactive, Conjunctiva normal, Non-icteric.   Neck: Normal range of motion, No tenderness, Supple, No stridor.   Cardiovascular: Regular rate and rhythm.   Thorax & Lungs: Normal breath sounds, No respiratory distress, No wheezing, No chest tenderness.   Abdomen: Soft, lower abdominal tenderness, approximately 5 cm reducible gavin in the periumbilical region, No pulsatile masses. No peritoneal signs.  Skin: Warm, Dry, No erythema, No rash.   Back: No bony tenderness, No CVA tenderness.   Extremities: Intact distal pulses, No edema, No tenderness, No cyanosis  Musculoskeletal: Good range of motion in all major joints. No tenderness to palpation or major deformities noted.   Neurologic: Alert, Normal motor function and gait, Normal sensory function, No focal deficits noted.       DIAGNOSTIC STUDIES / PROCEDURES    EKG - Physician interpretation  No results found for this or any previous visit.      LABS  Labs Reviewed   CBC WITH DIFFERENTIAL - " Abnormal; Notable for the following components:       Result Value    .2 (*)     MCHC 31.6 (*)     Neutrophils-Polys 8.50 (*)     Lymphocytes 91.50 (*)     Neutrophils (Absolute) 9.20 (*)     Lymphs (Absolute) 99.00 (*)     All other components within normal limits   COMP METABOLIC PANEL - Abnormal; Notable for the following components:    Glucose 165 (*)     Alkaline Phosphatase 132 (*)     All other components within normal limits   URINALYSIS - Abnormal; Notable for the following components:    Character Turbid (*)     Ketones Trace (*)     Protein 100 (*)     Leukocyte Esterase Large (*)     Occult Blood Large (*)     All other components within normal limits   ESTIMATED GFR - Abnormal; Notable for the following components:    GFR If Non  59 (*)     All other components within normal limits   URINE MICROSCOPIC (W/UA) - Abnormal; Notable for the following components:    WBC Packed (*)     RBC >150 (*)     Bacteria Moderate (*)     All other components within normal limits   LIPASE   LACTIC ACID   DIFFERENTIAL MANUAL   PERIPHERAL SMEAR REVIEW   PLATELET ESTIMATE   MORPHOLOGY   REFRACTOMETER SG   COVID/SARS COV-2   BLOOD CULTURE   BLOOD CULTURE   URINE CULTURE-EXISTING-LESS THAN 48 HOURS         RADIOLOGY  CT-ABDOMEN-PELVIS WITH   Final Result         1.  Right periumbilical hernia containing small bowel loops with associated proximal small bowel obstruction.   2.  Enlarged heterogeneous prostate, consider causes a prostate enlargement including prostate malignancy.   3.  Left iliac adenopathy, consider causes of adenopathy including neoplastic disease.   4.  Irregular bladder wall thickening, further evaluation with cystoscopy for further characterization.   5.  Cholelithiasis   6.  Atherosclerosis      These findings were discussed with the patient's clinician, NIDHI CAVAZOS, on 11/20/2020 8:19 PM.            COURSE & MEDICAL DECISION MAKING    Medications   ondansetron (ZOFRAN ODT) dispertab  4 mg ( Oral MAR Unhold 11/21/20 0140)   labetalol (NORMODYNE/TRANDATE) injection 10 mg ( Intravenous MAR Unhold 11/21/20 0140)   acetaminophen (Tylenol) tablet 650 mg ( Oral MAR Unhold 11/21/20 0140)   Pharmacy Consult Request ...Pain Management Review 1 Each ( Other MAR Unhold 11/21/20 0140)   Respiratory Therapy Consult (has no administration in time range)   docusate sodium (COLACE) capsule 100 mg (0 mg Oral Held 11/21/20 0600)   senna-docusate (PERICOLACE or SENOKOT S) 8.6-50 MG per tablet 1 Tab (has no administration in time range)   senna-docusate (PERICOLACE or SENOKOT S) 8.6-50 MG per tablet 1 Tab (has no administration in time range)   polyethylene glycol/lytes (MIRALAX) PACKET 1 Packet (0 Packets Oral Held 11/21/20 0600)   magnesium hydroxide (MILK OF MAGNESIA) suspension 30 mL (0 mL Oral Held 11/21/20 0600)   bisacodyl (DULCOLAX) suppository 10 mg (has no administration in time range)   fleet enema 133 mL (has no administration in time range)   LR infusion (has no administration in time range)   enoxaparin (LOVENOX) inj 40 mg (40 mg Subcutaneous Given 11/21/20 0646)   morphine (pf) 10 mg/mL injection 4 mg (4 mg Intravenous Given 11/21/20 0657)   ondansetron (ZOFRAN) syringe/vial injection 4 mg (has no administration in time range)   cefTRIAXone (ROCEPHIN) 1 g in  mL IVPB (has no administration in time range)   iohexol (OMNIPAQUE) 350 mg/mL (80 mL Intravenous Given 11/20/20 1943)   NS infusion 1,000 mL (1,000 mL Intravenous New Bag 11/20/20 2119)   cefTRIAXone (ROCEPHIN) 2 g in  mL IVPB (2 g Intravenous New Bag 11/20/20 2156)   NS infusion 1,000 mL (1,000 mL Intravenous New Bag 11/20/20 2157)   ondansetron (ZOFRAN) syringe/vial injection 4 mg (4 mg Intravenous Given 11/21/20 0039)       Pertinent Labs & Imaging studies reviewed. (See chart for details)  80 y.o. male presenting with abdominal pain.  He was thought to have a urinary tract infection and was seen at a healthcare facility in [place]  yesterday.  He was prescribed antibiotics though has not filled them yet.  He is here for a second opinion.  Laboratory studies were performed.  Patient has significant elevation in white blood cell count with lymphocyte predominance.  According to the patient's medical record, he does have a history of CLL.  He states that he has been lost to follow-up with Dr. Wright for the past approximately 5 years.  No fevers.  No vomiting or back pain.  His pain is mostly in the lower abdomen.  Urinalysis is concerning for possible urinary tract infection.  He was started on IV antibiotics for this.  Patient also has an irreducible ventral hernia present.  Has had intermittent vomiting though last vomiting episode was a day and a half ago.  Continues to have bowel movements.  He states that his only oral intake today however was a protein shake such as Ensure.    CT abdomen pelvis was ordered for further evaluation of the patient's abdominal discomfort.  He was found to have a periumbilical hernia with small bowel obstruction.  Went to the patient's bedside and placed him flat with knees bent and over several minutes try to manually reduce the hernia though I was unsuccessful.  Ultimately contacted Dr. Andrews, on call for Dr Suero, the patient's surgeon, he is requesting that I contact the on-call general surgeon to address the incarcerated hernia.  I did speak with Dr. Galicia who is agreeable to evaluate the patient for possible surgical intervention.    Patient is hemodynamically stable.  Lactate is normal.  Low suspicion for strangulation at this time.    With regards to the patient's markedly elevated white blood cell count and concern for development of possible hyperviscosity syndrome, he was given IV fluid hydration.  The patient is also n.p.o. status prior to potential surgical intervention.    Due to the patient's multiple medical comorbidities including CLL and active urinary tract infection with prior history  "of TURP and concerns for possible prostate cancer on CT, the patient will be admitted to the hospitalist service.  Spoke with the hospitalist who is agreeable to be hospitalization.    I did talk to Dr. Salazar on call for Dr. Wright.  Recommending outpatient follow-up for the patient's CLL.    The patient will not drink alcohol nor drive with prescribed medications.   The patient was instructed to follow-up with primary care physician for further management.  To return immediately for any worsening symptoms or development of any other concerning signs or symptoms. The patient verbalizes understanding in their own words.    /89   Pulse 87   Temp 36 °C (96.8 °F) (Temporal)   Resp 16   Ht 1.6 m (5' 3\")   Wt 67.5 kg (148 lb 13 oz)   SpO2 97%   BMI 26.36 kg/m²     The total critical care time on this patient is 35 minutes, resuscitating patient, speaking with admitting physician, and deciphering test results. This 35 minutes is exclusive of separately billable procedures.    FINAL IMPRESSION  Incarcerated hernia  Small bowel obstruction  UTI  Prostate enlargement  CLL      Electronically signed by: Desean Hernandez M.D., 11/20/2020 5:16 PM    "

## 2020-11-21 NOTE — ASSESSMENT & PLAN NOTE
Seen on CT  S/p TURP  Follows with Dr. Tang, Urology  Off roman, urinating without difficulty currently

## 2020-11-21 NOTE — DIETARY
Nutrition Services: Brief Update    Consult received for tubefeed from MD. Noted pt is s/p ex lap with repair of incarcerated ventral hernia yesterday 11/20  with NG to suction per discussion with RN. Discussed with MD, consult in error and MD discontinued consult.     RD to follow per department policy guidelines.

## 2020-11-21 NOTE — OR NURSING
Oxygen 02 tank level is 283 L, which is above the ½ august required for transport. Tubing connected to device and to patient. Oxygen tank stored safely in saleh prior to movement of patient. Gave report to recieving RN who will be assuming care of patient. All belongings and paper chart are with patient. Transport at bedside to bring patient to room. Signed ticket to ride.

## 2020-11-21 NOTE — CONSULTS
DATE OF CONSULTATION:  11/20/2020     REFERRING PHYSICIAN:   Desean Hernandez M.D.     CONSULTING PHYSICIAN:  Pili Galicia M.D.     REASON FOR CONSULTATION:  Incarcerated ventral hernia  .   I have been asked by  to see the patient in surgical consultation for evaluation of incarcerated ventral hernia.    HISTORY OF PRESENT ILLNESS: The patient is a 80 year-old White man who presents to the Emergency Department with a 1- day history of moderate generalized abdominal pain. The pain is associated with nausea and vomiting. The patient denies any recent or intercurrent illness. The patient has undergone vetnral hernia repair. The patient denies any previous surgery for obstructive symptoms..     PAST MEDICAL HISTORY:  has a past medical history of Cancer (HCC), CLL (chronic lymphocytic leukemia) (HCC) (7/2014), Jaundice, and Urinary bladder disorder.    PAST SURGICAL HISTORY:  has a past surgical history that includes other abdominal surgery (2010); cystoscopy (10/29/2014); trans urethral resection prostate (10/29/2014); inguinal hernia laparoscopic (3/1/2017); other orthopedic surgery (1988, 1989); and cystoscopy (3/16/2018).     ALLERGIES:   Allergies   Allergen Reactions   • Ciprofloxacin Unspecified     Lethargic.skin turned yellow, could not pee  BSE=[id number]        CURRENT MEDICATIONS:   Home Medications     Reviewed by Caryn Gaitan (Pharmacy Tech) on 11/20/20 at 2101  Med List Status: Complete   Medication Last Dose Status   Ascorbic Acid (VITAMIN C PO) 11/19/2020 Active   Cyanocobalamin (VITAMIN B 12 PO) 11/19/2020 Active                FAMILY HISTORY: History reviewed. No pertinent family history.    SOCIAL HISTORY:   Social History     Tobacco Use   • Smoking status: Never Smoker   • Smokeless tobacco: Never Used   Substance and Sexual Activity   • Alcohol use: Yes     Comment: 3 per month   • Drug use: No   • Sexual activity: Not on file       REVIEW OF SYSTEMS: Comprehensive review of systems  "is negative with the exception of the aforementioned HPI, PMH, and PSH bullets in accordance with CMS guidelines.     PHYSICAL EXAMINATION:   General Appearance: The patient is a well appointed man in no critical distress.  VITAL SIGNS: /75   Pulse 83   Temp 36.6 °C (97.9 °F) (Temporal)   Resp 18   Ht 1.6 m (5' 3\")   Wt 67.5 kg (148 lb 13 oz)   SpO2 96%   HEAD AND NECK: Demonstrates symmetric, reactive pupils.  Nares and oropharynx are clear.   NECK: Supple.   CHEST:    Inspection: Unlabored respirations, no intercostal retractions, paradoxical motion, or accessory muscle use.    .  CARDIOVASCULAR:   Inspection: The skin is warm.     ABDOMEN:   Inspection: Abdominal inspection reveals hard mass in the periumbilical area..   Palpation: Palpation is remarkable for moderate tenderness in the periumbilical region. strangulated, moderately tender periumbilical ventral incisional hernia present.  EXTREMITIES:   Examination of the upper and lower extremities demonstrates No cyanosis, edema, or clubbing of the nails.  NEUROLOGIC:   Neurologic examination reveals no focal deficits noted.  .    LABORATORY VALUES:   Recent Labs     11/20/20  1646   .2*   RBC 5.34   HEMOGLOBIN 15.2   HEMATOCRIT 48.1   MCV 90.1   MCH 28.5   MCHC 31.6*   RDW 48.7   PLATELETCT 214   MPV 11.9     Recent Labs     11/20/20  1646   SODIUM 135   POTASSIUM 5.0   CHLORIDE 101   CO2 23   GLUCOSE 165*   BUN 13   CREATININE 1.19   CALCIUM 10.4     Recent Labs     11/20/20  1646   ASTSGOT 16   ALTSGPT 10   TBILIRUBIN 0.8   ALKPHOSPHAT 132*   GLOBULIN 3.0            IMAGING:   CT-ABDOMEN-PELVIS WITH   Final Result         1.  Right periumbilical hernia containing small bowel loops with associated proximal small bowel obstruction.   2.  Enlarged heterogeneous prostate, consider causes a prostate enlargement including prostate malignancy.   3.  Left iliac adenopathy, consider causes of adenopathy including neoplastic disease.   4.  Irregular " bladder wall thickening, further evaluation with cystoscopy for further characterization.   5.  Cholelithiasis   6.  Atherosclerosis      These findings were discussed with the patient's clinician, NIDHI CAVAZOS, on 11/20/2020 8:19 PM.          IMPRESSION AND PLAN:  Incisional hernia- (present on admission)  Assessment & Plan  Incarcerated ventral hernia with SBO  Plan ex lap, repair of ventral hernia, possible bowel resection    Procedure described as well as the risks including bleeding, infection, need for bowel resection and anesthetic risks. They understand and wish to proceed.           ____________________________________     Pili Galicia M.D.    DD: 11/20/2020  9:56 PM

## 2020-11-22 LAB
ALBUMIN SERPL BCP-MCNC: 3.1 G/DL (ref 3.2–4.9)
ALBUMIN/GLOB SERPL: 1.2 G/DL
ALP SERPL-CCNC: 94 U/L (ref 30–99)
ALT SERPL-CCNC: 6 U/L (ref 2–50)
ANION GAP SERPL CALC-SCNC: 5 MMOL/L (ref 7–16)
ANISOCYTOSIS BLD QL SMEAR: ABNORMAL
AST SERPL-CCNC: 13 U/L (ref 12–45)
BASOPHILS # BLD AUTO: 0 % (ref 0–1.8)
BASOPHILS # BLD: 0 K/UL (ref 0–0.12)
BILIRUB SERPL-MCNC: 0.5 MG/DL (ref 0.1–1.5)
BUN SERPL-MCNC: 12 MG/DL (ref 8–22)
CALCIUM SERPL-MCNC: 8.8 MG/DL (ref 8.5–10.5)
CHLORIDE SERPL-SCNC: 100 MMOL/L (ref 96–112)
CHOLEST SERPL-MCNC: 122 MG/DL (ref 100–199)
CO2 SERPL-SCNC: 29 MMOL/L (ref 20–33)
CREAT SERPL-MCNC: 1.06 MG/DL (ref 0.5–1.4)
EOSINOPHIL # BLD AUTO: 0 K/UL (ref 0–0.51)
EOSINOPHIL NFR BLD: 0 % (ref 0–6.9)
ERYTHROCYTE [DISTWIDTH] IN BLOOD BY AUTOMATED COUNT: 47.9 FL (ref 35.9–50)
GLOBULIN SER CALC-MCNC: 2.6 G/DL (ref 1.9–3.5)
GLUCOSE SERPL-MCNC: 135 MG/DL (ref 65–99)
HCT VFR BLD AUTO: 39.4 % (ref 42–52)
HDLC SERPL-MCNC: 26 MG/DL
HGB BLD-MCNC: 12.2 G/DL (ref 14–18)
LDLC SERPL CALC-MCNC: 69 MG/DL
LYMPHOCYTES # BLD AUTO: 77.61 K/UL (ref 1–4.8)
LYMPHOCYTES NFR BLD: 87.3 % (ref 22–41)
MANUAL DIFF BLD: NORMAL
MCH RBC QN AUTO: 28.3 PG (ref 27–33)
MCHC RBC AUTO-ENTMCNC: 31 G/DL (ref 33.7–35.3)
MCV RBC AUTO: 91.4 FL (ref 81.4–97.8)
MICROCYTES BLD QL SMEAR: ABNORMAL
MONOCYTES # BLD AUTO: 1.51 K/UL (ref 0–0.85)
MONOCYTES NFR BLD AUTO: 1.7 % (ref 0–13.4)
MORPHOLOGY BLD-IMP: NORMAL
NEUTROPHILS # BLD AUTO: 9.78 K/UL (ref 1.82–7.42)
NEUTROPHILS NFR BLD: 11 % (ref 44–72)
NRBC # BLD AUTO: 0 K/UL
NRBC BLD-RTO: 0 /100 WBC
PLATELET # BLD AUTO: 153 K/UL (ref 164–446)
PLATELET BLD QL SMEAR: NORMAL
PMV BLD AUTO: 11.6 FL (ref 9–12.9)
POTASSIUM SERPL-SCNC: 4.1 MMOL/L (ref 3.6–5.5)
PROT SERPL-MCNC: 5.7 G/DL (ref 6–8.2)
RBC # BLD AUTO: 4.31 M/UL (ref 4.7–6.1)
RBC BLD AUTO: PRESENT
SMUDGE CELLS BLD QL SMEAR: NORMAL
SODIUM SERPL-SCNC: 134 MMOL/L (ref 135–145)
TRIGL SERPL-MCNC: 136 MG/DL (ref 0–149)
WBC # BLD AUTO: 88.9 K/UL (ref 4.8–10.8)

## 2020-11-22 PROCEDURE — 36415 COLL VENOUS BLD VENIPUNCTURE: CPT

## 2020-11-22 PROCEDURE — 99232 SBSQ HOSP IP/OBS MODERATE 35: CPT | Performed by: STUDENT IN AN ORGANIZED HEALTH CARE EDUCATION/TRAINING PROGRAM

## 2020-11-22 PROCEDURE — 700111 HCHG RX REV CODE 636 W/ 250 OVERRIDE (IP): Performed by: SURGERY

## 2020-11-22 PROCEDURE — 85027 COMPLETE CBC AUTOMATED: CPT

## 2020-11-22 PROCEDURE — 700105 HCHG RX REV CODE 258: Performed by: STUDENT IN AN ORGANIZED HEALTH CARE EDUCATION/TRAINING PROGRAM

## 2020-11-22 PROCEDURE — 770006 HCHG ROOM/CARE - MED/SURG/GYN SEMI*

## 2020-11-22 PROCEDURE — 85007 BL SMEAR W/DIFF WBC COUNT: CPT

## 2020-11-22 PROCEDURE — 80061 LIPID PANEL: CPT

## 2020-11-22 PROCEDURE — 700111 HCHG RX REV CODE 636 W/ 250 OVERRIDE (IP): Performed by: STUDENT IN AN ORGANIZED HEALTH CARE EDUCATION/TRAINING PROGRAM

## 2020-11-22 PROCEDURE — 80053 COMPREHEN METABOLIC PANEL: CPT

## 2020-11-22 RX ADMIN — CEFTRIAXONE SODIUM 1 G: 1 INJECTION, POWDER, FOR SOLUTION INTRAMUSCULAR; INTRAVENOUS at 17:56

## 2020-11-22 RX ADMIN — SODIUM CHLORIDE, SODIUM LACTATE, POTASSIUM CHLORIDE, CALCIUM CHLORIDE AND DEXTROSE MONOHYDRATE: 5; 600; 310; 30; 20 INJECTION, SOLUTION INTRAVENOUS at 00:22

## 2020-11-22 RX ADMIN — ENOXAPARIN SODIUM 40 MG: 40 INJECTION SUBCUTANEOUS at 05:53

## 2020-11-22 RX ADMIN — SODIUM CHLORIDE, SODIUM LACTATE, POTASSIUM CHLORIDE, CALCIUM CHLORIDE AND DEXTROSE MONOHYDRATE: 5; 600; 310; 30; 20 INJECTION, SOLUTION INTRAVENOUS at 18:01

## 2020-11-22 ASSESSMENT — ENCOUNTER SYMPTOMS
EYE DISCHARGE: 0
TINGLING: 0
HEADACHES: 0
WEIGHT LOSS: 0
EYE REDNESS: 0
FOCAL WEAKNESS: 0
VOMITING: 0
DIARRHEA: 0
FEVER: 0
WHEEZING: 0
HALLUCINATIONS: 0
INSOMNIA: 0
CONSTIPATION: 0
NERVOUS/ANXIOUS: 0
FALLS: 0
BACK PAIN: 0
NAUSEA: 0
SORE THROAT: 0
COUGH: 0
PALPITATIONS: 0
ABDOMINAL PAIN: 0
CHILLS: 0
SENSORY CHANGE: 0
DEPRESSION: 0
HEARTBURN: 0
DIZZINESS: 0
SHORTNESS OF BREATH: 0

## 2020-11-22 ASSESSMENT — COPD QUESTIONNAIRES
DURING THE PAST 4 WEEKS HOW MUCH DID YOU FEEL SHORT OF BREATH: NONE/LITTLE OF THE TIME
HAVE YOU SMOKED AT LEAST 100 CIGARETTES IN YOUR ENTIRE LIFE: NO/DON'T KNOW
COPD SCREENING SCORE: 0
DO YOU EVER COUGH UP ANY MUCUS OR PHLEGM?: NO/ONLY WITH OCCASIONAL COLDS OR INFECTIONS

## 2020-11-22 ASSESSMENT — PAIN DESCRIPTION - PAIN TYPE
TYPE: SURGICAL PAIN

## 2020-11-22 ASSESSMENT — LIFESTYLE VARIABLES: SUBSTANCE_ABUSE: 0

## 2020-11-22 NOTE — PROGRESS NOTES
[facility] Medicine Daily Progress Note    Date of Service  11/22/2020    Chief Complaint  80 y.o. male admitted 11/20/2020 with complaints of suprapubic abdominal pain that has occurred intermittently over the past few months, and has been worsening, becoming unbearable over the past few days.     Hospital Course  complaints of suprapubic abdominal pain that has occurred intermittently over the past few months, and has been worsening, becoming unbearable over the past few days. He states that he lives in [place].  He was seen by a nurse practitioner there and they encouraged him to seek care at [facility].  He went to [facility] and they diagnosed him with a urinary tract infection.  Patient did not fill his medications yet though and came here to the ED for a second opinion.  Patient has history of abdominal ventral hernia surgery in the past x2 with residual hernia.  He notes that he had an operation by Dr. Suero in approximately 2014.  He notes that over the past 2 days he has noted that he has been unable to reduce one of his hernias.  Had episodes of vomiting as recently as 2 days ago.  Has had history of TURP as well.  He is followed by Dr. Tang from urology.     CT of abdomen performed in the ER shows:Right periumbilical hernia containing small bowel loops with associated proximal small bowel obstruction.  Enlarged prostate.  Patient is seen to have a white blood cell count of 108, likely secondary to CLL.  Urinalysis shows packed white blood cells and RBC greater than 150, proteinuria 100.   oncology Dr. Salazar is consulted in the ER and request patient to be scheduled to follow-up outpatient prior to discharge.  Dr. Galicia with general surgery is consulted in the ER and is taking patient for urgent surgery for incarcerated hernia. Exploratory laparotomy with repair of incarcerated ventral hernia. On 11/21/2020.    Interval Problem Update  Has not passing gas yet  He is aware of CLL since 2016; oncology suggested  outpatient follow-up.  Patient denies any symptom of weight change, anorexia,  fatigue, bleeding sign from anywhere; no sign of infection.    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   HENT: Negative for congestion and sore throat.    Eyes: Negative for discharge and redness.   Respiratory: Negative for cough, shortness of breath and wheezing.    Cardiovascular: Negative for chest pain, palpitations and leg swelling.   Gastrointestinal: Negative for abdominal pain, constipation, diarrhea, heartburn, nausea and vomiting.   Genitourinary: Negative for dysuria, frequency and urgency.   Musculoskeletal: Negative for back pain, falls and joint pain.   Skin: Negative for itching and rash.   Neurological: Negative for dizziness, tingling, sensory change, focal weakness and headaches.   Psychiatric/Behavioral: Negative for depression, hallucinations and substance abuse. The patient is not nervous/anxious and does not have insomnia.    All other systems reviewed and are negative.       Physical Exam  Temp:  [36.1 °C (97 °F)-37.2 °C (98.9 °F)] 36.2 °C (97.2 °F)  Pulse:  [69-90] 80  Resp:  [16-17] 16  BP: (107-124)/(66-73) 109/66  SpO2:  [90 %-95 %] 90 %    Physical Exam  Vitals signs and nursing note reviewed.   Constitutional:       General: He is not in acute distress.     Appearance: Normal appearance.   HENT:      Head: Normocephalic and atraumatic.      Nose: Nose normal. No congestion or rhinorrhea.      Mouth/Throat:      Pharynx: Oropharynx is clear. No posterior oropharyngeal erythema.   Eyes:      Extraocular Movements: Extraocular movements intact.      Conjunctiva/sclera: Conjunctivae normal.       Pupils: Pupils are equal, round, and reactive to light.   Neck:      Musculoskeletal: Normal range of motion and neck supple.      Vascular: No carotid bruit.   Cardiovascular:      Rate and Rhythm: Normal rate and regular rhythm.      Pulses: Normal pulses.      Heart sounds: Normal heart sounds.   Pulmonary:      Effort: Pulmonary effort is normal. No respiratory distress.      Breath sounds: Normal breath sounds. No wheezing.   Chest:      Chest wall: No tenderness.   Abdominal:      General: Abdomen is flat. Bowel sounds are normal. There is no distension.      Palpations: Abdomen is soft.      Tenderness: There is no abdominal tenderness. There is no guarding or rebound.      Comments: Dressing in the need abdomen, clean   Musculoskeletal: Normal range of motion.      Left lower leg: No edema.   Skin:     General: Skin is warm.   Neurological:      General: No focal deficit present.      Mental Status: He is alert and oriented to person, place, and time.      Cranial Nerves: No cranial nerve deficit.      Motor: No weakness.      Gait: Gait normal.      Deep Tendon Reflexes: Reflexes normal.   Psychiatric:         Mood and Affect: Mood normal.         Behavior: Behavior normal.         Judgment: Judgment normal.         Fluids    Intake/Output Summary (Last 24 hours) at 11/22/2020 0719  Last data filed at 11/21/2020 1546  Gross per 24 hour   Intake 0 ml   Output 1000 ml   Net -1000 ml       Laboratory  Recent Labs     11/20/20  1646 11/21/20  0452   .2* 80.4*   RBC 5.34 4.39*   HEMOGLOBIN 15.2 12.7*   HEMATOCRIT 48.1 39.9*   MCV 90.1 90.9   MCH 28.5 28.9   MCHC 31.6* 31.8*   RDW 48.7 48.5   PLATELETCT 214 149*   MPV 11.9 12.1     Recent Labs     11/20/20  1646 11/21/20  0452   SODIUM 135 139   POTASSIUM 5.0 4.6   CHLORIDE 101 105   CO2 23 24   GLUCOSE 165* 147*   BUN 13 14   CREATININE 1.19 1.10   CALCIUM 10.4 9.1                   Imaging  CT-ABDOMEN-PELVIS WITH   Final Result         1.  Right  periumbilical hernia containing small bowel loops with associated proximal small bowel obstruction.   2.  Enlarged heterogeneous prostate, consider causes a prostate enlargement including prostate malignancy.   3.  Left iliac adenopathy, consider causes of adenopathy including neoplastic disease.   4.  Irregular bladder wall thickening, further evaluation with cystoscopy for further characterization.   5.  Cholelithiasis   6.  Atherosclerosis      These findings were discussed with the patient's clinician, NIDHI CAVAZOS, on 11/20/2020 8:19 PM.           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 Salazar oncology consulted in the ER and will follow up outpatient    White blood cell 108.2 on admission    Enlarged prostate  Assessment & Plan  Seen on CT  S/p turp  Follows with Dr. Tang, Urology  Hx of CLL since 2014,   Urinating without difficulty currently    Incarcerated hernia- (present on admission)  Assessment & Plan  CT of abdomen performed in the ER shows:Right periumbilical hernia containing small bowel loops with associated proximal small bowel obstruction.    Taken for repair with Dr. Galicia urgently   Exploratory laparotomy with repair of incarcerated ventral hernia.   Pain management  NG tube decompression  Has not passing gas yet    UTI (urinary tract infection)  Assessment & Plan  Awaiting urine cultures  Rocephin day 3, total 5 days  IV fluids at 125 cc/h       VTE prophylaxis: Lovenox

## 2020-11-22 NOTE — PROGRESS NOTES
"    DATE: 11/22/2020    Post Operative Day  2 Exploratory laparotomy with repair of incarcerated ventral hernia.    Interval Events:  Status post OR, pain controlled this a.m., awaiting bowel function    PHYSICAL EXAMINATION:  Vital Signs: /66   Pulse 80   Temp 36.2 °C (97.2 °F) (Temporal)   Resp 16   Ht 1.6 m (5' 3\")   Wt 67.5 kg (148 lb 13 oz)   SpO2 90%     Soft, nontender, dressing in place.    Laboratory Values:   Recent Labs     11/20/20  1646 11/21/20  0452   .2* 80.4*   RBC 5.34 4.39*   HEMOGLOBIN 15.2 12.7*   HEMATOCRIT 48.1 39.9*   MCV 90.1 90.9   MCH 28.5 28.9   MCHC 31.6* 31.8*   RDW 48.7 48.5   PLATELETCT 214 149*   MPV 11.9 12.1     Recent Labs     11/20/20  1646 11/21/20  0452   SODIUM 135 139   POTASSIUM 5.0 4.6   CHLORIDE 101 105   CO2 23 24   GLUCOSE 165* 147*   BUN 13 14   CREATININE 1.19 1.10   CALCIUM 10.4 9.1     Recent Labs     11/20/20  1646 11/21/20  0452   ASTSGOT 16 12   ALTSGPT 10 6   TBILIRUBIN 0.8 0.3   ALKPHOSPHAT 132* 101*   GLOBULIN 3.0 2.5            Imaging:   CT-ABDOMEN-PELVIS WITH   Final Result         1.  Right periumbilical hernia containing small bowel loops with associated proximal small bowel obstruction.   2.  Enlarged heterogeneous prostate, consider causes a prostate enlargement including prostate malignancy.   3.  Left iliac adenopathy, consider causes of adenopathy including neoplastic disease.   4.  Irregular bladder wall thickening, further evaluation with cystoscopy for further characterization.   5.  Cholelithiasis   6.  Atherosclerosis      These findings were discussed with the patient's clinician, NIDHI CAVAZOS, on 11/20/2020 8:19 PM.          ASSESSMENT AND PLAN:     Incisional hernia- (present on admission)  Assessment & Plan  11/20 - ex lap on repair incarcerated ventral hernia  11/22 - awaiting bowel function    Recommend NG tube in place until bowel function, ambulation, appreciate primary medical team's management.       " ____________________________________     Issa Grijalva M.D.    DD: 11/21/2020  6:37 AM

## 2020-11-22 NOTE — THERAPY
Physical Therapy   Initial Evaluation     Patient Name: Wally Joel  Age:  80 y.o., Sex:  male  Medical Record #: [id number]  Today's Date: 11/21/2020     Precautions: Fall Risk, Other (See Comments)(s/p ventral hernia repair/SBO)    Assessment  Pt presents to PT s/p abdominal surgery. He was able to demonstrate hallway ambulation with no AD with Spv. He had 1 slight LOB though did self correct, though demonstrated occasional veering during activity (anticipate this may be in part 2' to first attempt ambulatory since surgery as well as limited nutritional intake). Anticipate he will self progress well though will visit to promote dc planning as needed. He should continue to mobilize with staff to promote dc plan to home as appropriate.      Plan    Recommend Physical Therapy 2 times per week until therapy goals are met for the following treatments:  Community Re-integration, Equipment, Manual Therapy, Self Care/Home Evaluation, Stair Training, Therapeutic Activities and Therapeutic Exercises    DC Equipment Recommendations: Unable to determine at this time, None  Discharge Recommendations: Anticipate that the patient will have no further physical therapy needs after discharge from the hospital          11/21/20 1641   Prior Living Situation   Prior Services None   Housing / Facility Mobile Home   Steps Into Home 1   Steps In Home 0   Equipment Owned None   Lives with - Patient's Self Care Capacity Alone and Able to Care For Self   Prior Level of Functional Mobility   Bed Mobility Independent   Transfer Status Independent   Ambulation Independent   Distance Ambulation (Feet)   (community)   Assistive Devices Used None   Stairs Independent   Comments I with IADls and ADls prior; pt reports biking as of last wednesday; appears relatively activve at baseline   Cognition    Cognition / Consciousness WDL   Comments very pleasant and cooperative   Passive ROM Lower Body   Passive ROM Lower Body WDL   Active ROM  Lower Body    Active ROM Lower Body  WDL   Strength Lower Body   Lower Body Strength  WDL   Sensation Lower Body   Lower Extremity Sensation   WDL   Strength Upper Body   Upper Body Strength  WDL   Balance Assessment   Sitting Balance (Static) Good   Sitting Balance (Dynamic) Good   Standing Balance (Static) Fair   Standing Balance (Dynamic) Fair -   Weight Shift Sitting Good   Weight Shift Standing Fair   Comments pt has 1 slight LOB upon initial attempt at ambulation; however, self corrects via stepping strategy and no further LOB;    Gait Analysis   Gait Level Of Assist   (CGA 2' to slight LOB)   Assistive Device None   Distance (Feet) 200   # of Times Distance was Traveled 1   Deviation Decreased Base Of Support;Other (Comment)  (reciprocal gait; mild veering to R )   # of Stairs Climbed 0   Weight Bearing Status no restrictions   Comments see balance   Bed Mobility    Supine to Sit Supervised   Sit to Supine Supervised   Scooting Supervised   Comments HOB flat   Functional Mobility   Sit to Stand Supervised   Bed, Chair, Wheelchair Transfer Supervised   Transfer Method Stand Step   Mobility with no AD   Activity Tolerance   Comments no overt/acute fatigue   Short Term Goals    Short Term Goal # 1 Pt will be able to ambulate 500ft with no AD with SPv in 6tx to promote dc to home plan   Short Term Goal # 2 pt will be able to ambulate up/down 3 steps with rail with Spv to promote increased access to community at time of dc to home   Education Group   Role of Physical Therapist Patient Response Patient;Acceptance;Explanation;Verbal Demonstration   Additional Comments education for increasing OOB activity to promote GI activity as well as promote dc planning to home

## 2020-11-22 NOTE — CARE PLAN
Problem: Venous Thromboembolism (VTW)/Deep Vein Thrombosis (DVT) Prevention:  Goal: Patient will participate in Venous Thrombosis (VTE)/Deep Vein Thrombosis (DVT)Prevention Measures  Outcome: PROGRESSING AS EXPECTED  Note: SCDs in place     Problem: Pain Management  Goal: Pain level will decrease to patient's comfort goal  Outcome: PROGRESSING AS EXPECTED

## 2020-11-23 PROBLEM — B95.8 BACTEREMIA DUE TO STAPHYLOCOCCUS: Status: ACTIVE | Noted: 2020-11-23

## 2020-11-23 PROBLEM — R78.81 BACTEREMIA DUE TO STAPHYLOCOCCUS: Status: ACTIVE | Noted: 2020-11-23

## 2020-11-23 LAB
ALBUMIN SERPL BCP-MCNC: 3.1 G/DL (ref 3.2–4.9)
ALBUMIN/GLOB SERPL: 1.2 G/DL
ALP SERPL-CCNC: 95 U/L (ref 30–99)
ALT SERPL-CCNC: 7 U/L (ref 2–50)
ANION GAP SERPL CALC-SCNC: 10 MMOL/L (ref 7–16)
ANISOCYTOSIS BLD QL SMEAR: ABNORMAL
AST SERPL-CCNC: 13 U/L (ref 12–45)
BACTERIA UR CULT: NORMAL
BASOPHILS # BLD AUTO: 0.8 % (ref 0–1.8)
BASOPHILS # BLD: 0.63 K/UL (ref 0–0.12)
BILIRUB SERPL-MCNC: 0.6 MG/DL (ref 0.1–1.5)
BUN SERPL-MCNC: 13 MG/DL (ref 8–22)
CALCIUM SERPL-MCNC: 8.7 MG/DL (ref 8.5–10.5)
CHLORIDE SERPL-SCNC: 100 MMOL/L (ref 96–112)
CO2 SERPL-SCNC: 26 MMOL/L (ref 20–33)
CREAT SERPL-MCNC: 1.02 MG/DL (ref 0.5–1.4)
EOSINOPHIL # BLD AUTO: 0 K/UL (ref 0–0.51)
EOSINOPHIL NFR BLD: 0 % (ref 0–6.9)
ERYTHROCYTE [DISTWIDTH] IN BLOOD BY AUTOMATED COUNT: 48.5 FL (ref 35.9–50)
GLOBULIN SER CALC-MCNC: 2.5 G/DL (ref 1.9–3.5)
GLUCOSE SERPL-MCNC: 98 MG/DL (ref 65–99)
HCT VFR BLD AUTO: 39.3 % (ref 42–52)
HGB BLD-MCNC: 12.3 G/DL (ref 14–18)
LYMPHOCYTES # BLD AUTO: 70.64 K/UL (ref 1–4.8)
LYMPHOCYTES NFR BLD: 90.1 % (ref 22–41)
MAGNESIUM SERPL-MCNC: 1.8 MG/DL (ref 1.5–2.5)
MANUAL DIFF BLD: NORMAL
MCH RBC QN AUTO: 28.8 PG (ref 27–33)
MCHC RBC AUTO-ENTMCNC: 31.3 G/DL (ref 33.7–35.3)
MCV RBC AUTO: 92 FL (ref 81.4–97.8)
MICROCYTES BLD QL SMEAR: ABNORMAL
MONOCYTES # BLD AUTO: 0.63 K/UL (ref 0–0.85)
MONOCYTES NFR BLD AUTO: 0.8 % (ref 0–13.4)
MORPHOLOGY BLD-IMP: NORMAL
NEUTROPHILS # BLD AUTO: 6.51 K/UL (ref 1.82–7.42)
NEUTROPHILS NFR BLD: 8.3 % (ref 44–72)
NRBC # BLD AUTO: 0 K/UL
NRBC BLD-RTO: 0 /100 WBC
PHOSPHATE SERPL-MCNC: 3.1 MG/DL (ref 2.5–4.5)
PLATELET # BLD AUTO: 144 K/UL (ref 164–446)
PLATELET BLD QL SMEAR: NORMAL
PMV BLD AUTO: 11.9 FL (ref 9–12.9)
POTASSIUM SERPL-SCNC: 3.9 MMOL/L (ref 3.6–5.5)
PROT SERPL-MCNC: 5.6 G/DL (ref 6–8.2)
RBC # BLD AUTO: 4.27 M/UL (ref 4.7–6.1)
RBC BLD AUTO: PRESENT
SIGNIFICANT IND 70042: NORMAL
SITE SITE: NORMAL
SMUDGE CELLS BLD QL SMEAR: NORMAL
SODIUM SERPL-SCNC: 136 MMOL/L (ref 135–145)
SOURCE SOURCE: NORMAL
WBC # BLD AUTO: 78.4 K/UL (ref 4.8–10.8)

## 2020-11-23 PROCEDURE — 700102 HCHG RX REV CODE 250 W/ 637 OVERRIDE(OP): Performed by: SURGERY

## 2020-11-23 PROCEDURE — 99232 SBSQ HOSP IP/OBS MODERATE 35: CPT | Performed by: STUDENT IN AN ORGANIZED HEALTH CARE EDUCATION/TRAINING PROGRAM

## 2020-11-23 PROCEDURE — 770006 HCHG ROOM/CARE - MED/SURG/GYN SEMI*

## 2020-11-23 PROCEDURE — 83735 ASSAY OF MAGNESIUM: CPT

## 2020-11-23 PROCEDURE — 97165 OT EVAL LOW COMPLEX 30 MIN: CPT

## 2020-11-23 PROCEDURE — A9270 NON-COVERED ITEM OR SERVICE: HCPCS | Performed by: SURGERY

## 2020-11-23 PROCEDURE — 700101 HCHG RX REV CODE 250: Performed by: SURGERY

## 2020-11-23 PROCEDURE — 85007 BL SMEAR W/DIFF WBC COUNT: CPT

## 2020-11-23 PROCEDURE — 36415 COLL VENOUS BLD VENIPUNCTURE: CPT

## 2020-11-23 PROCEDURE — 80053 COMPREHEN METABOLIC PANEL: CPT

## 2020-11-23 PROCEDURE — 700111 HCHG RX REV CODE 636 W/ 250 OVERRIDE (IP): Performed by: SURGERY

## 2020-11-23 PROCEDURE — 84100 ASSAY OF PHOSPHORUS: CPT

## 2020-11-23 PROCEDURE — 87040 BLOOD CULTURE FOR BACTERIA: CPT

## 2020-11-23 PROCEDURE — 85027 COMPLETE CBC AUTOMATED: CPT

## 2020-11-23 PROCEDURE — 700105 HCHG RX REV CODE 258: Performed by: SURGERY

## 2020-11-23 RX ADMIN — POLYETHYLENE GLYCOL 3350 1 PACKET: 17 POWDER, FOR SOLUTION ORAL at 16:50

## 2020-11-23 RX ADMIN — SODIUM CHLORIDE, SODIUM LACTATE, POTASSIUM CHLORIDE, CALCIUM CHLORIDE AND DEXTROSE MONOHYDRATE: 5; 600; 310; 30; 20 INJECTION, SOLUTION INTRAVENOUS at 18:17

## 2020-11-23 RX ADMIN — DOCUSATE SODIUM 50 MG AND SENNOSIDES 8.6 MG 1 TABLET: 8.6; 5 TABLET, FILM COATED ORAL at 21:42

## 2020-11-23 RX ADMIN — ENOXAPARIN SODIUM 40 MG: 40 INJECTION SUBCUTANEOUS at 04:10

## 2020-11-23 RX ADMIN — DOCUSATE SODIUM 100 MG: 100 CAPSULE, LIQUID FILLED ORAL at 16:50

## 2020-11-23 ASSESSMENT — ENCOUNTER SYMPTOMS
HALLUCINATIONS: 0
SHORTNESS OF BREATH: 0
EYE DISCHARGE: 0
FEVER: 0
WHEEZING: 0
INSOMNIA: 0
VOMITING: 0
PALPITATIONS: 0
BACK PAIN: 0
DIZZINESS: 0
SORE THROAT: 0
CHILLS: 0
TINGLING: 0
EYE REDNESS: 0
NERVOUS/ANXIOUS: 0
COUGH: 0
DEPRESSION: 0
ABDOMINAL PAIN: 0
FOCAL WEAKNESS: 0
HEARTBURN: 0
DIARRHEA: 0
FALLS: 0
CONSTIPATION: 0
SENSORY CHANGE: 0
NAUSEA: 0
HEADACHES: 0
WEIGHT LOSS: 0

## 2020-11-23 ASSESSMENT — PAIN DESCRIPTION - PAIN TYPE
TYPE: SURGICAL PAIN

## 2020-11-23 ASSESSMENT — COGNITIVE AND FUNCTIONAL STATUS - GENERAL
DAILY ACTIVITIY SCORE: 22
HELP NEEDED FOR BATHING: A LITTLE
SUGGESTED CMS G CODE MODIFIER DAILY ACTIVITY: CJ
DRESSING REGULAR LOWER BODY CLOTHING: A LITTLE

## 2020-11-23 ASSESSMENT — LIFESTYLE VARIABLES: SUBSTANCE_ABUSE: 0

## 2020-11-23 ASSESSMENT — ACTIVITIES OF DAILY LIVING (ADL): TOILETING: INDEPENDENT

## 2020-11-23 NOTE — PROGRESS NOTES
"    DATE: 11/23/2020    Post Operative Day  3 Exploratory laparotomy with repair of incarcerated ventral hernia.    Interval Events:  Doing well. Scant from NGT passing gas.    PHYSICAL EXAMINATION:  Vital Signs: /70   Pulse 71   Temp 37.3 °C (99.2 °F) (Temporal)   Resp 16   Ht 1.6 m (5' 3\")   Wt 67.5 kg (148 lb 13 oz)   SpO2 93%     Soft, nontender, dressing in place.    Laboratory Values:   Recent Labs     11/21/20 0452 11/22/20  1044 11/23/20  0424   WBC 80.4* 88.9* 78.4*   RBC 4.39* 4.31* 4.27*   HEMOGLOBIN 12.7* 12.2* 12.3*   HEMATOCRIT 39.9* 39.4* 39.3*   MCV 90.9 91.4 92.0   MCH 28.9 28.3 28.8   MCHC 31.8* 31.0* 31.3*   RDW 48.5 47.9 48.5   PLATELETCT 149* 153* 144*   MPV 12.1 11.6 11.9     Recent Labs     11/21/20 0452 11/22/20  1044 11/23/20  0424   SODIUM 139 134* 136   POTASSIUM 4.6 4.1 3.9   CHLORIDE 105 100 100   CO2 24 29 26   GLUCOSE 147* 135* 98   BUN 14 12 13   CREATININE 1.10 1.06 1.02   CALCIUM 9.1 8.8 8.7     Recent Labs     11/21/20 0452 11/22/20  1044 11/23/20  0424   ASTSGOT 12 13 13   ALTSGPT 6 6 7   TBILIRUBIN 0.3 0.5 0.6   ALKPHOSPHAT 101* 94 95   GLOBULIN 2.5 2.6 2.5            Imaging:   CT-ABDOMEN-PELVIS WITH   Final Result         1.  Right periumbilical hernia containing small bowel loops with associated proximal small bowel obstruction.   2.  Enlarged heterogeneous prostate, consider causes a prostate enlargement including prostate malignancy.   3.  Left iliac adenopathy, consider causes of adenopathy including neoplastic disease.   4.  Irregular bladder wall thickening, further evaluation with cystoscopy for further characterization.   5.  Cholelithiasis   6.  Atherosclerosis      These findings were discussed with the patient's clinician, NIDHI CAVAZOS, on 11/20/2020 8:19 PM.          ASSESSMENT AND PLAN:     Incisional hernia- (present on admission)  Assessment & Plan  11/20 - ex lap on repair incarcerated ventral hernia  11/22 - awaiting bowel function  11/23 - Passing " redd. DC NGT    Recommend dc NG tube and start clears.     Appreciate primary medical team's management.       ____________________________________     Pili Galicia M.D.    DD: 11/21/2020  6:37 AM

## 2020-11-23 NOTE — THERAPY
"Occupational Therapy   Initial Evaluation     Patient Name: Wally Joel  Age:  80 y.o., Sex:  male  Medical Record #: [id number]  Today's Date: 11/23/2020     Precautions  Precautions: (P) Fall Risk  Comments: (P) s/p ventral hernia repair/SBO    Assessment  Patient is 80 y.o. male who presents to acute s/p ex lap and repair of incarcerated ventral hernia. Pt appears close to functional baseline performing BADLs at SPV level. Pt reports following DC from acute he will stay with friends for a few nights prior to returning to [place]. No further acute OT needs noted at this time.     Plan    Recommend Occupational Therapy     DC Equipment Recommendations: (P) None  Discharge Recommendations: (P) Anticipate that the patient will have no further occupational therapy needs after discharge from the hospital     Subjective    \"I'm also a \"     Objective       11/23/20 1143   Total Time Spent   Total Time Spent (Mins) 30   Charge Group   OT Evaluation OT Evaluation Low   Prior Living Situation   Prior Services None   Housing / Facility Mobile Home   Bathroom Set up Walk In Shower;Bathtub / Shower Combination   Equipment Owned None   Lives with - Patient's Self Care Capacity Alone and Able to Care For Self   Comments Pt reports he will stay with friends in town for the first few days after DC. Then return to [facility]   Prior Level of ADL Function   Self Feeding Independent   Grooming / Hygiene Independent   Bathing Independent   Dressing Independent   Toileting Independent   Prior Level of IADL Function   Medication Management Independent   Laundry Independent   Kitchen Mobility Independent   Finances Independent   Home Management Independent   Shopping Independent   Prior Level Of Mobility Independent Without Device in Community;Independent Without Device in Home   Driving / Transportation Driving Independent   Comments Pt reports he is still an avid road biker    Precautions   Precautions Fall Risk "   Comments s/p ventral hernia repair/SBO   Vitals   O2 (LPM) 0   O2 Delivery Device None - Room Air   Pain 0 - 10 Group   Therapist Pain Assessment Post Activity Pain Same as Prior to Activity;Nurse Notified  (no c/o pain during session)   Cognition    Cognition / Consciousness WDL   Comments pleasent and cooperative   Active ROM Upper Body   Active ROM Upper Body  WDL   Strength Upper Body   Upper Body Strength  WDL   Coordination Upper Body   Coordination WDL   Balance Assessment   Sitting Balance (Static) Good   Sitting Balance (Dynamic) Good   Standing Balance (Static) Fair +   Standing Balance (Dynamic) Fair   Weight Shift Sitting Good   Weight Shift Standing Fair   Comments used IV pole to push, no LOB   Bed Mobility    Supine to Sit Supervised   Sit to Supine Supervised   Scooting Supervised   ADL Assessment   Grooming Supervision;Standing  (oral care at sink)   Upper Body Dressing Supervision  (doffed/donned gown)   Lower Body Dressing Supervision  (doffed/donned socks)   Toileting Supervision  (BM in toilet)   How much help from another person does the patient currently need...   Putting on and taking off regular lower body clothing? 3   Bathing (including washing, rinsing, and drying)? 3   Toileting, which includes using a toilet, bedpan, or urinal? 4   Putting on and taking off regular upper body clothing? 4   Taking care of personal grooming such as brushing teeth? 4   Eating meals? 4   6 Clicks Daily Activity Score 22   Functional Mobility   Sit to Stand Supervised   Bed, Chair, Wheelchair Transfer Supervised   Toilet Transfers Supervised   Transfer Method Stand Step   Mobility household distance, IV pole push   Activity Tolerance   Sitting in Chair 10+ min (up post)   Sitting Edge of Bed 6 min   Standing 10 min    Education Group   Role of Occupational Therapist Patient Response Patient;Acceptance;Explanation;Demonstration;Verbal Demonstration   Anticipated Discharge Equipment and Recommendations   DC  Equipment Recommendations None   Discharge Recommendations Anticipate that the patient will have no further occupational therapy needs after discharge from the hospital   Interdisciplinary Plan of Care Collaboration   IDT Collaboration with  Nursing   Patient Position at End of Therapy Seated;Call Light within Reach;Phone within Reach;Tray Table within Reach  (RN informed and aware of no chair alarm)   Collaboration Comments report given   Session Information   Date / Session Number  11/23, 1x only   Priority 0

## 2020-11-23 NOTE — DISCHARGE PLANNING
Anticipated Discharge Disposition: TBD - Likely Home    Action: per IDT rounds and chart review pt pending medical clearance. DC needs for Case Management unlikely at this time, will continue to follow.    Barriers to Discharge: Medical Clearance    Plan: f/u with medical team, pt

## 2020-11-23 NOTE — PROGRESS NOTES
[facility] Medicine Daily Progress Note    Date of Service  11/23/2020    Chief Complaint  80 y.o. male admitted 11/20/2020 with complaints of suprapubic abdominal pain that has occurred intermittently over the past few months, and has been worsening, becoming unbearable over the past few days.     Hospital Course  complaints of suprapubic abdominal pain that has occurred intermittently over the past few months, and has been worsening, becoming unbearable over the past few days. He states that he lives in [place].  He was seen by a nurse practitioner there and they encouraged him to seek care at [facility].  He went to [facility] and they diagnosed him with a urinary tract infection.  Patient did not fill his medications yet though and came here to the ED for a second opinion.  Patient has history of abdominal ventral hernia surgery in the past x2 with residual hernia.  He notes that he had an operation by Dr. Suero in approximately 2014.  He notes that over the past 2 days he has noted that he has been unable to reduce one of his hernias.  Had episodes of vomiting as recently as 2 days ago.  Has had history of TURP as well.  He is followed by Dr. Tang from urology.     CT of abdomen performed in the ER shows:Right periumbilical hernia containing small bowel loops with associated proximal small bowel obstruction.  Enlarged prostate.  Patient is seen to have a white blood cell count of 108, likely secondary to CLL.  Urinalysis shows packed white blood cells and RBC greater than 150, proteinuria 100.  Oncology Dr. Salazar is consulted in the ER and request patient to be scheduled to follow-up outpatient prior to discharge.  Patient is aware of CLL since 2016; oncology suggested outpatient follow-up.  Patient denies any symptom of weight change, anorexia,  fatigue, bleeding sign from anywhere; no sign of infection.    Dr. Galicia with general surgery is consulted in the ER and is taking patient for urgent surgery for incarcerated  hernia. S/p Exploratory laparotomy with repair of incarcerated ventral hernia. On 11/21/2020.     11/23: passing flatus and NG tube discontinued. Start clear liquid diet  Blood culture showed pos of Staphylococcus, negative for MRSA and S.aureus. No fever, normal LA ?containmination. Will hold on abx, repeat blood culture    Interval Problem Update  Patient was seen and examined at the bedside. No overnight events reported.  VS reviewed.   Patient is passing flatus, no nausea, vomiting, abdominal pain.  NG tube discontinued.  Liquid diet started    Consultants/Specialty  Surgery  oncology    Code Status  Full Code    Disposition  Likely home    Review of Systems  Review of Systems   Constitutional: Negative for chills, fever, malaise/fatigue and weight loss.   HENT: Negative for congestion and sore throat.    Eyes: Negative for discharge and redness.   Respiratory: Negative for cough, shortness of breath and wheezing.    Cardiovascular: Negative for chest pain, palpitations and leg swelling.   Gastrointestinal: Negative for abdominal pain, constipation, diarrhea, heartburn, nausea and vomiting.   Genitourinary: Negative for dysuria, frequency and urgency.   Musculoskeletal: Negative for back pain, falls and joint pain.   Skin: Negative for itching and rash.   Neurological: Negative for dizziness, tingling, sensory change, focal weakness and headaches.   Psychiatric/Behavioral: Negative for depression, hallucinations and substance abuse. The patient is not nervous/anxious and does not have insomnia.    All other systems reviewed and are negative.       Physical Exam  Temp:  [36.8 °C (98.2 °F)-37.6 °C (99.7 °F)] 37.3 °C (99.2 °F)  Pulse:  [71-81] 71  Resp:  [16] 16  BP: (113-126)/(70-76) 113/70  SpO2:  [91 %-93 %] 93 %    Physical Exam  Vitals signs and nursing note reviewed.   Constitutional:       General: He is not in acute distress.     Appearance: Normal appearance.   HENT:      Head: Normocephalic and  atraumatic.      Nose: Nose normal. No congestion or rhinorrhea.      Mouth/Throat:      Pharynx: Oropharynx is clear. No posterior oropharyngeal erythema.   Eyes:      Extraocular Movements: Extraocular movements intact.      Conjunctiva/sclera: Conjunctivae normal.      Pupils: Pupils are equal, round, and reactive to light.   Neck:      Musculoskeletal: Normal range of motion and neck supple.      Vascular: No carotid bruit.   Cardiovascular:      Rate and Rhythm: Normal rate and regular rhythm.      Pulses: Normal pulses.      Heart sounds: Normal heart sounds.   Pulmonary:      Effort: Pulmonary effort is normal. No respiratory distress.      Breath sounds: Normal breath sounds. No wheezing.   Chest:      Chest wall: No tenderness.   Abdominal:      General: Abdomen is flat. Bowel sounds are normal. There is no distension.      Palpations: Abdomen is soft.      Tenderness: There is no abdominal tenderness. There is no guarding or rebound.      Comments: Dressing in the abdomen noted, clean, no drainage   Musculoskeletal: Normal range of motion.      Left lower leg: No edema.   Skin:     General: Skin is warm and dry.   Neurological:      General: No focal deficit present.      Mental Status: He is alert and oriented to person, place, and time.      Cranial Nerves: No cranial nerve deficit.      Motor: No weakness.      Gait: Gait normal.      Deep Tendon Reflexes: Reflexes normal.   Psychiatric:         Mood and Affect: Mood normal.         Behavior: Behavior normal.         Judgment: Judgment normal.         Fluids    Intake/Output Summary (Last 24 hours) at 11/23/2020 1308  Last data filed at 11/23/2020 1045  Gross per 24 hour   Intake --   Output 250 ml   Net -250 ml       Laboratory  Recent Labs     11/21/20  0452 11/22/20  1044 11/23/20  0424   WBC 80.4* 88.9* 78.4*   RBC 4.39* 4.31* 4.27*   HEMOGLOBIN 12.7* 12.2* 12.3*   HEMATOCRIT 39.9* 39.4* 39.3*   MCV 90.9 91.4 92.0   MCH 28.9 28.3 28.8   MCHC 31.8*  31.0* 31.3*   RDW 48.5 47.9 48.5   PLATELETCT 149* 153* 144*   MPV 12.1 11.6 11.9     Recent Labs     11/21/20  0452 11/22/20  1044 11/23/20  0424   SODIUM 139 134* 136   POTASSIUM 4.6 4.1 3.9   CHLORIDE 105 100 100   CO2 24 29 26   GLUCOSE 147* 135* 98   BUN 14 12 13   CREATININE 1.10 1.06 1.02   CALCIUM 9.1 8.8 8.7             Recent Labs     11/22/20  1044   TRIGLYCERIDE 136   HDL 26*   LDL 69       Imaging  CT-ABDOMEN-PELVIS WITH   Final Result         1.  Right periumbilical hernia containing small bowel loops with associated proximal small bowel obstruction.   2.  Enlarged heterogeneous prostate, consider causes a prostate enlargement including prostate malignancy.   3.  Left iliac adenopathy, consider causes of adenopathy including neoplastic disease.   4.  Irregular bladder wall thickening, further evaluation with cystoscopy for further characterization.   5.  Cholelithiasis   6.  Atherosclerosis      These findings were discussed with the patient's clinician, NIDHI CAVAZOS, on 11/20/2020 8:19 PM.           Assessment/Plan  * Incarcerated hernia- (present on admission)  Assessment & Plan  CT of abdomen performed in the ER shows:Right periumbilical hernia containing small bowel loops with associated proximal small bowel obstruction.  S/p Exploratory laparotomy with repair of incarcerated ventral hernia 11/21  Pain management  NG tube decompression, discontinued 11/23  Start clear liquid diet 11/23.   IVF @75cc/hr, consider discontinue if patient tolerates diet.     Chronic lymphocytic leukemia (CLL), B-cell (HCC)- (present on admission)  Assessment & Plan  Patient is aware of CLL since 2016; Patient denies any symptom of weight change, anorexia,  fatigue, bleeding sign from anywhere; no sign of infection.  Dr. Salazar oncology consulted in the ER and will follow up outpatient.    White blood cell 108.2 on admission    Bacteremia due to Staphylococcus  Assessment & Plan  Blood culture showed pos of Staphylococcus,  negative for MRSA and S.aureus. No fever, normal LA. WBC chronic elevation due to CLL.  ?containmination. Will hold on abx, repeat blood culture    Enlarged prostate  Assessment & Plan  Seen on CT  S/p turp  Follows with Dr. Tang, Urology  Off roman, urinating without difficulty currently    UTI (urinary tract infection)  Assessment & Plan  Urine culture negative  Completed Rocephin 3 days course         VTE prophylaxis: Lovenox

## 2020-11-24 LAB
APPEARANCE UR: CLEAR
BACTERIA #/AREA URNS HPF: NEGATIVE /HPF
BACTERIA BLD CULT: ABNORMAL
BACTERIA BLD CULT: ABNORMAL
BILIRUB UR QL STRIP.AUTO: NEGATIVE
COLOR UR: YELLOW
EPI CELLS #/AREA URNS HPF: NEGATIVE /HPF
GLUCOSE UR STRIP.AUTO-MCNC: NEGATIVE MG/DL
HYALINE CASTS #/AREA URNS LPF: ABNORMAL /LPF
KETONES UR STRIP.AUTO-MCNC: NEGATIVE MG/DL
LEUKOCYTE ESTERASE UR QL STRIP.AUTO: ABNORMAL
MICRO URNS: ABNORMAL
NITRITE UR QL STRIP.AUTO: NEGATIVE
PH UR STRIP.AUTO: 7.5 [PH] (ref 5–8)
PROT UR QL STRIP: NEGATIVE MG/DL
RBC # URNS HPF: ABNORMAL /HPF
RBC UR QL AUTO: ABNORMAL
SIGNIFICANT IND 70042: ABNORMAL
SITE SITE: ABNORMAL
SOURCE SOURCE: ABNORMAL
SP GR UR STRIP.AUTO: 1.01
UROBILINOGEN UR STRIP.AUTO-MCNC: 0.2 MG/DL
WBC #/AREA URNS HPF: ABNORMAL /HPF

## 2020-11-24 PROCEDURE — 700102 HCHG RX REV CODE 250 W/ 637 OVERRIDE(OP): Performed by: SURGERY

## 2020-11-24 PROCEDURE — 87086 URINE CULTURE/COLONY COUNT: CPT

## 2020-11-24 PROCEDURE — 770006 HCHG ROOM/CARE - MED/SURG/GYN SEMI*

## 2020-11-24 PROCEDURE — 700111 HCHG RX REV CODE 636 W/ 250 OVERRIDE (IP): Performed by: SURGERY

## 2020-11-24 PROCEDURE — 700111 HCHG RX REV CODE 636 W/ 250 OVERRIDE (IP): Performed by: STUDENT IN AN ORGANIZED HEALTH CARE EDUCATION/TRAINING PROGRAM

## 2020-11-24 PROCEDURE — 97116 GAIT TRAINING THERAPY: CPT | Mod: CQ

## 2020-11-24 PROCEDURE — 99233 SBSQ HOSP IP/OBS HIGH 50: CPT | Performed by: STUDENT IN AN ORGANIZED HEALTH CARE EDUCATION/TRAINING PROGRAM

## 2020-11-24 PROCEDURE — 700101 HCHG RX REV CODE 250: Performed by: SURGERY

## 2020-11-24 PROCEDURE — A9270 NON-COVERED ITEM OR SERVICE: HCPCS | Performed by: SURGERY

## 2020-11-24 PROCEDURE — 81001 URINALYSIS AUTO W/SCOPE: CPT

## 2020-11-24 PROCEDURE — 700105 HCHG RX REV CODE 258: Performed by: SURGERY

## 2020-11-24 PROCEDURE — 700105 HCHG RX REV CODE 258: Performed by: STUDENT IN AN ORGANIZED HEALTH CARE EDUCATION/TRAINING PROGRAM

## 2020-11-24 PROCEDURE — 97530 THERAPEUTIC ACTIVITIES: CPT | Mod: CQ

## 2020-11-24 RX ADMIN — DOCUSATE SODIUM 100 MG: 100 CAPSULE, LIQUID FILLED ORAL at 04:42

## 2020-11-24 RX ADMIN — DOCUSATE SODIUM 50 MG AND SENNOSIDES 8.6 MG 1 TABLET: 8.6; 5 TABLET, FILM COATED ORAL at 22:01

## 2020-11-24 RX ADMIN — DOCUSATE SODIUM 100 MG: 100 CAPSULE, LIQUID FILLED ORAL at 17:26

## 2020-11-24 RX ADMIN — POLYETHYLENE GLYCOL 3350 1 PACKET: 17 POWDER, FOR SOLUTION ORAL at 17:26

## 2020-11-24 RX ADMIN — ENOXAPARIN SODIUM 40 MG: 40 INJECTION SUBCUTANEOUS at 04:42

## 2020-11-24 RX ADMIN — CEFTRIAXONE SODIUM 1 G: 1 INJECTION, POWDER, FOR SOLUTION INTRAMUSCULAR; INTRAVENOUS at 12:41

## 2020-11-24 RX ADMIN — MAGNESIUM HYDROXIDE 30 ML: 400 SUSPENSION ORAL at 04:42

## 2020-11-24 RX ADMIN — POLYETHYLENE GLYCOL 3350 1 PACKET: 17 POWDER, FOR SOLUTION ORAL at 04:42

## 2020-11-24 RX ADMIN — SODIUM CHLORIDE, SODIUM LACTATE, POTASSIUM CHLORIDE, CALCIUM CHLORIDE AND DEXTROSE MONOHYDRATE: 5; 600; 310; 30; 20 INJECTION, SOLUTION INTRAVENOUS at 04:49

## 2020-11-24 ASSESSMENT — ENCOUNTER SYMPTOMS
ABDOMINAL PAIN: 0
FEVER: 0
FOCAL WEAKNESS: 0
VOMITING: 0
NAUSEA: 0
HEADACHES: 0
EYES NEGATIVE: 1
MYALGIAS: 0
CHILLS: 0
COUGH: 0

## 2020-11-24 ASSESSMENT — GAIT ASSESSMENTS
GAIT LEVEL OF ASSIST: SUPERVISED
DISTANCE (FEET): 500
DEVIATION: NO DEVIATION

## 2020-11-24 ASSESSMENT — PAIN DESCRIPTION - PAIN TYPE
TYPE: SURGICAL PAIN

## 2020-11-24 ASSESSMENT — COGNITIVE AND FUNCTIONAL STATUS - GENERAL
MOBILITY SCORE: 23
SUGGESTED CMS G CODE MODIFIER MOBILITY: CI
CLIMB 3 TO 5 STEPS WITH RAILING: A LITTLE

## 2020-11-24 NOTE — ASSESSMENT & PLAN NOTE
Blood culture showed pos of Staphylococcus, negative for MRSA and S.aureus. No fever, normal LA. WBC chronic elevation due to CLL.  ?containmination. Will hold on abx, repeat blood culture

## 2020-11-24 NOTE — PROGRESS NOTES
Called Florin Sloan MD and notified her about blood culture results: positive for gram positive cocci, possible staph. Per MD Luther, will take a look.

## 2020-11-24 NOTE — CARE PLAN
Problem: Communication  Goal: The ability to communicate needs accurately and effectively will improve  Note: Educated patient to use call light to alert staff of needs. Patient verbalized understanding and calls approprietly.      Problem: Safety  Goal: Will remain free from injury  Outcome: PROGRESSING AS EXPECTED  Note: Fall precautions in place.  Patient able to reposition self independently. Patient able to ambulate to bathroom without use of assistive devices, stand by assist. Bed locked and placed in lowest position. Call light at the bedside. Personal belongings within reach. Non-skid socks on. SCDs in place. Patient comfortably resting in bed, hourly rounding ongoing.

## 2020-11-24 NOTE — PROGRESS NOTES
Hospital Medicine Daily Progress Note    Date of Service  11/24/2020    Chief Complaint  80 y.o. male admitted 11/20/2020 with suprapubic abdominal pain      Hospital Course  A 80-year-old man with h/o CLL, abdominal ventral hernia, s/p 2 surgeries with residual hernia presented with suprapubic abdominal pain, unable to reduce one of his hernias.   CT showed right periumbilical hernia containing small bowel loops with associated proximal small bowel obstruction.  Patient underwent exploratory laparotomy with repair of incarcerated ventral hernia on 11/20/2020.      Interval Problem Update  Patient hemodynamically stable, afebrile. Reports burning sensation, dark urine. Passing flatus, no bowel movements yet. Tolerating liquid diet, started regular diet    Consultants/Specialty  Surgery  Oncology    Code Status  Full Code    Disposition  TBD    Review of Systems  Review of Systems   Constitutional: Negative for chills and fever.   HENT: Negative.    Eyes: Negative.    Respiratory: Negative for cough.    Cardiovascular: Negative for chest pain.   Gastrointestinal: Negative for abdominal pain, nausea and vomiting.   Genitourinary: Negative for dysuria.   Musculoskeletal: Negative for myalgias.   Skin: Negative.    Neurological: Negative for focal weakness and headaches.        Physical Exam  Temp:  [36.4 °C (97.6 °F)-36.9 °C (98.5 °F)] 36.8 °C (98.2 °F)  Pulse:  [70-74] 72  Resp:  [16-17] 16  BP: (107-127)/(67-77) 116/77  SpO2:  [92 %-95 %] 92 %    Physical Exam  Vitals signs and nursing note reviewed.   Constitutional:       Appearance: Normal appearance.   HENT:      Head: Normocephalic.      Nose: Nose normal.      Mouth/Throat:      Mouth: Mucous membranes are moist.      Pharynx: Oropharynx is clear.   Eyes:      Pupils: Pupils are equal, round, and reactive to light.   Neck:      Musculoskeletal: Normal range of motion.   Cardiovascular:      Rate and Rhythm: Normal rate and regular rhythm.      Heart sounds:  Normal heart sounds.   Pulmonary:      Effort: Pulmonary effort is normal. No respiratory distress.      Breath sounds: Normal breath sounds.   Abdominal:      General: Abdomen is flat. Bowel sounds are normal. There is no distension.      Palpations: Abdomen is soft.      Tenderness: There is no abdominal tenderness.      Comments: Post-op wound with staples: no redness, no infiltrate, no tenderness   Musculoskeletal: Normal range of motion.   Skin:     General: Skin is warm.   Neurological:      General: No focal deficit present.      Mental Status: He is alert and oriented to person, place, and time.         Fluids    Intake/Output Summary (Last 24 hours) at 11/24/2020 1344  Last data filed at 11/24/2020 1200  Gross per 24 hour   Intake 1340 ml   Output 575 ml   Net 765 ml       Laboratory  Recent Labs     11/22/20  1044 11/23/20  0424   WBC 88.9* 78.4*   RBC 4.31* 4.27*   HEMOGLOBIN 12.2* 12.3*   HEMATOCRIT 39.4* 39.3*   MCV 91.4 92.0   MCH 28.3 28.8   MCHC 31.0* 31.3*   RDW 47.9 48.5   PLATELETCT 153* 144*   MPV 11.6 11.9     Recent Labs     11/22/20  1044 11/23/20  0424   SODIUM 134* 136   POTASSIUM 4.1 3.9   CHLORIDE 100 100   CO2 29 26   GLUCOSE 135* 98   BUN 12 13   CREATININE 1.06 1.02   CALCIUM 8.8 8.7             Recent Labs     11/22/20  1044   TRIGLYCERIDE 136   HDL 26*   LDL 69       Imaging  CT-ABDOMEN-PELVIS WITH   Final Result         1.  Right periumbilical hernia containing small bowel loops with associated proximal small bowel obstruction.   2.  Enlarged heterogeneous prostate, consider causes a prostate enlargement including prostate malignancy.   3.  Left iliac adenopathy, consider causes of adenopathy including neoplastic disease.   4.  Irregular bladder wall thickening, further evaluation with cystoscopy for further characterization.   5.  Cholelithiasis   6.  Atherosclerosis      These findings were discussed with the patient's clinician, NIDHI CAVAZOS, on 11/20/2020 8:19 PM.            Assessment/Plan  * Incarcerated hernia- (present on admission)  Assessment & Plan  CT of abdomen performed in the ER shows:Right periumbilical hernia containing small bowel loops with associated proximal small bowel obstruction.  S/p Exploratory laparotomy with repair of incarcerated ventral hernia 11/21  Regular diet    Chronic lymphocytic leukemia (CLL), B-cell (HCC)- (present on admission)  Assessment & Plan  Patient is aware of CLL since 2016; Patient denies any symptom of weight change, anorexia,  fatigue, bleeding sign from anywhere; no sign of infection  Dr. Salazar oncology consulted in the ER and will follow up outpatient  White blood cell 108.2 on admission  Monitor    Bacteremia due to Staphylococcus  Assessment & Plan  Blood culture showed pos of Staphylococcus, negative for MRSA and S.aureus. No fever, normal LA. WBC chronic elevation due to CLL.  ?containmination. Will hold on abx, repeat blood culture    Enlarged prostate  Assessment & Plan  Seen on CT  S/p TURP  Follows with Dr. Tang, Urology  Off roman, urinating without difficulty currently    UTI (urinary tract infection)  Assessment & Plan  Urinalysis positive for pyuria  Urine culture  Continue Ceftriaxone       VTE prophylaxis: lovenox

## 2020-11-24 NOTE — PROGRESS NOTES
"    DATE: 11/24/2020    Post Operative Day  4 Exploratory laparotomy with repair of incarcerated ventral hernia.    Interval Events:  Doing well. Tolerating clears. [place].    PHYSICAL EXAMINATION:  Vital Signs: /77   Pulse 72   Temp 36.8 °C (98.2 °F) (Temporal)   Resp 16   Ht 1.6 m (5' 3\")   Wt 67.5 kg (148 lb 13 oz)   SpO2 92%     Soft, nontender, dressing in place.    Laboratory Values:   Recent Labs     11/22/20  1044 11/23/20  0424   WBC 88.9* 78.4*   RBC 4.31* 4.27*   HEMOGLOBIN 12.2* 12.3*   HEMATOCRIT 39.4* 39.3*   MCV 91.4 92.0   MCH 28.3 28.8   MCHC 31.0* 31.3*   RDW 47.9 48.5   PLATELETCT 153* 144*   MPV 11.6 11.9     Recent Labs     11/22/20  1044 11/23/20  0424   SODIUM 134* 136   POTASSIUM 4.1 3.9   CHLORIDE 100 100   CO2 29 26   GLUCOSE 135* 98   BUN 12 13   CREATININE 1.06 1.02   CALCIUM 8.8 8.7     Recent Labs     11/22/20  1044 11/23/20  0424   ASTSGOT 13 13   ALTSGPT 6 7   TBILIRUBIN 0.5 0.6   ALKPHOSPHAT 94 95   GLOBULIN 2.6 2.5            Imaging:   CT-ABDOMEN-PELVIS WITH   Final Result         1.  Right periumbilical hernia containing small bowel loops with associated proximal small bowel obstruction.   2.  Enlarged heterogeneous prostate, consider causes a prostate enlargement including prostate malignancy.   3.  Left iliac adenopathy, consider causes of adenopathy including neoplastic disease.   4.  Irregular bladder wall thickening, further evaluation with cystoscopy for further characterization.   5.  Cholelithiasis   6.  Atherosclerosis      These findings were discussed with the patient's clinician, NIDHI CAVAZOS, on 11/20/2020 8:19 PM.          ASSESSMENT AND PLAN:     Incisional hernia- (present on admission)  Assessment & Plan  11/20 - ex lap on repair incarcerated ventral hernia  11/22 - awaiting bowel function  11/23 - Passing flatus. DC NGT    Recommend reg diet. DC soon     Appreciate primary medical team's management.       ____________________________________     " Pili Galicia M.D.    DD: 11/21/2020  6:37 AM

## 2020-11-24 NOTE — THERAPY
"Physical Therapy   Daily Treatment     Patient Name: Wally Joel  Age:  80 y.o., Sex:  male  Medical Record #: [id number]  Today's Date: 11/24/2020     Precautions: Fall Risk    Assessment    Pt progressing well w/ therapy. Pt able to perform all mobility w/out assist. He demonstrated good balance and activity tolerance. Pt ambulating 500 feet w/out an AD and performed dynamic tasks. Pt demonstrating appropriate righting reactions w/ standing balance exercises. He was able to perform stairs w/ one HR and no assist for entrance into home. Pt stating friend support upon DC home. He has met all therapy goals and no longer requires skilled acute therapy. Pt encouraged to continue ambulation w/ the nursing staff.    Plan    Discharge secondary to goals met.    DC Equipment Recommendations: None  Discharge Recommendations: Anticipate that the patient will have no further physical therapy needs after discharge from the hospital      Subjective    \"I was active before so I think that helped.\"     Objective       11/24/20 0959   Precautions   Precautions Fall Risk   Gait Analysis   Gait Level Of Assist Supervised   Assistive Device None   Distance (Feet) 500   # of Times Distance was Traveled 1   Deviation No deviation   # of Stairs Climbed 10   Level of Assist with Stairs Supervised   Comments Pt able to perform stairs w/ only one HR and reciprical gait pattern. Pt w/ no LOB during gait and able to perform dynamic tasks.   Bed Mobility    Comments NT up in chair pre/post. Appears to have strength to perform w/out assist.   Functional Mobility   Sit to Stand Supervised   Bed, Chair, Wheelchair Transfer Supervised   Transfer Method Other (Comments)  (Ambulating)   Mobility No AD   Short Term Goals    Short Term Goal # 1 Pt will be able to ambulate 500ft with no AD with SPv in 6tx to promote dc to home plan   Goal Outcome # 1 Goal met   Short Term Goal # 2 pt will be able to ambulate up/down 3 steps with rail with Spv " to promote increased access to community at time of dc to home   Goal Outcome # 2 Goal met

## 2020-11-24 NOTE — PROGRESS NOTES
0934: Rounded with Genesis Sahu MD at bedside. Notified him that pt is complaining of burning with urination and red tinged urine. Urinalysis ordered by MD Adelina.

## 2020-11-25 VITALS
BODY MASS INDEX: 26.37 KG/M2 | SYSTOLIC BLOOD PRESSURE: 109 MMHG | HEART RATE: 72 BPM | OXYGEN SATURATION: 93 % | DIASTOLIC BLOOD PRESSURE: 71 MMHG | WEIGHT: 148.81 LBS | TEMPERATURE: 99 F | RESPIRATION RATE: 16 BRPM | HEIGHT: 63 IN

## 2020-11-25 PROBLEM — B95.8 BACTEREMIA DUE TO STAPHYLOCOCCUS: Status: RESOLVED | Noted: 2020-11-23 | Resolved: 2020-11-25

## 2020-11-25 PROBLEM — K46.0 INCARCERATED HERNIA: Status: RESOLVED | Noted: 2020-11-20 | Resolved: 2020-11-25

## 2020-11-25 PROBLEM — R78.81 BACTEREMIA DUE TO STAPHYLOCOCCUS: Status: RESOLVED | Noted: 2020-11-23 | Resolved: 2020-11-25

## 2020-11-25 LAB
BASOPHILS # BLD AUTO: 0 % (ref 0–1.8)
BASOPHILS # BLD: 0 K/UL (ref 0–0.12)
EOSINOPHIL # BLD AUTO: 1.36 K/UL (ref 0–0.51)
EOSINOPHIL NFR BLD: 1.7 % (ref 0–6.9)
ERYTHROCYTE [DISTWIDTH] IN BLOOD BY AUTOMATED COUNT: 48.4 FL (ref 35.9–50)
HCT VFR BLD AUTO: 39.7 % (ref 42–52)
HGB BLD-MCNC: 12.5 G/DL (ref 14–18)
LYMPHOCYTES # BLD AUTO: 75.2 K/UL (ref 1–4.8)
LYMPHOCYTES NFR BLD: 94 % (ref 22–41)
MANUAL DIFF BLD: NORMAL
MCH RBC QN AUTO: 29.2 PG (ref 27–33)
MCHC RBC AUTO-ENTMCNC: 31.5 G/DL (ref 33.7–35.3)
MCV RBC AUTO: 92.8 FL (ref 81.4–97.8)
MONOCYTES # BLD AUTO: 0 K/UL (ref 0–0.85)
MONOCYTES NFR BLD AUTO: 0 % (ref 0–13.4)
MORPHOLOGY BLD-IMP: NORMAL
NEUTROPHILS # BLD AUTO: 3.44 K/UL (ref 1.82–7.42)
NEUTROPHILS NFR BLD: 4.3 % (ref 44–72)
NRBC # BLD AUTO: 0 K/UL
NRBC BLD-RTO: 0 /100 WBC
PLATELET # BLD AUTO: 149 K/UL (ref 164–446)
PLATELET BLD QL SMEAR: NORMAL
PMV BLD AUTO: 11.8 FL (ref 9–12.9)
RBC # BLD AUTO: 4.28 M/UL (ref 4.7–6.1)
WBC # BLD AUTO: 80 K/UL (ref 4.8–10.8)

## 2020-11-25 PROCEDURE — 700105 HCHG RX REV CODE 258: Performed by: STUDENT IN AN ORGANIZED HEALTH CARE EDUCATION/TRAINING PROGRAM

## 2020-11-25 PROCEDURE — A9270 NON-COVERED ITEM OR SERVICE: HCPCS | Performed by: SURGERY

## 2020-11-25 PROCEDURE — 700101 HCHG RX REV CODE 250: Performed by: SURGERY

## 2020-11-25 PROCEDURE — 85007 BL SMEAR W/DIFF WBC COUNT: CPT

## 2020-11-25 PROCEDURE — 700111 HCHG RX REV CODE 636 W/ 250 OVERRIDE (IP): Performed by: STUDENT IN AN ORGANIZED HEALTH CARE EDUCATION/TRAINING PROGRAM

## 2020-11-25 PROCEDURE — 85027 COMPLETE CBC AUTOMATED: CPT

## 2020-11-25 PROCEDURE — 700102 HCHG RX REV CODE 250 W/ 637 OVERRIDE(OP): Performed by: SURGERY

## 2020-11-25 PROCEDURE — 700111 HCHG RX REV CODE 636 W/ 250 OVERRIDE (IP): Performed by: SURGERY

## 2020-11-25 PROCEDURE — 99239 HOSP IP/OBS DSCHRG MGMT >30: CPT | Performed by: STUDENT IN AN ORGANIZED HEALTH CARE EDUCATION/TRAINING PROGRAM

## 2020-11-25 RX ORDER — CEFPODOXIME PROXETIL 100 MG/1
100 TABLET, FILM COATED ORAL 2 TIMES DAILY
Qty: 14 TAB | Refills: 0 | Status: SHIPPED | OUTPATIENT
Start: 2020-11-25 | End: 2020-11-25 | Stop reason: SDUPTHER

## 2020-11-25 RX ORDER — CEFPODOXIME PROXETIL 100 MG/1
100 TABLET, FILM COATED ORAL 2 TIMES DAILY
Qty: 14 TAB | Refills: 0 | Status: SHIPPED | OUTPATIENT
Start: 2020-11-25 | End: 2020-12-02

## 2020-11-25 RX ADMIN — POLYETHYLENE GLYCOL 3350 1 PACKET: 17 POWDER, FOR SOLUTION ORAL at 05:45

## 2020-11-25 RX ADMIN — CEFTRIAXONE SODIUM 1 G: 1 INJECTION, POWDER, FOR SOLUTION INTRAMUSCULAR; INTRAVENOUS at 05:46

## 2020-11-25 RX ADMIN — DOCUSATE SODIUM 100 MG: 100 CAPSULE, LIQUID FILLED ORAL at 05:45

## 2020-11-25 RX ADMIN — MAGNESIUM HYDROXIDE 30 ML: 400 SUSPENSION ORAL at 05:45

## 2020-11-25 RX ADMIN — ENOXAPARIN SODIUM 40 MG: 40 INJECTION SUBCUTANEOUS at 05:45

## 2020-11-25 NOTE — DISCHARGE INSTRUCTIONS
Discharge Instructions    Discharged to home by car with friend. Discharged via wheelchair, hospital escort: Yes.  Special equipment needed: Not Applicable    Be sure to schedule a follow-up appointment with your primary care doctor or any specialists as instructed.     Discharge Plan:   Diet Plan: Discussed  Activity Level: Discussed  Confirmed Follow up Appointment: Patient to Call and Schedule Appointment  Confirmed Symptoms Management: Discussed  Medication Reconciliation Updated: Yes  Influenza Vaccine Indication: Patient Refuses(Already received vaccine per pt.)    I understand that a diet low in cholesterol, fat, and sodium is recommended for good health. Unless I have been given specific instructions below for another diet, I accept this instruction as my diet prescription.   Other diet: Regular    Special Instructions: None    · Is patient discharged on Warfarin / Coumadin?   No     Depression / Suicide Risk    As you are discharged from this [facility] facility, it is important to learn how to keep safe from harming yourself.    Recognize the warning signs:  · Abrupt changes in personality, positive or negative- including increase in energy   · Giving away possessions  · Change in eating patterns- significant weight changes-  positive or negative  · Change in sleeping patterns- unable to sleep or sleeping all the time   · Unwillingness or inability to communicate  · Depression  · Unusual sadness, discouragement and loneliness  · Talk of wanting to die  · Neglect of personal appearance   · Rebelliousness- reckless behavior  · Withdrawal from people/activities they love  · Confusion- inability to concentrate     If you or a loved one observes any of these behaviors or has concerns about self-harm, here's what you can do:  · Talk about it- your feelings and reasons for harming yourself  · Remove any means that you might use to hurt yourself (examples: pills, rope, extension cords, firearm)  · Get  professional help from the community (Mental Health, Substance Abuse, psychological counseling)  · Do not be alone:Call your Safe Contact- someone whom you trust who will be there for you.  · Call your local CRISIS HOTLINE 380-8316 or 340-237-3651  · Call your local Children's Mobile Crisis Response Team [place] (503) 950-4867 or www.[employer]  · Call the toll free National Suicide Prevention Hotlines   · National Suicide Prevention Lifeline 048-584-ZZHW (1363)  · National Hope Line Network 800-SUICIDE (617-9946)      Exploratory Laparotomy, Adult, Care After  This sheet gives you information about how to care for yourself after your procedure. Your health care provider may also give you more specific instructions. If you have problems or questions, contact your health care provider.  What can I expect after the procedure?  After the procedure, it is common to have:  · Abdominal soreness.  · Fatigue.  · A sore throat from the tube in your throat.  · A lack of appetite.  Follow these instructions at home:  Medicines  · Take over-the-counter and prescription medicines only as told by your health care provider.  · If you were prescribed an antibiotic medicine, take it as told by your health care provider. Do not stop taking the antibiotic even if you start to feel better.  · Do not drive or operate heavy machinery while taking pain medicine.  · If you are taking prescription pain medicine, take actions to prevent or treat constipation. Your health care provider may recommend that you:  ? Drink enough fluid to keep your urine pale yellow.  ? Eat foods that are high in fiber, such as fresh fruits and vegetables, whole grains, and beans.  ? Limit foods that are high in fat and processed sugars, such as fried or sweet foods.  ? Take an over-the-counter or prescription medicine for constipation. Undergoing surgery and taking pain medicines can make constipation worse.  Incision care    · Follow instructions  from your health care provider about how to take care of your incision. Make sure you:  ? Wash your hands with soap and water before you change your bandage (dressing). If soap and water are not available, use hand .  ? Change your dressing as told by your health care provider.  ? Leave stitches (sutures), skin glue, or adhesive strips in place. These skin closures may need to stay in place for 2 weeks or longer. If adhesive strip edges start to loosen and curl up, you may trim the loose edges. Do not remove adhesive strips completely unless your health care provider tells you to do that.  · If you were sent home with a drain, follow instructions from your health care provider about how to care for it.  · Check your incision area every day for signs of infection. Check for:  ? Redness, swelling, or pain.  ? Fluid or blood.  ? Warmth.  ? Pus or a bad smell.  Activity    · Rest as told by your health care provider.  ? Avoid sitting for a long time without moving. Get up to take short walks every 1-2 hours. This is important to improve blood flow and breathing. Ask for help if you feel weak or unsteady.  · Do not lift anything that is heavier than 5 lb (2.2 kg), or the limit that your health care provider tells you, until he or she says that it is safe.  · Ask your health care provider when you can start to do your usual activities again, such as driving, going back to work, and having sex.  Eating and drinking  · You may eat what you usually eat. Include lots of whole grains, fruits, and vegetables in your diet. This will help to prevent constipation.  · Drink enough fluid to keep your urine pale yellow.  Bathing  · Keep your incision clean and dry. Clean it as often as told by your health care provider:  ? Gently wash the incision with soap and water.  ? Rinse the incision with water to remove all soap.  ? Pat the incision dry with a clean towel. Do not rub the incision.  · You may take showers after 48  hours.  · Do not take baths, swim, or use a hot tub until your health care provider says it is okay to do so.  General instructions  · Do not use any products that contain nicotine or tobacco, such as cigarettes and e-cigarettes. These can delay healing after surgery. If you need help quitting, ask your health care provider.  · Wear compression stockings as told by your health care provider. These stockings help to prevent blood clots and reduce swelling in your legs.  · Keep all follow-up visits as told by your health care provider. This is important.  Contact a health care provider if:  · You have a fever.  · You have chills.  · Your pain medicine is not helping.  · You have constipation or diarrhea.  · You have nausea or vomiting.  · You have drainage, redness, swelling, or pain at your incision site.  Get help right away if:  · Your pain is getting worse.  · You have not had a bowel movement for more than 3 days.  · You have ongoing (persistent) vomiting.  · The edges of your incision open up.  · You have warmth, tenderness, and swelling in your calf.  · You have trouble breathing.  · You have chest pain.  These symptoms may represent a serious problem that is an emergency. Do not wait to see if the symptoms will go away. Get medical help right away. Call your local emergency services (911 in the [place]). Do not drive yourself to the hospital.  Summary  · Abdominal soreness is common after exploratory laparotomy. Take over-the-counter and prescription pain medicines only as told by your health care provider.  · Follow instructions from your health care provider about how to take care of your incision. Do not take baths, swim, or use a hot tub until your health care provider says it is okay to do so.  · Watch for signs and symptoms of infection after surgery, including fever, chills, drainage from your incision, and worsening abdominal pain.  This information is not intended to replace advice given to you  by your health care provider. Make sure you discuss any questions you have with your health care provider.  Document Released: 08/01/2005 Document Revised: 02/10/2020 Document Reviewed: 12/28/2018  Elsevier Patient Education © 2020 [employer]

## 2020-11-25 NOTE — PROGRESS NOTES
"    DATE: 11/25/2020    Post Operative Day  5 Exploratory laparotomy with repair of incarcerated ventral hernia.    Interval Events:  Doing well. Tolerating reg diet.    PHYSICAL EXAMINATION:  Vital Signs: /71   Pulse 75   Temp 36.6 °C (97.8 °F) (Temporal)   Resp 16   Ht 1.6 m (5' 3\")   Wt 67.5 kg (148 lb 13 oz)   SpO2 94%     Soft, nontender, dressing in place.    Laboratory Values:   Recent Labs     11/22/20  1044 11/23/20  0424   WBC 88.9* 78.4*   RBC 4.31* 4.27*   HEMOGLOBIN 12.2* 12.3*   HEMATOCRIT 39.4* 39.3*   MCV 91.4 92.0   MCH 28.3 28.8   MCHC 31.0* 31.3*   RDW 47.9 48.5   PLATELETCT 153* 144*   MPV 11.6 11.9     Recent Labs     11/22/20  1044 11/23/20  0424   SODIUM 134* 136   POTASSIUM 4.1 3.9   CHLORIDE 100 100   CO2 29 26   GLUCOSE 135* 98   BUN 12 13   CREATININE 1.06 1.02   CALCIUM 8.8 8.7     Recent Labs     11/22/20  1044 11/23/20  0424   ASTSGOT 13 13   ALTSGPT 6 7   TBILIRUBIN 0.5 0.6   ALKPHOSPHAT 94 95   GLOBULIN 2.6 2.5            Imaging:   CT-ABDOMEN-PELVIS WITH   Final Result         1.  Right periumbilical hernia containing small bowel loops with associated proximal small bowel obstruction.   2.  Enlarged heterogeneous prostate, consider causes a prostate enlargement including prostate malignancy.   3.  Left iliac adenopathy, consider causes of adenopathy including neoplastic disease.   4.  Irregular bladder wall thickening, further evaluation with cystoscopy for further characterization.   5.  Cholelithiasis   6.  Atherosclerosis      These findings were discussed with the patient's clinician, NIDHI CAVAZOS, on 11/20/2020 8:19 PM.          ASSESSMENT AND PLAN:     Incisional hernia- (present on admission)  Assessment & Plan  11/20 - ex lap on repair incarcerated ventral hernia  11/22 - awaiting bowel function  11/23 - Passing flatus. DC NGT  11/24 - Adv diet      Recommend DC home     Appreciate primary medical team's management.       ____________________________________     " Pili Galicia M.D.    DD: 11/21/2020  6:37 AM

## 2020-11-25 NOTE — PROGRESS NOTES
1230- Patient arrived in [facility],     1240- patient plans to DC to [facility]. [facility] pharmacy updated. MD called to re-route RX.    1249- Spoke with pharmacy in [place], the will no fill the RX to allow for [facility] on [address] to fill.    1300- Patient discharged with friend Christine.

## 2020-11-25 NOTE — CARE PLAN
Problem: Communication  Goal: The ability to communicate needs accurately and effectively will improve  Outcome: PROGRESSING AS EXPECTED  Note: POC discussed, no further questions at this time. Educated to use call light, patient verbalized understanding.      Problem: Infection  Goal: Will remain free from infection  Note: Assess signs and symptoms of infection. Pt afebrile. Rocephin ordered q24hrs. No complaints of burning sensation when urinating. Urine yellow, clear, and odorless.

## 2020-11-25 NOTE — DISCHARGE SUMMARY
Discharge Summary    CHIEF COMPLAINT ON ADMISSION  Chief Complaint   Patient presents with   • Abdominal Pain     lower abd pain since yesterday; reports N/V, no diarrhea; pt recently rechecked for CA, reports his WBC ct is normal       Reason for Admission  ABD PAIN     Admission Date  11/20/2020    CODE STATUS  Full Code    HPI & HOSPITAL COURSE  A 80-year-old man with h/o CLL, abdominal ventral hernia, s/p 2 surgeries with residual hernia presented with suprapubic abdominal pain, unable to reduce one of his hernias.   CT showed right periumbilical hernia containing small bowel loops with associated proximal small bowel obstruction.  Patient underwent exploratory laparotomy with repair of incarcerated ventral hernia on 11/20/2020.  Patient is clinically stable.   Therefore, he is discharged in good and stable condition to home with close outpatient follow-up.    The patient met 2-midnight criteria for an inpatient stay at the time of discharge.    Discharge Date  11/25/2020    FOLLOW UP ITEMS POST DISCHARGE  Follow up with PCP within one week of discharge  Continue cefpodoxime 100 mg twice daily for 7 days for UTI  Follow up with oncology as outpatient for CLL  Follow up with urology as outpatient    DISCHARGE DIAGNOSES  Principal Problem (Resolved):    Incarcerated hernia POA: Yes  Active Problems:    Incisional hernia POA: Yes    Chronic lymphocytic leukemia (CLL), B-cell (HCC) POA: Yes    UTI (urinary tract infection) POA: Unknown    Enlarged prostate POA: Unknown  Resolved Problems:    Bacteremia due to Staphylococcus POA: Unknown      FOLLOW UP  No future appointments.  Pili Galicia M.D.  [address] [facility] [address]  [facility] [address]  [phone]    On 12/4/2020        MEDICATIONS ON DISCHARGE     Medication List      START taking these medications      Instructions   cefpodoxime 100 MG tablet  Commonly known as: VANTIN   Take 1 Tab by mouth 2 times a day for 7 days.  Dose: 100 mg        STOP taking these  medications    VITAMIN B 12 PO     VITAMIN C PO            Allergies  Allergies   Allergen Reactions   • Ciprofloxacin Unspecified     Lethargic.skin turned yellow, could not pee  [id number]       DIET  Orders Placed This Encounter   Procedures   • Diet Order Diet: Regular     Standing Status:   Standing     Number of Occurrences:   1     Order Specific Question:   Diet:     Answer:   Regular [1]       ACTIVITY  As tolerated.  Weight bearing as tolerated    CONSULTATIONS  General Surgery    PROCEDURES  11/20/2020 Exploratory celiotomy and primary repair of ventral hernia    LABORATORY  Lab Results   Component Value Date    SODIUM 136 11/23/2020    POTASSIUM 3.9 11/23/2020    CHLORIDE 100 11/23/2020    CO2 26 11/23/2020    GLUCOSE 98 11/23/2020    BUN 13 11/23/2020    CREATININE 1.02 11/23/2020        Lab Results   Component Value Date    WBC 78.4 (HH) 11/23/2020    HEMOGLOBIN 12.3 (L) 11/23/2020    HEMATOCRIT 39.3 (L) 11/23/2020    PLATELETCT 144 (L) 11/23/2020        Total time of the discharge process exceeds 35 minutes.

## 2020-11-26 LAB
BACTERIA BLD CULT: NORMAL
BACTERIA UR CULT: NORMAL
SIGNIFICANT IND 70042: NORMAL
SIGNIFICANT IND 70042: NORMAL
SITE SITE: NORMAL
SITE SITE: NORMAL
SOURCE SOURCE: NORMAL
SOURCE SOURCE: NORMAL

## 2020-11-29 LAB
BACTERIA BLD CULT: NORMAL
SIGNIFICANT IND 70042: NORMAL
SITE SITE: NORMAL
SOURCE SOURCE: NORMAL

## 2020-12-10 ENCOUNTER — NURSE TRIAGE (OUTPATIENT)
Dept: HEALTH INFORMATION MANAGEMENT | Facility: OTHER | Age: 80
End: 2020-12-10

## 2020-12-10 NOTE — TELEPHONE ENCOUNTER
Hernia repair on 11/21, spot on lower portion of your incision that is red & weeping.  Dr. Galicia was surgeon.  Not particulary painful.

## 2022-04-14 NOTE — ASSESSMENT & PLAN NOTE
11/20 - ex lap on repair incarcerated ventral hernia  11/22 - awaiting bowel function  11/23 - Passing flatus. DC NGT  11/24 - Adv diet     Detail Level: Detailed Was A Bandage Applied: Yes Punch Size In Mm: 4 Biopsy Type: H and E Anesthesia Type: 1% lidocaine with epinephrine Anesthesia Volume In Cc (Will Not Render If 0): 0.5 Additional Anesthesia Volume In Cc (Will Not Render If 0): 0 Hemostasis: None Epidermal Sutures: 4-0 Monocryl Wound Care: Petrolatum Dressing: bandage Suture Removal: 14 days Patient Will Remove Sutures At Home?: No Lab: 6 Lab Facility: 3 Consent: Written consent was obtained and risks were reviewed including but not limited to scarring, infection, bleeding, scabbing, incomplete removal, nerve damage and allergy to anesthesia. Post-Care Instructions: I reviewed with the patient in detail post-care instructions. Patient is to keep the biopsy site dry overnight, and then apply bacitracin twice daily until healed. Patient may apply hydrogen peroxide soaks to remove any crusting. Home Suture Removal Text: Patient was provided a home suture removal kit and will remove their sutures at home.  If they have any questions or difficulties they will call the office. Notification Instructions: Patient will be notified of biopsy results. However, patient instructed to call the office if not contacted within 2 weeks. Billing Type: Third-Party Bill Information: Selecting Yes will display possible errors in your note based on the variables you have selected. This validation is only offered as a suggestion for you. PLEASE NOTE THAT THE VALIDATION TEXT WILL BE REMOVED WHEN YOU FINALIZE YOUR NOTE. IF YOU WANT TO FAX A PRELIMINARY NOTE YOU WILL NEED TO TOGGLE THIS TO 'NO' IF YOU DO NOT WANT IT IN YOUR FAXED NOTE.

## 2022-10-04 ENCOUNTER — APPOINTMENT (RX ONLY)
Dept: URBAN - NONMETROPOLITAN AREA CLINIC 4 | Facility: CLINIC | Age: 82
Setting detail: DERMATOLOGY
End: 2022-10-04

## 2022-10-04 DIAGNOSIS — L81.4 OTHER MELANIN HYPERPIGMENTATION: ICD-10-CM

## 2022-10-04 DIAGNOSIS — L82.1 OTHER SEBORRHEIC KERATOSIS: ICD-10-CM

## 2022-10-04 DIAGNOSIS — D49.2 NEOPLASM OF UNSPECIFIED BEHAVIOR OF BONE, SOFT TISSUE, AND SKIN: ICD-10-CM

## 2022-10-04 DIAGNOSIS — L57.0 ACTINIC KERATOSIS: ICD-10-CM

## 2022-10-04 PROCEDURE — 99203 OFFICE O/P NEW LOW 30 MIN: CPT | Mod: 25

## 2022-10-04 PROCEDURE — ? LIQUID NITROGEN

## 2022-10-04 PROCEDURE — 11102 TANGNTL BX SKIN SINGLE LES: CPT | Mod: 59

## 2022-10-04 PROCEDURE — 17004 DESTROY PREMAL LESIONS 15/>: CPT

## 2022-10-04 PROCEDURE — 69100 BIOPSY OF EXTERNAL EAR: CPT

## 2022-10-04 PROCEDURE — ? BIOPSY BY SHAVE METHOD

## 2022-10-04 PROCEDURE — ? COUNSELING

## 2022-10-04 PROCEDURE — 11103 TANGNTL BX SKIN EA SEP/ADDL: CPT | Mod: 59

## 2022-10-04 ASSESSMENT — LOCATION DETAILED DESCRIPTION DERM
LOCATION DETAILED: LEFT VENTRAL DISTAL FOREARM
LOCATION DETAILED: RIGHT ANTERIOR DISTAL UPPER ARM
LOCATION DETAILED: LEFT SCAPHA
LOCATION DETAILED: LEFT CENTRAL MALAR CHEEK
LOCATION DETAILED: LEFT MEDIAL SUPERIOR CHEST
LOCATION DETAILED: RIGHT VENTRAL PROXIMAL FOREARM
LOCATION DETAILED: PERIUMBILICAL SKIN
LOCATION DETAILED: LEFT MEDIAL INFERIOR CHEST
LOCATION DETAILED: RIGHT MEDIAL INFERIOR CHEST
LOCATION DETAILED: LEFT ELBOW
LOCATION DETAILED: LEFT DISTAL POSTERIOR UPPER ARM
LOCATION DETAILED: LEFT ANTERIOR DISTAL UPPER ARM
LOCATION DETAILED: LEFT INFERIOR CENTRAL MALAR CHEEK
LOCATION DETAILED: RIGHT DISTAL POSTERIOR UPPER ARM
LOCATION DETAILED: LEFT MEDIAL UPPER BACK
LOCATION DETAILED: RIGHT ANTECUBITAL SKIN
LOCATION DETAILED: RIGHT VENTRAL DISTAL FOREARM
LOCATION DETAILED: HAIR
LOCATION DETAILED: RIGHT PROXIMAL DORSAL FOREARM

## 2022-10-04 ASSESSMENT — LOCATION SIMPLE DESCRIPTION DERM
LOCATION SIMPLE: RIGHT FOREARM
LOCATION SIMPLE: LEFT UPPER BACK
LOCATION SIMPLE: LEFT CHEEK
LOCATION SIMPLE: ABDOMEN
LOCATION SIMPLE: LEFT EAR
LOCATION SIMPLE: RIGHT UPPER ARM
LOCATION SIMPLE: LEFT ELBOW
LOCATION SIMPLE: CHEST
LOCATION SIMPLE: LEFT FOREARM
LOCATION SIMPLE: LEFT UPPER ARM
LOCATION SIMPLE: HAIR
LOCATION SIMPLE: RIGHT ELBOW

## 2022-10-04 ASSESSMENT — LOCATION ZONE DERM
LOCATION ZONE: ARM
LOCATION ZONE: TRUNK
LOCATION ZONE: EAR
LOCATION ZONE: SCALP
LOCATION ZONE: FACE

## 2022-10-04 ASSESSMENT — TOTAL NUMBER OF LESIONS: # OF LESIONS?: 18

## 2022-10-04 NOTE — PROCEDURE: LIQUID NITROGEN
Show Aperture Variable?: Yes
Application Tool (Optional): Liquid Nitrogen Sprayer
Number Of Freeze-Thaw Cycles: 3 freeze-thaw cycles
Render Note In Bullet Format When Appropriate: No
Duration Of Freeze Thaw-Cycle (Seconds): 3
Detail Level: Detailed
Post-Care Instructions: I reviewed with the patient in detail post-care instructions. Patient is to wear sunprotection, and avoid picking at any of the treated lesions. Pt may apply Vaseline to crusted or scabbing areas.
Consent: The patient's consent was obtained including but not limited to risks of crusting, scabbing, blistering, scarring, darker or lighter pigmentary change, recurrence, incomplete removal and infection.

## 2022-10-04 NOTE — PROCEDURE: BIOPSY BY SHAVE METHOD
Body Location Override (Optional - Billing Will Still Be Based On Selected Body Map Location If Applicable): left lateral cheek
Detail Level: Detailed
Depth Of Biopsy: dermis
Was A Bandage Applied: Yes
Size Of Lesion In Cm: 0
Biopsy Type: H and E
Biopsy Method: Personna blade
Anesthesia Type: 1% lidocaine with epinephrine
Anesthesia Volume In Cc (Will Not Render If 0): 3
Hemostasis: Electrocautery
Wound Care: Bacitracin
Dressing: bandage
Destruction After The Procedure: No
Type Of Destruction Used: Curettage
Curettage Text: The wound bed was treated with curettage after the biopsy was performed.
Cryotherapy Text: The wound bed was treated with cryotherapy after the biopsy was performed.
Electrodesiccation Text: The wound bed was treated with electrodesiccation after the biopsy was performed.
Electrodesiccation And Curettage Text: The wound bed was treated with electrodesiccation and curettage after the biopsy was performed.
Silver Nitrate Text: The wound bed was treated with silver nitrate after the biopsy was performed.
Lab: [id number]
Lab Facility: [address] [place] [address]
Path Notes (To The Dermatopathologist): Size: 2cm R/O: SCC
Consent: Written consent was obtained and risks were reviewed including but not limited to scarring, infection, bleeding, scabbing, incomplete removal, nerve damage and allergy to anesthesia.
Post-Care Instructions: I reviewed with the patient in detail post-care instructions. Patient is to keep the biopsy site dry overnight, and then apply bacitracin twice daily until healed. Patient may apply hydrogen peroxide soaks to remove any crusting.
Notification Instructions: Patient will be notified of biopsy results. However, patient instructed to call the office if not contacted within 2 weeks.
Billing Type: [employer]
Information: Selecting Yes will display possible errors in your note based on the variables you have selected. This validation is only offered as a suggestion for you. PLEASE NOTE THAT THE VALIDATION TEXT WILL BE REMOVED WHEN YOU FINALIZE YOUR NOTE. IF YOU WANT TO FAX A PRELIMINARY NOTE YOU WILL NEED TO TOGGLE THIS TO 'NO' IF YOU DO NOT WANT IT IN YOUR FAXED NOTE.
Body Location Override (Optional - Billing Will Still Be Based On Selected Body Map Location If Applicable): left helix
Lab: [id number]
Lab Facility: [id number]
Path Notes (To The Dermatopathologist): Size: 3cm R/O: SCC
Billing Type: Third-Party Bill
Body Location Override (Optional - Billing Will Still Be Based On Selected Body Map Location If Applicable): left chest
Body Location Override (Optional - Billing Will Still Be Based On Selected Body Map Location If Applicable): right mid chest
Path Notes (To The Dermatopathologist): Size:  3cm R/O: SCC
Body Location Override (Optional - Billing Will Still Be Based On Selected Body Map Location If Applicable): right mid radial forearm
Path Notes (To The Dermatopathologist): Size: 2.5cm R/O: SCC

## 2022-11-01 ENCOUNTER — APPOINTMENT (RX ONLY)
Dept: URBAN - NONMETROPOLITAN AREA CLINIC 4 | Facility: CLINIC | Age: 82
Setting detail: DERMATOLOGY
End: 2022-11-01

## 2022-11-01 DIAGNOSIS — D49.2 NEOPLASM OF UNSPECIFIED BEHAVIOR OF BONE, SOFT TISSUE, AND SKIN: ICD-10-CM

## 2022-11-01 PROBLEM — C44.329 SQUAMOUS CELL CARCINOMA OF SKIN OF OTHER PARTS OF FACE: Status: ACTIVE | Noted: 2022-11-01

## 2022-11-01 PROBLEM — D04.5 CARCINOMA IN SITU OF SKIN OF TRUNK: Status: ACTIVE | Noted: 2022-11-01

## 2022-11-01 PROBLEM — C44.622 SQUAMOUS CELL CARCINOMA OF SKIN OF RIGHT UPPER LIMB, INCLUDING SHOULDER: Status: ACTIVE | Noted: 2022-11-01

## 2022-11-01 PROBLEM — C44.229 SQUAMOUS CELL CARCINOMA OF SKIN OF LEFT EAR AND EXTERNAL AURICULAR CANAL: Status: ACTIVE | Noted: 2022-11-01

## 2022-11-01 PROCEDURE — ? CONSULTATION EXCISION

## 2022-11-01 PROCEDURE — ? BIOPSY BY SHAVE METHOD

## 2022-11-01 PROCEDURE — 11102 TANGNTL BX SKIN SINGLE LES: CPT | Mod: 59

## 2022-11-01 PROCEDURE — 11603 EXC TR-EXT MAL+MARG 2.1-3 CM: CPT

## 2022-11-01 PROCEDURE — ? EXCISION

## 2022-11-01 PROCEDURE — 13100 CMPLX RPR TRUNK 1.1-2.5 CM: CPT | Mod: 59

## 2022-11-01 PROCEDURE — ? PATHOLOGY DISCUSSION

## 2022-11-01 PROCEDURE — ? CONSULTATION FOR MOHS SURGERY

## 2022-11-01 PROCEDURE — ? COUNSELING

## 2022-11-01 PROCEDURE — 99213 OFFICE O/P EST LOW 20 MIN: CPT | Mod: 25

## 2022-11-01 ASSESSMENT — LOCATION SIMPLE DESCRIPTION DERM: LOCATION SIMPLE: LEFT CALF

## 2022-11-01 ASSESSMENT — LOCATION ZONE DERM: LOCATION ZONE: LEG

## 2022-11-01 ASSESSMENT — LOCATION DETAILED DESCRIPTION DERM: LOCATION DETAILED: LEFT PROXIMAL CALF

## 2022-11-01 NOTE — PROCEDURE: EXCISION
Body Location Override (Optional - Billing Will Still Be Based On Selected Body Map Location If Applicable): right mid chest
Surgeon (Optional): María Johansen
Surgeon Performing The Repair (Optional): NICK
Biopsy Photograph Reviewed: Yes
Date Of Previous Biopsy (Optional): 10/4/22
Size Of Lesion In Cm: 1.5
X Size Of Lesion In Cm (Optional): 1
Size Of Margin In Cm: 0.3
Anesthesia Volume In Cc: 7
Was An Eye Clamp Used?: No
Eye Clamp Note Details: An eye clamp was used during the procedure.
Excision Method: Elliptical
Saucerization Depth: dermis and superficial adipose tissue
Repair Type: Complex
Suturegard Retention Suture: 2-0 Nylon
Retention Suture Bite Size: 3 mm
Length To Time In Minutes Device Was In Place: 10
Intermediate / Complex Repair - Final Wound Length In Cm: 2
Distance Of Undermining In Cm (Required): 1.6
Undermining Type: Entire Wound
Debridement Text: The wound edges were debrided prior to proceeding with the closure to facilitate wound healing.
Helical Rim Text: The closure involved the helical rim.
Vermilion Border Text: The closure involved the vermilion border.
Nostril Rim Text: The closure involved the nostril rim.
Retention Suture Text: Retention sutures were placed to support the closure and prevent dehiscence.
Primary Defect Length (In Cm): 0
Suture Removal: 14 days
Lab: [address]
Lab Facility: [address] [place] [address]
Graft Donor Site Bandage (Optional-Leave Blank If You Don't Want In Note): Steri-strips and a pressure bandage were applied to the donor site.
Epidermal Closure Graft Donor Site (Optional): simple interrupted
Billing Type: [employer]
Excision Depth: adipose tissue
Scalpel Size: 15 blade
Anesthesia Type: 1% lidocaine with epinephrine
Additional Anesthesia Volume In Cc: 6
Hemostasis: Electrocautery
Estimated Blood Loss (Cc): minimal
Detail Level: Detailed
Anesthesia Volume In Cc: 5
Deep Sutures: 4-0 Polysorb
Dermal Closure: running subcuticular
Wound Care: Bacitracin
Dressing: pressure dressing
Suturegard Intro: Intraoperative tissue expansion was performed, utilizing the SUTUREGARD device, in order to reduce wound tension.
Suturegard Body: The suture ends were repeatedly re-tightened and re-clamped to achieve the desired tissue expansion.
Hemigard Intro: Due to skin fragility and wound tension, it was decided to use HEMIGARD adhesive retention suture devices to permit a linear closure. The skin was cleaned and dried for a 6cm distance away from the wound. Excessive hair, if present, was removed to allow for adhesion.
Hemigard Postcare Instructions: The HEMIGARD strips are to remain completely dry for at least 5-7 days.
Positioning (Leave Blank If You Do Not Want): The patient was placed in a comfortable position exposing the surgical site.
Pre-Excision Curettage Text (Leave Blank If You Do Not Want): Prior to drawing the surgical margin the visible lesion was removed with electrodesiccation and curettage to clearly define the lesion size.
Complex Repair Preamble Text (Leave Blank If You Do Not Want): Extensive wide undermining was performed.
Intermediate Repair Preamble Text (Leave Blank If You Do Not Want): Undermining was performed with blunt dissection.
Curvilinear Excision Additional Text (Leave Blank If You Do Not Want): The margin was drawn around the clinically apparent lesion. A curvilinear shape was then drawn on the skin incorporating the lesion and margins. Incisions were then made along these lines to the appropriate tissue plane and the lesion was extirpated.
Fusiform Excision Additional Text (Leave Blank If You Do Not Want): The margin was drawn around the clinically apparent lesion. A fusiform shape was then drawn on the skin incorporating the lesion and margins. Incisions were then made along these lines to the appropriate tissue plane and the lesion was extirpated.
Elliptical Excision Additional Text (Leave Blank If You Do Not Want): The margin was drawn around the clinically apparent lesion. An elliptical shape was then drawn on the skin incorporating the lesion and margins. Incisions were then made along these lines to the appropriate tissue plane and the lesion was extirpated.
Saucerization Excision Additional Text (Leave Blank If You Do Not Want): The margin was drawn around the clinically apparent lesion. Incisions were then made along these lines, in a tangential fashion, to the appropriate tissue plane and the lesion was extirpated.
Slit Excision Additional Text (Leave Blank If You Do Not Want): A linear line was drawn on the skin overlying the lesion. An incision was made slowly until the lesion was visualized. Once visualized, the lesion was removed with blunt dissection.
Excisional Biopsy Additional Text (Leave Blank If You Do Not Want): The margin was drawn around the clinically apparent lesion. An elliptical shape was then drawn on the skin incorporating the lesion and margins.  Incisions were then made along these lines to the appropriate tissue plane and the lesion was extirpated.
Perilesional Excision Additional Text (Leave Blank If You Do Not Want): The margin was drawn around the clinically apparent lesion. Incisions were then made along these lines to the appropriate tissue plane and the lesion was extirpated.
Repair Performed By Another Provider Text (Leave Blank If You Do Not Want): After the tissue was excised the defect was repaired by another provider.
No Repair - Repaired With Adjacent Surgical Defect Text (Leave Blank If You Do Not Want): After the excision the defect was repaired concurrently with another surgical defect which was in close approximation.
Adjacent Tissue Transfer Text: The defect edges were debeveled with a #15 scalpel blade. Given the location of the defect and the proximity to free margins an adjacent tissue transfer was deemed most appropriate. Using a sterile surgical marker, an appropriate flap was drawn incorporating the defect and placing the expected incisions within the relaxed skin tension lines where possible. The area thus outlined was incised deep to adipose tissue with a #15 scalpel blade. The skin margins were undermined to an appropriate distance in all directions utilizing iris scissors.
Advancement Flap (Single) Text: The defect edges were debeveled with a #15 scalpel blade. Given the location of the defect and the proximity to free margins a single advancement flap was deemed most appropriate. Using a sterile surgical marker, an appropriate advancement flap was drawn incorporating the defect and placing the expected incisions within the relaxed skin tension lines where possible. The area thus outlined was incised deep to adipose tissue with a #15 scalpel blade. The skin margins were undermined to an appropriate distance in all directions utilizing iris scissors.
Advancement Flap (Double) Text: The defect edges were debeveled with a #15 scalpel blade. Given the location of the defect and the proximity to free margins a double advancement flap was deemed most appropriate. Using a sterile surgical marker, the appropriate advancement flaps were drawn incorporating the defect and placing the expected incisions within the relaxed skin tension lines where possible. The area thus outlined was incised deep to adipose tissue with a #15 scalpel blade. The skin margins were undermined to an appropriate distance in all directions utilizing iris scissors.
Burow's Advancement Flap Text: The defect edges were debeveled with a #15 scalpel blade. Given the location of the defect and the proximity to free margins a Burow's advancement flap was deemed most appropriate. Using a sterile surgical marker, the appropriate advancement flap was drawn incorporating the defect and placing the expected incisions within the relaxed skin tension lines where possible. The area thus outlined was incised deep to adipose tissue with a #15 scalpel blade. The skin margins were undermined to an appropriate distance in all directions utilizing iris scissors.
Chonodrocutaneous Helical Advancement Flap Text: The defect edges were debeveled with a #15 scalpel blade. Given the location of the defect and the proximity to free margins a chondrocutaneous helical advancement flap was deemed most appropriate. Using a sterile surgical marker, the appropriate advancement flap was drawn incorporating the defect and placing the expected incisions within the relaxed skin tension lines where possible. The area thus outlined was incised deep to adipose tissue with a #15 scalpel blade. The skin margins were undermined to an appropriate distance in all directions utilizing iris scissors.
Crescentic Advancement Flap Text: The defect edges were debeveled with a #15 scalpel blade. Given the location of the defect and the proximity to free margins a crescentic advancement flap was deemed most appropriate. Using a sterile surgical marker, the appropriate advancement flap was drawn incorporating the defect and placing the expected incisions within the relaxed skin tension lines where possible. The area thus outlined was incised deep to adipose tissue with a #15 scalpel blade. The skin margins were undermined to an appropriate distance in all directions utilizing iris scissors.
A-T Advancement Flap Text: The defect edges were debeveled with a #15 scalpel blade. Given the location of the defect, shape of the defect and the proximity to free margins an A-T advancement flap was deemed most appropriate. Using a sterile surgical marker, an appropriate advancement flap was drawn incorporating the defect and placing the expected incisions within the relaxed skin tension lines where possible. The area thus outlined was incised deep to adipose tissue with a #15 scalpel blade. The skin margins were undermined to an appropriate distance in all directions utilizing iris scissors.
O-T Advancement Flap Text: The defect edges were debeveled with a #15 scalpel blade. Given the location of the defect, shape of the defect and the proximity to free margins an O-T advancement flap was deemed most appropriate. Using a sterile surgical marker, an appropriate advancement flap was drawn incorporating the defect and placing the expected incisions within the relaxed skin tension lines where possible. The area thus outlined was incised deep to adipose tissue with a #15 scalpel blade. The skin margins were undermined to an appropriate distance in all directions utilizing iris scissors.
O-L Flap Text: The defect edges were debeveled with a #15 scalpel blade. Given the location of the defect, shape of the defect and the proximity to free margins an O-L flap was deemed most appropriate. Using a sterile surgical marker, an appropriate advancement flap was drawn incorporating the defect and placing the expected incisions within the relaxed skin tension lines where possible. The area thus outlined was incised deep to adipose tissue with a #15 scalpel blade. The skin margins were undermined to an appropriate distance in all directions utilizing iris scissors.
O-Z Flap Text: The defect edges were debeveled with a #15 scalpel blade. Given the location of the defect, shape of the defect and the proximity to free margins an O-Z flap was deemed most appropriate. Using a sterile surgical marker, an appropriate transposition flap was drawn incorporating the defect and placing the expected incisions within the relaxed skin tension lines where possible. The area thus outlined was incised deep to adipose tissue with a #15 scalpel blade. The skin margins were undermined to an appropriate distance in all directions utilizing iris scissors.
Double O-Z Flap Text: The defect edges were debeveled with a #15 scalpel blade. Given the location of the defect, shape of the defect and the proximity to free margins a Double O-Z flap was deemed most appropriate. Using a sterile surgical marker, an appropriate transposition flap was drawn incorporating the defect and placing the expected incisions within the relaxed skin tension lines where possible. The area thus outlined was incised deep to adipose tissue with a #15 scalpel blade. The skin margins were undermined to an appropriate distance in all directions utilizing iris scissors.
V-Y Flap Text: The defect edges were debeveled with a #15 scalpel blade. Given the location of the defect, shape of the defect and the proximity to free margins a V-Y flap was deemed most appropriate. Using a sterile surgical marker, an appropriate advancement flap was drawn incorporating the defect and placing the expected incisions within the relaxed skin tension lines where possible. The area thus outlined was incised deep to adipose tissue with a #15 scalpel blade. The skin margins were undermined to an appropriate distance in all directions utilizing iris scissors.
Advancement-Rotation Flap Text: The defect edges were debeveled with a #15 scalpel blade. Given the location of the defect, shape of the defect and the proximity to free margins an advancement-rotation flap was deemed most appropriate. Using a sterile surgical marker, an appropriate flap was drawn incorporating the defect and placing the expected incisions within the relaxed skin tension lines where possible. The area thus outlined was incised deep to adipose tissue with a #15 scalpel blade. The skin margins were undermined to an appropriate distance in all directions utilizing iris scissors.
Mercedes Flap Text: The defect edges were debeveled with a #15 scalpel blade. Given the location of the defect, shape of the defect and the proximity to free margins a Mercedes flap was deemed most appropriate. Using a sterile surgical marker, an appropriate advancement flap was drawn incorporating the defect and placing the expected incisions within the relaxed skin tension lines where possible. The area thus outlined was incised deep to adipose tissue with a #15 scalpel blade. The skin margins were undermined to an appropriate distance in all directions utilizing iris scissors.
Modified Advancement Flap Text: The defect edges were debeveled with a #15 scalpel blade. Given the location of the defect, shape of the defect and the proximity to free margins a modified advancement flap was deemed most appropriate. Using a sterile surgical marker, an appropriate advancement flap was drawn incorporating the defect and placing the expected incisions within the relaxed skin tension lines where possible. The area thus outlined was incised deep to adipose tissue with a #15 scalpel blade. The skin margins were undermined to an appropriate distance in all directions utilizing iris scissors.
Mucosal Advancement Flap Text: Given the location of the defect, shape of the defect and the proximity to free margins a mucosal advancement flap was deemed most appropriate. Incisions were made with a 15 blade scalpel in the appropriate fashion along the cutaneous vermilion border and the mucosal lip. The remaining actinically damaged mucosal tissue was excised. The mucosal advancement flap was then elevated to the gingival sulcus with care taken to preserve the neurovascular structures and advanced into the primary defect. Care was taken to ensure that precise realignment of the vermilion border was achieved.
Peng Advancement Flap Text: The defect edges were debeveled with a #15 scalpel blade. Given the location of the defect, shape of the defect and the proximity to free margins a Peng advancement flap was deemed most appropriate. Using a sterile surgical marker, an appropriate advancement flap was drawn incorporating the defect and placing the expected incisions within the relaxed skin tension lines where possible. The area thus outlined was incised deep to adipose tissue with a #15 scalpel blade. The skin margins were undermined to an appropriate distance in all directions utilizing iris scissors.
Hatchet Flap Text: The defect edges were debeveled with a #15 scalpel blade. Given the location of the defect, shape of the defect and the proximity to free margins a hatchet flap was deemed most appropriate. Using a sterile surgical marker, an appropriate hatchet flap was drawn incorporating the defect and placing the expected incisions within the relaxed skin tension lines where possible. The area thus outlined was incised deep to adipose tissue with a #15 scalpel blade. The skin margins were undermined to an appropriate distance in all directions utilizing iris scissors.
Rotation Flap Text: The defect edges were debeveled with a #15 scalpel blade. Given the location of the defect, shape of the defect and the proximity to free margins a rotation flap was deemed most appropriate. Using a sterile surgical marker, an appropriate rotation flap was drawn incorporating the defect and placing the expected incisions within the relaxed skin tension lines where possible. The area thus outlined was incised deep to adipose tissue with a #15 scalpel blade. The skin margins were undermined to an appropriate distance in all directions utilizing iris scissors.
Spiral Flap Text: The defect edges were debeveled with a #15 scalpel blade. Given the location of the defect, shape of the defect and the proximity to free margins a spiral flap was deemed most appropriate. Using a sterile surgical marker, an appropriate rotation flap was drawn incorporating the defect and placing the expected incisions within the relaxed skin tension lines where possible. The area thus outlined was incised deep to adipose tissue with a #15 scalpel blade. The skin margins were undermined to an appropriate distance in all directions utilizing iris scissors.
Staged Advancement Flap Text: The defect edges were debeveled with a #15 scalpel blade. Given the location of the defect, shape of the defect and the proximity to free margins a staged advancement flap was deemed most appropriate. Using a sterile surgical marker, an appropriate advancement flap was drawn incorporating the defect and placing the expected incisions within the relaxed skin tension lines where possible. The area thus outlined was incised deep to adipose tissue with a #15 scalpel blade. The skin margins were undermined to an appropriate distance in all directions utilizing iris scissors.
Star Wedge Flap Text: The defect edges were debeveled with a #15 scalpel blade. Given the location of the defect, shape of the defect and the proximity to free margins a star wedge flap was deemed most appropriate. Using a sterile surgical marker, an appropriate rotation flap was drawn incorporating the defect and placing the expected incisions within the relaxed skin tension lines where possible. The area thus outlined was incised deep to adipose tissue with a #15 scalpel blade. The skin margins were undermined to an appropriate distance in all directions utilizing iris scissors.
Transposition Flap Text: The defect edges were debeveled with a #15 scalpel blade. Given the location of the defect and the proximity to free margins a transposition flap was deemed most appropriate. Using a sterile surgical marker, an appropriate transposition flap was drawn incorporating the defect. The area thus outlined was incised deep to adipose tissue with a #15 scalpel blade. The skin margins were undermined to an appropriate distance in all directions utilizing iris scissors.
Muscle Hinge Flap Text: The defect edges were debeveled with a #15 scalpel blade. Given the size, depth and location of the defect and the proximity to free margins a muscle hinge flap was deemed most appropriate. Using a sterile surgical marker, an appropriate hinge flap was drawn incorporating the defect. The area thus outlined was incised with a #15 scalpel blade. The skin margins were undermined to an appropriate distance in all directions utilizing iris scissors.
Mustarde Flap Text: The defect edges were debeveled with a #15 scalpel blade. Given the size, depth and location of the defect and the proximity to free margins a Mustarde flap was deemed most appropriate. Using a sterile surgical marker, an appropriate flap was drawn incorporating the defect. The area thus outlined was incised with a #15 scalpel blade. The skin margins were undermined to an appropriate distance in all directions utilizing iris scissors.
Nasal Turnover Hinge Flap Text: The defect edges were debeveled with a #15 scalpel blade. Given the size, depth, location of the defect and the defect being full thickness a nasal turnover hinge flap was deemed most appropriate. Using a sterile surgical marker, an appropriate hinge flap was drawn incorporating the defect. The area thus outlined was incised with a #15 scalpel blade. The flap was designed to recreate the nasal mucosal lining and the alar rim. The skin margins were undermined to an appropriate distance in all directions utilizing iris scissors.
Nasalis-Muscle-Based Myocutaneous Island Pedicle Flap Text: Using a #15 blade, an incision was made around the donor flap to the level of the nasalis muscle. Wide lateral undermining was then performed in both the subcutaneous plane above the nasalis muscle, and in a submuscular plane just above periosteum. This allowed the formation of a free nasalis muscle axial pedicle (based on the angular artery) which was still attached to the actual cutaneous flap, increasing its mobility and vascular viability. Hemostasis was obtained with pinpoint electrocoagulation. The flap was mobilized into position and the pivotal anchor points positioned and stabilized with buried interrupted sutures. Subcutaneous and dermal tissues were closed in a multilayered fashion with sutures. Tissue redundancies were excised, and the epidermal edges were apposed without significant tension and sutured with sutures.
Orbicularis Oris Muscle Flap Text: The defect edges were debeveled with a #15 scalpel blade. Given that the defect affected the competency of the oral sphincter an orbicularis oris muscle flap was deemed most appropriate to restore this competency and normal muscle function. Using a sterile surgical marker, an appropriate flap was drawn incorporating the defect. The area thus outlined was incised with a #15 scalpel blade.
Melolabial Transposition Flap Text: The defect edges were debeveled with a #15 scalpel blade. Given the location of the defect and the proximity to free margins a melolabial flap was deemed most appropriate. Using a sterile surgical marker, an appropriate melolabial transposition flap was drawn incorporating the defect. The area thus outlined was incised deep to adipose tissue with a #15 scalpel blade. The skin margins were undermined to an appropriate distance in all directions utilizing iris scissors.
Rhombic Flap Text: The defect edges were debeveled with a #15 scalpel blade. Given the location of the defect and the proximity to free margins a rhombic flap was deemed most appropriate. Using a sterile surgical marker, an appropriate rhombic flap was drawn incorporating the defect. The area thus outlined was incised deep to adipose tissue with a #15 scalpel blade. The skin margins were undermined to an appropriate distance in all directions utilizing iris scissors.
Rhomboid Transposition Flap Text: The defect edges were debeveled with a #15 scalpel blade. Given the location of the defect and the proximity to free margins a rhomboid transposition flap was deemed most appropriate. Using a sterile surgical marker, an appropriate rhomboid flap was drawn incorporating the defect. The area thus outlined was incised deep to adipose tissue with a #15 scalpel blade. The skin margins were undermined to an appropriate distance in all directions utilizing iris scissors.
Bi-Rhombic Flap Text: The defect edges were debeveled with a #15 scalpel blade. Given the location of the defect and the proximity to free margins a bi-rhombic flap was deemed most appropriate. Using a sterile surgical marker, an appropriate rhombic flap was drawn incorporating the defect. The area thus outlined was incised deep to adipose tissue with a #15 scalpel blade. The skin margins were undermined to an appropriate distance in all directions utilizing iris scissors.
Helical Rim Advancement Flap Text: The defect edges were debeveled with a #15 blade scalpel. Given the location of the defect and the proximity to free margins (helical rim) a double helical rim advancement flap was deemed most appropriate. Using a sterile surgical marker, the appropriate advancement flaps were drawn incorporating the defect and placing the expected incisions between the helical rim and antihelix where possible. The area thus outlined was incised through and through with a #15 scalpel blade. With a skin hook and iris scissors, the flaps were gently and sharply undermined and freed up.
Bilateral Helical Rim Advancement Flap Text: The defect edges were debeveled with a #15 blade scalpel. Given the location of the defect and the proximity to free margins (helical rim) a bilateral helical rim advancement flap was deemed most appropriate. Using a sterile surgical marker, the appropriate advancement flaps were drawn incorporating the defect and placing the expected incisions between the helical rim and antihelix where possible. The area thus outlined was incised through and through with a #15 scalpel blade. With a skin hook and iris scissors, the flaps were gently and sharply undermined and freed up.
Ear Star Wedge Flap Text: The defect edges were debeveled with a #15 blade scalpel. Given the location of the defect and the proximity to free margins (helical rim) an ear star wedge flap was deemed most appropriate. Using a sterile surgical marker, the appropriate flap was drawn incorporating the defect and placing the expected incisions between the helical rim and antihelix where possible. The area thus outlined was incised through and through with a #15 scalpel blade.
Banner Transposition Flap Text: The defect edges were debeveled with a #15 scalpel blade. Given the location of the defect and the proximity to free margins a Banner transposition flap was deemed most appropriate. Using a sterile surgical marker, an appropriate flap drawn around the defect. The area thus outlined was incised deep to adipose tissue with a #15 scalpel blade. The skin margins were undermined to an appropriate distance in all directions utilizing iris scissors.
Bilobed Flap Text: The defect edges were debeveled with a #15 scalpel blade. Given the location of the defect and the proximity to free margins a bilobe flap was deemed most appropriate. Using a sterile surgical marker, an appropriate bilobe flap drawn around the defect. The area thus outlined was incised deep to adipose tissue with a #15 scalpel blade. The skin margins were undermined to an appropriate distance in all directions utilizing iris scissors.
Bilobed Transposition Flap Text: The defect edges were debeveled with a #15 scalpel blade. Given the location of the defect and the proximity to free margins a bilobed transposition flap was deemed most appropriate. Using a sterile surgical marker, an appropriate bilobe flap drawn around the defect. The area thus outlined was incised deep to adipose tissue with a #15 scalpel blade. The skin margins were undermined to an appropriate distance in all directions utilizing iris scissors.
Trilobed Flap Text: The defect edges were debeveled with a #15 scalpel blade. Given the location of the defect and the proximity to free margins a trilobed flap was deemed most appropriate. Using a sterile surgical marker, an appropriate trilobed flap drawn around the defect. The area thus outlined was incised deep to adipose tissue with a #15 scalpel blade. The skin margins were undermined to an appropriate distance in all directions utilizing iris scissors.
Dorsal Nasal Flap Text: The defect edges were debeveled with a #15 scalpel blade. Given the location of the defect and the proximity to free margins a dorsal nasal flap was deemed most appropriate. Using a sterile surgical marker, an appropriate dorsal nasal flap was drawn around the defect. The area thus outlined was incised deep to adipose tissue with a #15 scalpel blade. The skin margins were undermined to an appropriate distance in all directions utilizing iris scissors.
Island Pedicle Flap Text: The defect edges were debeveled with a #15 scalpel blade. Given the location of the defect, shape of the defect and the proximity to free margins an island pedicle advancement flap was deemed most appropriate. Using a sterile surgical marker, an appropriate advancement flap was drawn incorporating the defect, outlining the appropriate donor tissue and placing the expected incisions within the relaxed skin tension lines where possible. The area thus outlined was incised deep to adipose tissue with a #15 scalpel blade. The skin margins were undermined to an appropriate distance in all directions around the primary defect and laterally outward around the island pedicle utilizing iris scissors. There was minimal undermining beneath the pedicle flap.
Island Pedicle Flap With Canthal Suspension Text: The defect edges were debeveled with a #15 scalpel blade. Given the location of the defect, shape of the defect and the proximity to free margins an island pedicle advancement flap was deemed most appropriate. Using a sterile surgical marker, an appropriate advancement flap was drawn incorporating the defect, outlining the appropriate donor tissue and placing the expected incisions within the relaxed skin tension lines where possible. The area thus outlined was incised deep to adipose tissue with a #15 scalpel blade. The skin margins were undermined to an appropriate distance in all directions around the primary defect and laterally outward around the island pedicle utilizing iris scissors. There was minimal undermining beneath the pedicle flap. A suspension suture was placed in the canthal tendon to prevent tension and prevent ectropion.
Alar Island Pedicle Flap Text: The defect edges were debeveled with a #15 scalpel blade. Given the location of the defect, shape of the defect and the proximity to the alar rim an island pedicle advancement flap was deemed most appropriate. Using a sterile surgical marker, an appropriate advancement flap was drawn incorporating the defect, outlining the appropriate donor tissue and placing the expected incisions within the nasal ala running parallel to the alar rim. The area thus outlined was incised with a #15 scalpel blade. The skin margins were undermined minimally to an appropriate distance in all directions around the primary defect and laterally outward around the island pedicle utilizing iris scissors. There was minimal undermining beneath the pedicle flap.
Double Island Pedicle Flap Text: The defect edges were debeveled with a #15 scalpel blade. Given the location of the defect, shape of the defect and the proximity to free margins a double island pedicle advancement flap was deemed most appropriate. Using a sterile surgical marker, an appropriate advancement flap was drawn incorporating the defect, outlining the appropriate donor tissue and placing the expected incisions within the relaxed skin tension lines where possible. The area thus outlined was incised deep to adipose tissue with a #15 scalpel blade. The skin margins were undermined to an appropriate distance in all directions around the primary defect and laterally outward around the island pedicle utilizing iris scissors. There was minimal undermining beneath the pedicle flap.
Island Pedicle Flap-Requiring Vessel Identification Text: The defect edges were debeveled with a #15 scalpel blade. Given the location of the defect, shape of the defect and the proximity to free margins an island pedicle advancement flap was deemed most appropriate. Using a sterile surgical marker, an appropriate advancement flap was drawn, based on the axial vessel mentioned above, incorporating the defect, outlining the appropriate donor tissue and placing the expected incisions within the relaxed skin tension lines where possible. The area thus outlined was incised deep to adipose tissue with a #15 scalpel blade. The skin margins were undermined to an appropriate distance in all directions around the primary defect and laterally outward around the island pedicle utilizing iris scissors. There was minimal undermining beneath the pedicle flap.
Keystone Flap Text: The defect edges were debeveled with a #15 scalpel blade. Given the location of the defect, shape of the defect a keystone flap was deemed most appropriate. Using a sterile surgical marker, an appropriate keystone flap was drawn incorporating the defect, outlining the appropriate donor tissue and placing the expected incisions within the relaxed skin tension lines where possible. The area thus outlined was incised deep to adipose tissue with a #15 scalpel blade. The skin margins were undermined to an appropriate distance in all directions around the primary defect and laterally outward around the flap utilizing iris scissors.
O-T Plasty Text: The defect edges were debeveled with a #15 scalpel blade. Given the location of the defect, shape of the defect and the proximity to free margins an O-T plasty was deemed most appropriate. Using a sterile surgical marker, an appropriate O-T plasty was drawn incorporating the defect and placing the expected incisions within the relaxed skin tension lines where possible. The area thus outlined was incised deep to adipose tissue with a #15 scalpel blade. The skin margins were undermined to an appropriate distance in all directions utilizing iris scissors.
O-Z Plasty Text: The defect edges were debeveled with a #15 scalpel blade. Given the location of the defect, shape of the defect and the proximity to free margins an O-Z plasty (double transposition flap) was deemed most appropriate. Using a sterile surgical marker, the appropriate transposition flaps were drawn incorporating the defect and placing the expected incisions within the relaxed skin tension lines where possible. The area thus outlined was incised deep to adipose tissue with a #15 scalpel blade. The skin margins were undermined to an appropriate distance in all directions utilizing iris scissors. Hemostasis was achieved with electrocautery. The flaps were then transposed into place, one clockwise and the other counterclockwise, and anchored with interrupted buried subcutaneous sutures.
Double O-Z Plasty Text: The defect edges were debeveled with a #15 scalpel blade. Given the location of the defect, shape of the defect and the proximity to free margins a Double O-Z plasty (double transposition flap) was deemed most appropriate. Using a sterile surgical marker, the appropriate transposition flaps were drawn incorporating the defect and placing the expected incisions within the relaxed skin tension lines where possible. The area thus outlined was incised deep to adipose tissue with a #15 scalpel blade. The skin margins were undermined to an appropriate distance in all directions utilizing iris scissors. Hemostasis was achieved with electrocautery. The flaps were then transposed into place, one clockwise and the other counterclockwise, and anchored with interrupted buried subcutaneous sutures.
V-Y Plasty Text: The defect edges were debeveled with a #15 scalpel blade. Given the location of the defect, shape of the defect and the proximity to free margins an V-Y advancement flap was deemed most appropriate. Using a sterile surgical marker, an appropriate advancement flap was drawn incorporating the defect and placing the expected incisions within the relaxed skin tension lines where possible. The area thus outlined was incised deep to adipose tissue with a #15 scalpel blade. The skin margins were undermined to an appropriate distance in all directions utilizing iris scissors.
H Plasty Text: Given the location of the defect, shape of the defect and the proximity to free margins a H-plasty was deemed most appropriate for repair. Using a sterile surgical marker, the appropriate advancement arms of the H-plasty were drawn incorporating the defect and placing the expected incisions within the relaxed skin tension lines where possible. The area thus outlined was incised deep to adipose tissue with a #15 scalpel blade. The skin margins were undermined to an appropriate distance in all directions utilizing iris scissors. The opposing advancement arms were then advanced into place in opposite direction and anchored with interrupted buried subcutaneous sutures.
W Plasty Text: The lesion was extirpated to the level of the fat with a #15 scalpel blade. Given the location of the defect, shape of the defect and the proximity to free margins a W-plasty was deemed most appropriate for repair. Using a sterile surgical marker, the appropriate transposition arms of the W-plasty were drawn incorporating the defect and placing the expected incisions within the relaxed skin tension lines where possible. The area thus outlined was incised deep to adipose tissue with a #15 scalpel blade. The skin margins were undermined to an appropriate distance in all directions utilizing iris scissors. The opposing transposition arms were then transposed into place in opposite direction and anchored with interrupted buried subcutaneous sutures.
Z Plasty Text: The lesion was extirpated to the level of the fat with a #15 scalpel blade. Given the location of the defect, shape of the defect and the proximity to free margins a Z-plasty was deemed most appropriate for repair. Using a sterile surgical marker, the appropriate transposition arms of the Z-plasty were drawn incorporating the defect and placing the expected incisions within the relaxed skin tension lines where possible. The area thus outlined was incised deep to adipose tissue with a #15 scalpel blade. The skin margins were undermined to an appropriate distance in all directions utilizing iris scissors. The opposing transposition arms were then transposed into place in opposite direction and anchored with interrupted buried subcutaneous sutures.
Zygomaticofacial Flap Text: Given the location of the defect, shape of the defect and the proximity to free margins a zygomaticofacial flap was deemed most appropriate for repair. Using a sterile surgical marker, the appropriate flap was drawn incorporating the defect and placing the expected incisions within the relaxed skin tension lines where possible. The area thus outlined was incised deep to adipose tissue with a #15 scalpel blade with preservation of a vascular pedicle. The skin margins were undermined to an appropriate distance in all directions utilizing iris scissors. The flap was then placed into the defect and anchored with interrupted buried subcutaneous sutures.
Cheek Interpolation Flap Text: A decision was made to reconstruct the defect utilizing an interpolation axial flap and a staged reconstruction. A telfa template was made of the defect. This telfa template was then used to outline the Cheek Interpolation flap. The donor area for the pedicle flap was then injected with anesthesia. The flap was excised through the skin and subcutaneous tissue down to the layer of the underlying musculature. The interpolation flap was carefully excised within this deep plane to maintain its blood supply. The edges of the donor site were undermined. The donor site was closed in a primary fashion. The pedicle was then rotated into position and sutured. Once the tube was sutured into place, adequate blood supply was confirmed with blanching and refill. The pedicle was then wrapped with xeroform gauze and dressed appropriately with a telfa and gauze bandage to ensure continued blood supply and protect the attached pedicle.
Cheek-To-Nose Interpolation Flap Text: A decision was made to reconstruct the defect utilizing an interpolation axial flap and a staged reconstruction. A telfa template was made of the defect. This telfa template was then used to outline the Cheek-To-Nose Interpolation flap. The donor area for the pedicle flap was then injected with anesthesia. The flap was excised through the skin and subcutaneous tissue down to the layer of the underlying musculature. The interpolation flap was carefully excised within this deep plane to maintain its blood supply. The edges of the donor site were undermined. The donor site was closed in a primary fashion. The pedicle was then rotated into position and sutured. Once the tube was sutured into place, adequate blood supply was confirmed with blanching and refill. The pedicle was then wrapped with xeroform gauze and dressed appropriately with a telfa and gauze bandage to ensure continued blood supply and protect the attached pedicle.
Interpolation Flap Text: A decision was made to reconstruct the defect utilizing an interpolation axial flap and a staged reconstruction. A telfa template was made of the defect. This telfa template was then used to outline the interpolation flap. The donor area for the pedicle flap was then injected with anesthesia. The flap was excised through the skin and subcutaneous tissue down to the layer of the underlying musculature. The interpolation flap was carefully excised within this deep plane to maintain its blood supply. The edges of the donor site were undermined. The donor site was closed in a primary fashion. The pedicle was then rotated into position and sutured. Once the tube was sutured into place, adequate blood supply was confirmed with blanching and refill. The pedicle was then wrapped with xeroform gauze and dressed appropriately with a telfa and gauze bandage to ensure continued blood supply and protect the attached pedicle.
Melolabial Interpolation Flap Text: A decision was made to reconstruct the defect utilizing an interpolation axial flap and a staged reconstruction. A telfa template was made of the defect. This telfa template was then used to outline the melolabial interpolation flap. The donor area for the pedicle flap was then injected with anesthesia. The flap was excised through the skin and subcutaneous tissue down to the layer of the underlying musculature. The pedicle flap was carefully excised within this deep plane to maintain its blood supply. The edges of the donor site were undermined. The donor site was closed in a primary fashion. The pedicle was then rotated into position and sutured. Once the tube was sutured into place, adequate blood supply was confirmed with blanching and refill. The pedicle was then wrapped with xeroform gauze and dressed appropriately with a telfa and gauze bandage to ensure continued blood supply and protect the attached pedicle.
Mastoid Interpolation Flap Text: A decision was made to reconstruct the defect utilizing an interpolation axial flap and a staged reconstruction. A telfa template was made of the defect. This telfa template was then used to outline the mastoid interpolation flap. The donor area for the pedicle flap was then injected with anesthesia. The flap was excised through the skin and subcutaneous tissue down to the layer of the underlying musculature. The pedicle flap was carefully excised within this deep plane to maintain its blood supply. The edges of the donor site were undermined. The donor site was closed in a primary fashion. The pedicle was then rotated into position and sutured. Once the tube was sutured into place, adequate blood supply was confirmed with blanching and refill. The pedicle was then wrapped with xeroform gauze and dressed appropriately with a telfa and gauze bandage to ensure continued blood supply and protect the attached pedicle.
Posterior Auricular Interpolation Flap Text: A decision was made to reconstruct the defect utilizing an interpolation axial flap and a staged reconstruction. A telfa template was made of the defect. This telfa template was then used to outline the posterior auricular interpolation flap. The donor area for the pedicle flap was then injected with anesthesia. The flap was excised through the skin and subcutaneous tissue down to the layer of the underlying musculature. The pedicle flap was carefully excised within this deep plane to maintain its blood supply. The edges of the donor site were undermined. The donor site was closed in a primary fashion. The pedicle was then rotated into position and sutured. Once the tube was sutured into place, adequate blood supply was confirmed with blanching and refill. The pedicle was then wrapped with xeroform gauze and dressed appropriately with a telfa and gauze bandage to ensure continued blood supply and protect the attached pedicle.
Paramedian Forehead Flap Text: A decision was made to reconstruct the defect utilizing an interpolation axial flap and a staged reconstruction. A telfa template was made of the defect. This telfa template was then used to outline the paramedian forehead pedicle flap. The donor area for the pedicle flap was then injected with anesthesia. The flap was excised through the skin and subcutaneous tissue down to the layer of the underlying musculature. The pedicle flap was carefully excised within this deep plane to maintain its blood supply. The edges of the donor site were undermined. The donor site was closed in a primary fashion. The pedicle was then rotated into position and sutured. Once the tube was sutured into place, adequate blood supply was confirmed with blanching and refill. The pedicle was then wrapped with xeroform gauze and dressed appropriately with a telfa and gauze bandage to ensure continued blood supply and protect the attached pedicle.
Abbe Flap (Upper To Lower Lip) Text: The defect of the lower lip was assessed and measured. Given the location and size of the defect, an Abbe flap was deemed most appropriate. Using a sterile surgical marker, an appropriate Abbe flap was measured and drawn on the upper lip. Local anesthesia was then infiltrated. A scalpel was then used to incise the upper lip through and through the skin, vermilion, muscle and mucosa, leaving the flap pedicled on the opposite side. The flap was then rotated and transferred to the lower lip defect. The flap was then sutured into place with a three layer technique, closing the orbicularis oris muscle layer with subcutaneous buried sutures, followed by a mucosal layer and an epidermal layer.
Abbe Flap (Lower To Upper Lip) Text: The defect of the upper lip was assessed and measured. Given the location and size of the defect, an Abbe flap was deemed most appropriate. Using a sterile surgical marker, an appropriate Abbe flap was measured and drawn on the lower lip. Local anesthesia was then infiltrated. A scalpel was then used to incise the upper lip through and through the skin, vermilion, muscle and mucosa, leaving the flap pedicled on the opposite side. The flap was then rotated and transferred to the lower lip defect. The flap was then sutured into place with a three layer technique, closing the orbicularis oris muscle layer with subcutaneous buried sutures, followed by a mucosal layer and an epidermal layer.
Estlander Flap (Upper To Lower Lip) Text: The defect of the lower lip was assessed and measured. Given the location and size of the defect, an Estlander flap was deemed most appropriate. Using a sterile surgical marker, an appropriate Estlander flap was measured and drawn on the upper lip. Local anesthesia was then infiltrated. A scalpel was then used to incise the lateral aspect of the flap, through skin, muscle and mucosa, leaving the flap pedicled medially. The flap was then rotated and positioned to fill the lower lip defect. The flap was then sutured into place with a three layer technique, closing the orbicularis oris muscle layer with subcutaneous buried sutures, followed by a mucosal layer and an epidermal layer.
Estlander Flap (Lower To Upper Lip) Text: The defect of the lower lip was assessed and measured.  Given the location and size of the defect, an Estlander flap was deemed most appropriate.  Using a sterile surgical marker, an appropriate Estlander flap was measured and drawn on the upper lip. Local anesthesia was then infiltrated. A scalpel was then used to incise the lateral aspect of the flap, through skin, muscle and mucosa, leaving the flap pedicled medially.  The flap was then rotated and positioned to fill the lower lip defect.  The flap was then sutured into place with a three layer technique, closing the orbicularis oris muscle layer with subcutaneous buried sutures, followed by a mucosal layer and an epidermal layer.
Lip Wedge Excision Repair Text: Given the location of the defect and the proximity to free margins a full thickness wedge repair was deemed most appropriate. Using a sterile surgical marker, the appropriate repair was drawn incorporating the defect and placing the expected incisions perpendicular to the vermilion border. The vermilion border was also meticulously outlined to ensure appropriate reapproximation during the repair. The area thus outlined was incised through and through with a #15 scalpel blade. The muscularis and dermis were reaproximated with deep sutures following hemostasis. Care was taken to realign the vermilion border before proceeding with the superficial closure. Once the vermilion was realigned the superfical and mucosal closure was finished.
Ftsg Text: The defect edges were debeveled with a #15 scalpel blade. Given the location of the defect, shape of the defect and the proximity to free margins a full thickness skin graft was deemed most appropriate. Using a sterile surgical marker, the primary defect shape was transferred to the donor site. The area thus outlined was incised deep to adipose tissue with a #15 scalpel blade. The harvested graft was then trimmed of adipose tissue until only dermis and epidermis was left. The skin margins of the secondary defect were undermined to an appropriate distance in all directions utilizing iris scissors. The secondary defect was closed with interrupted buried subcutaneous sutures. The skin edges were then re-apposed with running  sutures. The skin graft was then placed in the primary defect and oriented appropriately.
Split-Thickness Skin Graft Text: The defect edges were debeveled with a #15 scalpel blade. Given the location of the defect, shape of the defect and the proximity to free margins a split thickness skin graft was deemed most appropriate. Using a sterile surgical marker, the primary defect shape was transferred to the donor site. The split thickness graft was then harvested. The skin graft was then placed in the primary defect and oriented appropriately.
Burow's Graft Text: The defect edges were debeveled with a #15 scalpel blade. Given the location of the defect, shape of the defect, the proximity to free margins and the presence of a standing cone deformity a Burow's skin graft was deemed most appropriate. The standing cone was removed and this tissue was then trimmed to the shape of the primary defect. The adipose tissue was also removed until only dermis and epidermis were left. The skin margins of the secondary defect were undermined to an appropriate distance in all directions utilizing iris scissors. The secondary defect was closed with interrupted buried subcutaneous sutures. The skin edges were then re-apposed with running  sutures. The skin graft was then placed in the primary defect and oriented appropriately.
Cartilage Graft Text: The defect edges were debeveled with a #15 scalpel blade. Given the location of the defect, shape of the defect, the fact the defect involved a full thickness cartilage defect a cartilage graft was deemed most appropriate. An appropriate donor site was identified, cleansed, and anesthetized. The cartilage graft was then harvested and transferred to the recipient site, oriented appropriately and then sutured into place. The secondary defect was then repaired using a primary closure.
Composite Graft Text: The defect edges were debeveled with a #15 scalpel blade. Given the location of the defect, shape of the defect, the proximity to free margins and the fact the defect was full thickness a composite graft was deemed most appropriate. The defect was outline and then transferred to the donor site. A full thickness graft was then excised from the donor site. The graft was then placed in the primary defect, oriented appropriately and then sutured into place. The secondary defect was then repaired using a primary closure.
Epidermal Autograft Text: The defect edges were debeveled with a #15 scalpel blade. Given the location of the defect, shape of the defect and the proximity to free margins an epidermal autograft was deemed most appropriate. Using a sterile surgical marker, the primary defect shape was transferred to the donor site. The epidermal graft was then harvested. The skin graft was then placed in the primary defect and oriented appropriately.
Dermal Autograft Text: The defect edges were debeveled with a #15 scalpel blade. Given the location of the defect, shape of the defect and the proximity to free margins a dermal autograft was deemed most appropriate. Using a sterile surgical marker, the primary defect shape was transferred to the donor site. The area thus outlined was incised deep to adipose tissue with a #15 scalpel blade. The harvested graft was then trimmed of adipose and epidermal tissue until only dermis was left. The skin graft was then placed in the primary defect and oriented appropriately.
Skin Substitute Text: The defect edges were debeveled with a #15 scalpel blade. Given the location of the defect, shape of the defect and the proximity to free margins a skin substitute graft was deemed most appropriate. The graft material was trimmed to fit the size of the defect. The graft was then placed in the primary defect and oriented appropriately.
Tissue Cultured Epidermal Autograft Text: The defect edges were debeveled with a #15 scalpel blade. Given the location of the defect, shape of the defect and the proximity to free margins a tissue cultured epidermal autograft was deemed most appropriate. The graft was then trimmed to fit the size of the defect. The graft was then placed in the primary defect and oriented appropriately.
Xenograft Text: The defect edges were debeveled with a #15 scalpel blade. Given the location of the defect, shape of the defect and the proximity to free margins a xenograft was deemed most appropriate. The graft was then trimmed to fit the size of the defect. The graft was then placed in the primary defect and oriented appropriately.
Purse String (Intermediate) Text: Given the location of the defect and the characteristics of the surrounding skin a purse string intermediate closure was deemed most appropriate. Undermining was performed circumfirentially around the surgical defect. A purse string suture was then placed and tightened.
Purse String (Simple) Text: Given the location of the defect and the characteristics of the surrounding skin a purse string simple closure was deemed most appropriate. Undermining was performed circumferentially around the surgical defect. A purse string suture was then placed and tightened.
Partial Purse String (Intermediate) Text: Given the location of the defect and the characteristics of the surrounding skin an intermediate purse string closure was deemed most appropriate. Undermining was performed circumferentially around the surgical defect. A purse string suture was then placed and tightened. Wound tension of the circular defect prevented complete closure of the wound.
Partial Purse String (Simple) Text: Given the location of the defect and the characteristics of the surrounding skin a simple purse string closure was deemed most appropriate. Undermining was performed circumferentially around the surgical defect. A purse string suture was then placed and tightened. Wound tension of the circular defect prevented complete closure of the wound.
Complex Repair And Single Advancement Flap Text: The defect edges were debeveled with a #15 scalpel blade. The primary defect was closed partially with a complex linear closure. Given the location of the remaining defect, shape of the defect and the proximity to free margins a single advancement flap was deemed most appropriate for complete closure of the defect. Using a sterile surgical marker, an appropriate advancement flap was drawn incorporating the defect and placing the expected incisions within the relaxed skin tension lines where possible. The area thus outlined was incised deep to adipose tissue with a #15 scalpel blade. The skin margins were undermined to an appropriate distance in all directions utilizing iris scissors.
Complex Repair And Double Advancement Flap Text: The defect edges were debeveled with a #15 scalpel blade. The primary defect was closed partially with a complex linear closure. Given the location of the remaining defect, shape of the defect and the proximity to free margins a double advancement flap was deemed most appropriate for complete closure of the defect. Using a sterile surgical marker, an appropriate advancement flap was drawn incorporating the defect and placing the expected incisions within the relaxed skin tension lines where possible. The area thus outlined was incised deep to adipose tissue with a #15 scalpel blade. The skin margins were undermined to an appropriate distance in all directions utilizing iris scissors.
Complex Repair And Modified Advancement Flap Text: The defect edges were debeveled with a #15 scalpel blade. The primary defect was closed partially with a complex linear closure. Given the location of the remaining defect, shape of the defect and the proximity to free margins a modified advancement flap was deemed most appropriate for complete closure of the defect. Using a sterile surgical marker, an appropriate advancement flap was drawn incorporating the defect and placing the expected incisions within the relaxed skin tension lines where possible. The area thus outlined was incised deep to adipose tissue with a #15 scalpel blade. The skin margins were undermined to an appropriate distance in all directions utilizing iris scissors.
Complex Repair And A-T Advancement Flap Text: The defect edges were debeveled with a #15 scalpel blade. The primary defect was closed partially with a complex linear closure. Given the location of the remaining defect, shape of the defect and the proximity to free margins an A-T advancement flap was deemed most appropriate for complete closure of the defect. Using a sterile surgical marker, an appropriate advancement flap was drawn incorporating the defect and placing the expected incisions within the relaxed skin tension lines where possible. The area thus outlined was incised deep to adipose tissue with a #15 scalpel blade. The skin margins were undermined to an appropriate distance in all directions utilizing iris scissors.
Complex Repair And O-T Advancement Flap Text: The defect edges were debeveled with a #15 scalpel blade. The primary defect was closed partially with a complex linear closure. Given the location of the remaining defect, shape of the defect and the proximity to free margins an O-T advancement flap was deemed most appropriate for complete closure of the defect. Using a sterile surgical marker, an appropriate advancement flap was drawn incorporating the defect and placing the expected incisions within the relaxed skin tension lines where possible. The area thus outlined was incised deep to adipose tissue with a #15 scalpel blade. The skin margins were undermined to an appropriate distance in all directions utilizing iris scissors.
Complex Repair And O-L Flap Text: The defect edges were debeveled with a #15 scalpel blade. The primary defect was closed partially with a complex linear closure. Given the location of the remaining defect, shape of the defect and the proximity to free margins an O-L flap was deemed most appropriate for complete closure of the defect. Using a sterile surgical marker, an appropriate flap was drawn incorporating the defect and placing the expected incisions within the relaxed skin tension lines where possible. The area thus outlined was incised deep to adipose tissue with a #15 scalpel blade. The skin margins were undermined to an appropriate distance in all directions utilizing iris scissors.
Complex Repair And Bilobe Flap Text: The defect edges were debeveled with a #15 scalpel blade. The primary defect was closed partially with a complex linear closure. Given the location of the remaining defect, shape of the defect and the proximity to free margins a bilobe flap was deemed most appropriate for complete closure of the defect. Using a sterile surgical marker, an appropriate advancement flap was drawn incorporating the defect and placing the expected incisions within the relaxed skin tension lines where possible. The area thus outlined was incised deep to adipose tissue with a #15 scalpel blade. The skin margins were undermined to an appropriate distance in all directions utilizing iris scissors.
Complex Repair And Melolabial Flap Text: The defect edges were debeveled with a #15 scalpel blade. The primary defect was closed partially with a complex linear closure. Given the location of the remaining defect, shape of the defect and the proximity to free margins a melolabial flap was deemed most appropriate for complete closure of the defect. Using a sterile surgical marker, an appropriate advancement flap was drawn incorporating the defect and placing the expected incisions within the relaxed skin tension lines where possible. The area thus outlined was incised deep to adipose tissue with a #15 scalpel blade. The skin margins were undermined to an appropriate distance in all directions utilizing iris scissors.
Complex Repair And Rotation Flap Text: The defect edges were debeveled with a #15 scalpel blade. The primary defect was closed partially with a complex linear closure. Given the location of the remaining defect, shape of the defect and the proximity to free margins a rotation flap was deemed most appropriate for complete closure of the defect. Using a sterile surgical marker, an appropriate advancement flap was drawn incorporating the defect and placing the expected incisions within the relaxed skin tension lines where possible. The area thus outlined was incised deep to adipose tissue with a #15 scalpel blade. The skin margins were undermined to an appropriate distance in all directions utilizing iris scissors.
Complex Repair And Rhombic Flap Text: The defect edges were debeveled with a #15 scalpel blade. The primary defect was closed partially with a complex linear closure. Given the location of the remaining defect, shape of the defect and the proximity to free margins a rhombic flap was deemed most appropriate for complete closure of the defect. Using a sterile surgical marker, an appropriate advancement flap was drawn incorporating the defect and placing the expected incisions within the relaxed skin tension lines where possible. The area thus outlined was incised deep to adipose tissue with a #15 scalpel blade. The skin margins were undermined to an appropriate distance in all directions utilizing iris scissors.
Complex Repair And Transposition Flap Text: The defect edges were debeveled with a #15 scalpel blade. The primary defect was closed partially with a complex linear closure. Given the location of the remaining defect, shape of the defect and the proximity to free margins a transposition flap was deemed most appropriate for complete closure of the defect. Using a sterile surgical marker, an appropriate advancement flap was drawn incorporating the defect and placing the expected incisions within the relaxed skin tension lines where possible. The area thus outlined was incised deep to adipose tissue with a #15 scalpel blade. The skin margins were undermined to an appropriate distance in all directions utilizing iris scissors.
Complex Repair And V-Y Plasty Text: The defect edges were debeveled with a #15 scalpel blade. The primary defect was closed partially with a complex linear closure. Given the location of the remaining defect, shape of the defect and the proximity to free margins a V-Y plasty was deemed most appropriate for complete closure of the defect. Using a sterile surgical marker, an appropriate advancement flap was drawn incorporating the defect and placing the expected incisions within the relaxed skin tension lines where possible. The area thus outlined was incised deep to adipose tissue with a #15 scalpel blade. The skin margins were undermined to an appropriate distance in all directions utilizing iris scissors.
Complex Repair And M Plasty Text: The defect edges were debeveled with a #15 scalpel blade. The primary defect was closed partially with a complex linear closure. Given the location of the remaining defect, shape of the defect and the proximity to free margins an M plasty was deemed most appropriate for complete closure of the defect. Using a sterile surgical marker, an appropriate advancement flap was drawn incorporating the defect and placing the expected incisions within the relaxed skin tension lines where possible. The area thus outlined was incised deep to adipose tissue with a #15 scalpel blade. The skin margins were undermined to an appropriate distance in all directions utilizing iris scissors.
Complex Repair And Double M Plasty Text: The defect edges were debeveled with a #15 scalpel blade. The primary defect was closed partially with a complex linear closure. Given the location of the remaining defect, shape of the defect and the proximity to free margins a double M plasty was deemed most appropriate for complete closure of the defect. Using a sterile surgical marker, an appropriate advancement flap was drawn incorporating the defect and placing the expected incisions within the relaxed skin tension lines where possible. The area thus outlined was incised deep to adipose tissue with a #15 scalpel blade. The skin margins were undermined to an appropriate distance in all directions utilizing iris scissors.
Complex Repair And W Plasty Text: The defect edges were debeveled with a #15 scalpel blade. The primary defect was closed partially with a complex linear closure. Given the location of the remaining defect, shape of the defect and the proximity to free margins a W plasty was deemed most appropriate for complete closure of the defect. Using a sterile surgical marker, an appropriate advancement flap was drawn incorporating the defect and placing the expected incisions within the relaxed skin tension lines where possible. The area thus outlined was incised deep to adipose tissue with a #15 scalpel blade. The skin margins were undermined to an appropriate distance in all directions utilizing iris scissors.
Complex Repair And Z Plasty Text: The defect edges were debeveled with a #15 scalpel blade. The primary defect was closed partially with a complex linear closure. Given the location of the remaining defect, shape of the defect and the proximity to free margins a Z plasty was deemed most appropriate for complete closure of the defect. Using a sterile surgical marker, an appropriate advancement flap was drawn incorporating the defect and placing the expected incisions within the relaxed skin tension lines where possible. The area thus outlined was incised deep to adipose tissue with a #15 scalpel blade. The skin margins were undermined to an appropriate distance in all directions utilizing iris scissors.
Complex Repair And Dorsal Nasal Flap Text: The defect edges were debeveled with a #15 scalpel blade. The primary defect was closed partially with a complex linear closure. Given the location of the remaining defect, shape of the defect and the proximity to free margins a dorsal nasal flap was deemed most appropriate for complete closure of the defect. Using a sterile surgical marker, an appropriate flap was drawn incorporating the defect and placing the expected incisions within the relaxed skin tension lines where possible. The area thus outlined was incised deep to adipose tissue with a #15 scalpel blade. The skin margins were undermined to an appropriate distance in all directions utilizing iris scissors.
Complex Repair And Ftsg Text: The defect edges were debeveled with a #15 scalpel blade. The primary defect was closed partially with a complex linear closure. Given the location of the defect, shape of the defect and the proximity to free margins a full thickness skin graft was deemed most appropriate to repair the remaining defect. The graft was trimmed to fit the size of the remaining defect. The graft was then placed in the primary defect, oriented appropriately, and sutured into place.
Complex Repair And Burow's Graft Text: The defect edges were debeveled with a #15 scalpel blade. The primary defect was closed partially with a complex linear closure. Given the location of the defect, shape of the defect, the proximity to free margins and the presence of a standing cone deformity a Burow's graft was deemed most appropriate to repair the remaining defect. The graft was trimmed to fit the size of the remaining defect. The graft was then placed in the primary defect, oriented appropriately, and sutured into place.
Complex Repair And Split-Thickness Skin Graft Text: The defect edges were debeveled with a #15 scalpel blade. The primary defect was closed partially with a complex linear closure. Given the location of the defect, shape of the defect and the proximity to free margins a split thickness skin graft was deemed most appropriate to repair the remaining defect. The graft was trimmed to fit the size of the remaining defect. The graft was then placed in the primary defect, oriented appropriately, and sutured into place.
Complex Repair And Epidermal Autograft Text: The defect edges were debeveled with a #15 scalpel blade. The primary defect was closed partially with a complex linear closure. Given the location of the defect, shape of the defect and the proximity to free margins an epidermal autograft was deemed most appropriate to repair the remaining defect. The graft was trimmed to fit the size of the remaining defect. The graft was then placed in the primary defect, oriented appropriately, and sutured into place.
Complex Repair And Dermal Autograft Text: The defect edges were debeveled with a #15 scalpel blade. The primary defect was closed partially with a complex linear closure. Given the location of the defect, shape of the defect and the proximity to free margins an dermal autograft was deemed most appropriate to repair the remaining defect. The graft was trimmed to fit the size of the remaining defect. The graft was then placed in the primary defect, oriented appropriately, and sutured into place.
Complex Repair And Tissue Cultured Epidermal Autograft Text: The defect edges were debeveled with a #15 scalpel blade. The primary defect was closed partially with a complex linear closure. Given the location of the defect, shape of the defect and the proximity to free margins an tissue cultured epidermal autograft was deemed most appropriate to repair the remaining defect. The graft was trimmed to fit the size of the remaining defect. The graft was then placed in the primary defect, oriented appropriately, and sutured into place.
Complex Repair And Xenograft Text: The defect edges were debeveled with a #15 scalpel blade. The primary defect was closed partially with a complex linear closure. Given the location of the defect, shape of the defect and the proximity to free margins a xenograft was deemed most appropriate to repair the remaining defect. The graft was trimmed to fit the size of the remaining defect. The graft was then placed in the primary defect, oriented appropriately, and sutured into place.
Complex Repair And Skin Substitute Graft Text: The defect edges were debeveled with a #15 scalpel blade. The primary defect was closed partially with a complex linear closure. Given the location of the remaining defect, shape of the defect and the proximity to free margins a skin substitute graft was deemed most appropriate to repair the remaining defect. The graft was trimmed to fit the size of the remaining defect. The graft was then placed in the primary defect, oriented appropriately, and sutured into place.
Path Notes (To The Dermatopathologist): Size 1.5cm x 1.0cm  \\nSCC in situ check margins
Consent was obtained from the patient. The risks and benefits to therapy were discussed in detail. Specifically, the risks of infection, scarring, bleeding, prolonged wound healing, incomplete removal, allergy to anesthesia, nerve injury and recurrence were addressed. Prior to the procedure, the treatment site was clearly identified and confirmed by the patient. All components of Universal Protocol/PAUSE Rule completed.
Post-Care Instructions: I reviewed with the patient in detail post-care instructions. Patient is not to engage in any heavy lifting, exercise, or swimming for the next 14 days. Should the patient develop any fevers, chills, bleeding, severe pain patient will contact the office immediately.
Home Suture Removal Text: Patient was provided a home suture removal kit and will remove their sutures at home. If they have any questions or difficulties they will call the office.
Where Do You Want The Question To Include Opioid Counseling Located?: Case Summary Tab
Information: Selecting Yes will display possible errors in your note based on the variables you have selected. This validation is only offered as a suggestion for you. PLEASE NOTE THAT THE VALIDATION TEXT WILL BE REMOVED WHEN YOU FINALIZE YOUR NOTE. IF YOU WANT TO FAX A PRELIMINARY NOTE YOU WILL NEED TO TOGGLE THIS TO 'NO' IF YOU DO NOT WANT IT IN YOUR FAXED NOTE.

## 2022-11-01 NOTE — PROCEDURE: BIOPSY BY SHAVE METHOD
Body Location Override (Optional - Billing Will Still Be Based On Selected Body Map Location If Applicable): left lateral lower leg
Detail Level: Detailed
Depth Of Biopsy: dermis
Was A Bandage Applied: Yes
Size Of Lesion In Cm: 4.5
X Size Of Lesion In Cm: 4
Biopsy Type: H and E
Biopsy Method: Personna blade
Anesthesia Type: 1% lidocaine with epinephrine
Anesthesia Volume In Cc (Will Not Render If 0): 3
Additional Anesthesia Volume In Cc (Will Not Render If 0): 0
Hemostasis: Electrocautery
Wound Care: Bacitracin
Dressing: bandage
Destruction After The Procedure: No
Type Of Destruction Used: Curettage
Curettage Text: The wound bed was treated with curettage after the biopsy was performed.
Cryotherapy Text: The wound bed was treated with cryotherapy after the biopsy was performed.
Electrodesiccation Text: The wound bed was treated with electrodesiccation after the biopsy was performed.
Electrodesiccation And Curettage Text: The wound bed was treated with electrodesiccation and curettage after the biopsy was performed.
Silver Nitrate Text: The wound bed was treated with silver nitrate after the biopsy was performed.
Lab: [address]
Lab Facility: [address] [place] [address]
Path Notes (To The Dermatopathologist): Size: 4.5cm x 4.0cm R/O: SCC vs BCC
Consent: Written consent was obtained and risks were reviewed including but not limited to scarring, infection, bleeding, scabbing, incomplete removal, nerve damage and allergy to anesthesia.
Post-Care Instructions: I reviewed with the patient in detail post-care instructions. Patient is to keep the biopsy site dry overnight, and then apply bacitracin twice daily until healed. Patient may apply hydrogen peroxide soaks to remove any crusting.
Notification Instructions: Patient will be notified of biopsy results. However, patient instructed to call the office if not contacted within 2 weeks.
Billing Type: [employer]
Information: Selecting Yes will display possible errors in your note based on the variables you have selected. This validation is only offered as a suggestion for you. PLEASE NOTE THAT THE VALIDATION TEXT WILL BE REMOVED WHEN YOU FINALIZE YOUR NOTE. IF YOU WANT TO FAX A PRELIMINARY NOTE YOU WILL NEED TO TOGGLE THIS TO 'NO' IF YOU DO NOT WANT IT IN YOUR FAXED NOTE.

## 2022-11-01 NOTE — PROCEDURE: MIPS QUALITY
Detail Level: Detailed
Quality 110: Preventive Care And Screening: Influenza Immunization: Influenza immunization was not ordered or administered, reason not given
Quality 111:Pneumonia Vaccination Status For Older Adults: Pneumococcal vaccine (PPSV23) was not administered on or after patientâs 60th birthday and before the end of the measurement period, reason not otherwise specified
Quality 226: Preventive Care And Screening: Tobacco Use: Screening And Cessation Intervention: Patient screened for tobacco use and is an ex/non-smoker
Quality 130: Documentation Of Current Medications In The Medical Record: Current Medications Documented
Quality 431: Preventive Care And Screening: Unhealthy Alcohol Use - Screening: Patient not identified as an unhealthy alcohol user when screened for unhealthy alcohol use using a systematic screening method

## 2022-11-01 NOTE — PROCEDURE: CONSULTATION FOR MOHS SURGERY
Detail Level: Simple
Body Location Override (Optional - Billing Will Still Be Based On Selected Body Map Location If Applicable): left lateral cheek
X Size Of Lesion In Cm (Optional): 0
Incorporate Mauc In Note: Yes
Body Location Override (Optional - Billing Will Still Be Based On Selected Body Map Location If Applicable): left helix
Body Location Override (Optional - Billing Will Still Be Based On Selected Body Map Location If Applicable): right mid radial forearm
Body Location Override (Optional - Billing Will Still Be Based On Selected Body Map Location If Applicable): Left chest

## 2022-11-01 NOTE — PROCEDURE: CONSULTATION EXCISION
X Size Of Lesion In Cm (Optional): 1
Size Of Lesion: 1.5
Detail Level: Detailed
Anatomic Location From Referring Provider: right mid chest

## 2023-03-20 NOTE — ED NOTES
Agree with triage note. PIV inserted. ERP at bedside.   
Dr. Hernandez notified of pt .  
En route to OR  
Med rec complete via interview with pt at bedside. Pt denies taking any prescription medications. Allergies reviewed. Pt denies antibiotic use in past 14 days.    
Pt in radiology   
Report given to nicola winters  
Report received from SARAH Thompson  
[Negative] : Neurological

## 2023-04-06 ENCOUNTER — APPOINTMENT (OUTPATIENT)
Dept: ADMISSIONS | Facility: MEDICAL CENTER | Age: 83
End: 2023-04-06
Attending: OTOLARYNGOLOGY
Payer: MEDICARE

## 2023-04-10 ENCOUNTER — PRE-ADMISSION TESTING (OUTPATIENT)
Dept: ADMISSIONS | Facility: MEDICAL CENTER | Age: 83
End: 2023-04-10
Attending: OTOLARYNGOLOGY
Payer: MEDICARE

## 2023-04-10 NOTE — OR NURSING
Pt stated that he did not have a ride set up for DOS as of today 4/10/23. Educated pt that he would need a ride for after surgery, as well as if he could not find a ride he would need to notify Dr Lino office. Pt verbalized understanding.

## 2023-04-17 ENCOUNTER — HOSPITAL ENCOUNTER (OUTPATIENT)
Facility: MEDICAL CENTER | Age: 83
End: 2023-04-17
Attending: INTERNAL MEDICINE
Payer: MEDICARE

## 2023-04-17 LAB
ALBUMIN SERPL BCP-MCNC: 4 G/DL (ref 3.2–4.9)
ALBUMIN/GLOB SERPL: 1.9 G/DL
ALP SERPL-CCNC: 170 U/L (ref 30–99)
ALT SERPL-CCNC: 24 U/L (ref 2–50)
ANION GAP SERPL CALC-SCNC: 13 MMOL/L (ref 7–16)
AST SERPL-CCNC: 15 U/L (ref 12–45)
BILIRUB SERPL-MCNC: 0.4 MG/DL (ref 0.1–1.5)
BUN SERPL-MCNC: 19 MG/DL (ref 8–22)
CALCIUM ALBUM COR SERPL-MCNC: 9.2 MG/DL (ref 8.5–10.5)
CALCIUM SERPL-MCNC: 9.2 MG/DL (ref 8.5–10.5)
CHLORIDE SERPL-SCNC: 107 MMOL/L (ref 96–112)
CO2 SERPL-SCNC: 20 MMOL/L (ref 20–33)
CREAT SERPL-MCNC: 1.24 MG/DL (ref 0.5–1.4)
GFR SERPLBLD CREATININE-BSD FMLA CKD-EPI: 58 ML/MIN/1.73 M 2
GLOBULIN SER CALC-MCNC: 2.1 G/DL (ref 1.9–3.5)
GLUCOSE SERPL-MCNC: 93 MG/DL (ref 65–99)
HGB RETIC QN AUTO: 26.7 PG/CELL (ref 29–35)
IMM RETICS NFR: 12.1 % (ref 9.3–17.4)
LDH SERPL L TO P-CCNC: 153 U/L (ref 107–266)
POTASSIUM SERPL-SCNC: 4.3 MMOL/L (ref 3.6–5.5)
PROT SERPL-MCNC: 6.1 G/DL (ref 6–8.2)
RETICS # AUTO: 0.05 M/UL (ref 0.04–0.06)
RETICS/RBC NFR: 1.4 % (ref 0.8–2.1)
SODIUM SERPL-SCNC: 140 MMOL/L (ref 135–145)

## 2023-04-17 PROCEDURE — 84155 ASSAY OF PROTEIN SERUM: CPT | Mod: XU

## 2023-04-17 PROCEDURE — 82784 ASSAY IGA/IGD/IGG/IGM EACH: CPT

## 2023-04-17 PROCEDURE — 82232 ASSAY OF BETA-2 PROTEIN: CPT

## 2023-04-17 PROCEDURE — 80053 COMPREHEN METABOLIC PANEL: CPT

## 2023-04-17 PROCEDURE — 85046 RETICYTE/HGB CONCENTRATE: CPT

## 2023-04-17 PROCEDURE — 84165 PROTEIN E-PHORESIS SERUM: CPT

## 2023-04-17 PROCEDURE — 83615 LACTATE (LD) (LDH) ENZYME: CPT

## 2023-04-19 LAB
B2 MICROGLOB SERPL-MCNC: 4.8 MG/L
IGG SERPL-MCNC: 609 MG/DL (ref 768–1632)

## 2023-04-20 LAB
ALBUMIN SERPL ELPH-MCNC: 3.79 G/DL (ref 3.75–5.01)
ALPHA1 GLOB SERPL ELPH-MCNC: 0.37 G/DL (ref 0.19–0.46)
ALPHA2 GLOB SERPL ELPH-MCNC: 0.7 G/DL (ref 0.48–1.05)
B-GLOBULIN SERPL ELPH-MCNC: 0.82 G/DL (ref 0.48–1.1)
EER PROT ELECT SER Q1092: NORMAL
GAMMA GLOB SERPL ELPH-MCNC: 0.63 G/DL (ref 0.62–1.51)
INTERPRETATION SERPL IFE-IMP: NORMAL
MONOCLONAL PROTEIN NL11656: NORMAL G/DL
PROT SERPL-MCNC: 6.3 G/DL (ref 6.3–8.2)

## 2023-04-24 ENCOUNTER — ANESTHESIA EVENT (OUTPATIENT)
Dept: SURGERY | Facility: MEDICAL CENTER | Age: 83
End: 2023-04-24
Payer: MEDICARE

## 2023-04-24 ENCOUNTER — HOSPITAL ENCOUNTER (OUTPATIENT)
Facility: MEDICAL CENTER | Age: 83
End: 2023-04-24
Attending: OTOLARYNGOLOGY | Admitting: OTOLARYNGOLOGY
Payer: MEDICARE

## 2023-04-24 ENCOUNTER — ANESTHESIA (OUTPATIENT)
Dept: SURGERY | Facility: MEDICAL CENTER | Age: 83
End: 2023-04-24
Payer: MEDICARE

## 2023-04-24 VITALS
SYSTOLIC BLOOD PRESSURE: 135 MMHG | OXYGEN SATURATION: 94 % | HEIGHT: 63 IN | WEIGHT: 148.15 LBS | RESPIRATION RATE: 15 BRPM | DIASTOLIC BLOOD PRESSURE: 72 MMHG | TEMPERATURE: 97.8 F | HEART RATE: 72 BPM | BODY MASS INDEX: 26.25 KG/M2

## 2023-04-24 LAB
ANISOCYTOSIS BLD QL SMEAR: ABNORMAL
BASOPHILS # BLD AUTO: 0 % (ref 0–1.8)
BASOPHILS # BLD: 0 K/UL (ref 0–0.12)
EOSINOPHIL # BLD AUTO: 0 K/UL (ref 0–0.51)
EOSINOPHIL NFR BLD: 0 % (ref 0–6.9)
ERYTHROCYTE [DISTWIDTH] IN BLOOD BY AUTOMATED COUNT: 50 FL (ref 35.9–50)
HCT VFR BLD AUTO: 34.6 % (ref 42–52)
HGB BLD-MCNC: 10.5 G/DL (ref 14–18)
HYPOCHROMIA BLD QL SMEAR: ABNORMAL
LYMPHOCYTES # BLD AUTO: 56.44 K/UL (ref 1–4.8)
LYMPHOCYTES NFR BLD: 90.6 % (ref 22–41)
MANUAL DIFF BLD: NORMAL
MCH RBC QN AUTO: 25.1 PG (ref 27–33)
MCHC RBC AUTO-ENTMCNC: 30.3 G/DL (ref 33.7–35.3)
MCV RBC AUTO: 82.6 FL (ref 81.4–97.8)
MICROCYTES BLD QL SMEAR: ABNORMAL
MONOCYTES # BLD AUTO: 0 K/UL (ref 0–0.85)
MONOCYTES NFR BLD AUTO: 0 % (ref 0–13.4)
MORPHOLOGY BLD-IMP: NORMAL
NEUTROPHILS # BLD AUTO: 5.86 K/UL (ref 1.82–7.42)
NEUTROPHILS NFR BLD: 9.4 % (ref 44–72)
NRBC # BLD AUTO: 0 K/UL
NRBC BLD-RTO: 0 /100 WBC
OVALOCYTES BLD QL SMEAR: NORMAL
PATHOLOGY CONSULT NOTE: NORMAL
PLATELET # BLD AUTO: 165 K/UL (ref 164–446)
PLATELET BLD QL SMEAR: NORMAL
PMV BLD AUTO: 10.9 FL (ref 9–12.9)
POIKILOCYTOSIS BLD QL SMEAR: NORMAL
RBC # BLD AUTO: 4.19 M/UL (ref 4.7–6.1)
RBC BLD AUTO: PRESENT
SMUDGE CELLS BLD QL SMEAR: NORMAL
WBC # BLD AUTO: 62.3 K/UL (ref 4.8–10.8)

## 2023-04-24 PROCEDURE — 160048 HCHG OR STATISTICAL LEVEL 1-5: Performed by: OTOLARYNGOLOGY

## 2023-04-24 PROCEDURE — 160002 HCHG RECOVERY MINUTES (STAT): Performed by: OTOLARYNGOLOGY

## 2023-04-24 PROCEDURE — 700105 HCHG RX REV CODE 258: Performed by: OTOLARYNGOLOGY

## 2023-04-24 PROCEDURE — A9270 NON-COVERED ITEM OR SERVICE: HCPCS

## 2023-04-24 PROCEDURE — 85025 COMPLETE CBC W/AUTO DIFF WBC: CPT

## 2023-04-24 PROCEDURE — 160009 HCHG ANES TIME/MIN: Performed by: OTOLARYNGOLOGY

## 2023-04-24 PROCEDURE — 160028 HCHG SURGERY MINUTES - 1ST 30 MINS LEVEL 3: Performed by: OTOLARYNGOLOGY

## 2023-04-24 PROCEDURE — 160046 HCHG PACU - 1ST 60 MINS PHASE II: Performed by: OTOLARYNGOLOGY

## 2023-04-24 PROCEDURE — 88342 IMHCHEM/IMCYTCHM 1ST ANTB: CPT

## 2023-04-24 PROCEDURE — 36415 COLL VENOUS BLD VENIPUNCTURE: CPT

## 2023-04-24 PROCEDURE — 160025 RECOVERY II MINUTES (STATS): Performed by: OTOLARYNGOLOGY

## 2023-04-24 PROCEDURE — 700102 HCHG RX REV CODE 250 W/ 637 OVERRIDE(OP)

## 2023-04-24 PROCEDURE — 85007 BL SMEAR W/DIFF WBC COUNT: CPT

## 2023-04-24 PROCEDURE — 00320 ANES ALL PX NECK NOS 1YR/>: CPT | Performed by: ANESTHESIOLOGY

## 2023-04-24 PROCEDURE — 99100 ANES PT EXTEME AGE<1 YR&>70: CPT | Performed by: ANESTHESIOLOGY

## 2023-04-24 PROCEDURE — 160039 HCHG SURGERY MINUTES - EA ADDL 1 MIN LEVEL 3: Performed by: OTOLARYNGOLOGY

## 2023-04-24 PROCEDURE — 160035 HCHG PACU - 1ST 60 MINS PHASE I: Performed by: OTOLARYNGOLOGY

## 2023-04-24 PROCEDURE — 700111 HCHG RX REV CODE 636 W/ 250 OVERRIDE (IP): Performed by: ANESTHESIOLOGY

## 2023-04-24 PROCEDURE — 700101 HCHG RX REV CODE 250: Performed by: ANESTHESIOLOGY

## 2023-04-24 PROCEDURE — C1729 CATH, DRAINAGE: HCPCS | Performed by: OTOLARYNGOLOGY

## 2023-04-24 PROCEDURE — 88341 IMHCHEM/IMCYTCHM EA ADD ANTB: CPT

## 2023-04-24 PROCEDURE — 88305 TISSUE EXAM BY PATHOLOGIST: CPT | Mod: 59

## 2023-04-24 PROCEDURE — 88331 PATH CONSLTJ SURG 1 BLK 1SPC: CPT

## 2023-04-24 PROCEDURE — 700101 HCHG RX REV CODE 250: Performed by: OTOLARYNGOLOGY

## 2023-04-24 RX ORDER — CEFAZOLIN SODIUM 1 G/3ML
INJECTION, POWDER, FOR SOLUTION INTRAMUSCULAR; INTRAVENOUS PRN
Status: DISCONTINUED | OUTPATIENT
Start: 2023-04-24 | End: 2023-04-24 | Stop reason: SURG

## 2023-04-24 RX ORDER — MIDAZOLAM HYDROCHLORIDE 1 MG/ML
1 INJECTION INTRAMUSCULAR; INTRAVENOUS
Status: DISCONTINUED | OUTPATIENT
Start: 2023-04-24 | End: 2023-04-24 | Stop reason: HOSPADM

## 2023-04-24 RX ORDER — ONDANSETRON 2 MG/ML
INJECTION INTRAMUSCULAR; INTRAVENOUS PRN
Status: DISCONTINUED | OUTPATIENT
Start: 2023-04-24 | End: 2023-04-24 | Stop reason: SURG

## 2023-04-24 RX ORDER — MEPERIDINE HYDROCHLORIDE 25 MG/ML
25 INJECTION INTRAMUSCULAR; INTRAVENOUS; SUBCUTANEOUS
Status: DISCONTINUED | OUTPATIENT
Start: 2023-04-24 | End: 2023-04-24 | Stop reason: HOSPADM

## 2023-04-24 RX ORDER — SODIUM CHLORIDE, SODIUM LACTATE, POTASSIUM CHLORIDE, CALCIUM CHLORIDE 600; 310; 30; 20 MG/100ML; MG/100ML; MG/100ML; MG/100ML
INJECTION, SOLUTION INTRAVENOUS CONTINUOUS
Status: DISCONTINUED | OUTPATIENT
Start: 2023-04-24 | End: 2023-04-24 | Stop reason: HOSPADM

## 2023-04-24 RX ORDER — IPRATROPIUM BROMIDE AND ALBUTEROL SULFATE 2.5; .5 MG/3ML; MG/3ML
3 SOLUTION RESPIRATORY (INHALATION)
Status: DISCONTINUED | OUTPATIENT
Start: 2023-04-24 | End: 2023-04-24 | Stop reason: HOSPADM

## 2023-04-24 RX ORDER — DEXAMETHASONE SODIUM PHOSPHATE 4 MG/ML
INJECTION, SOLUTION INTRA-ARTICULAR; INTRALESIONAL; INTRAMUSCULAR; INTRAVENOUS; SOFT TISSUE PRN
Status: DISCONTINUED | OUTPATIENT
Start: 2023-04-24 | End: 2023-04-24 | Stop reason: SURG

## 2023-04-24 RX ORDER — LIDOCAINE HYDROCHLORIDE AND EPINEPHRINE 10; 10 MG/ML; UG/ML
INJECTION, SOLUTION INFILTRATION; PERINEURAL
Status: DISCONTINUED | OUTPATIENT
Start: 2023-04-24 | End: 2023-04-24 | Stop reason: HOSPADM

## 2023-04-24 RX ORDER — LIDOCAINE HYDROCHLORIDE 20 MG/ML
INJECTION, SOLUTION EPIDURAL; INFILTRATION; INTRACAUDAL; PERINEURAL PRN
Status: DISCONTINUED | OUTPATIENT
Start: 2023-04-24 | End: 2023-04-24 | Stop reason: SURG

## 2023-04-24 RX ORDER — BACITRACIN ZINC 500 [USP'U]/G
OINTMENT TOPICAL
Status: DISCONTINUED
Start: 2023-04-24 | End: 2023-04-24 | Stop reason: HOSPADM

## 2023-04-24 RX ORDER — DIPHENHYDRAMINE HYDROCHLORIDE 50 MG/ML
12.5 INJECTION INTRAMUSCULAR; INTRAVENOUS
Status: DISCONTINUED | OUTPATIENT
Start: 2023-04-24 | End: 2023-04-24 | Stop reason: HOSPADM

## 2023-04-24 RX ORDER — OXYCODONE HCL 5 MG/5 ML
5 SOLUTION, ORAL ORAL
Status: DISCONTINUED | OUTPATIENT
Start: 2023-04-24 | End: 2023-04-24 | Stop reason: HOSPADM

## 2023-04-24 RX ORDER — HYDROMORPHONE HYDROCHLORIDE 1 MG/ML
0.5 INJECTION, SOLUTION INTRAMUSCULAR; INTRAVENOUS; SUBCUTANEOUS
Status: DISCONTINUED | OUTPATIENT
Start: 2023-04-24 | End: 2023-04-24 | Stop reason: HOSPADM

## 2023-04-24 RX ORDER — OXYCODONE HCL 5 MG/5 ML
10 SOLUTION, ORAL ORAL
Status: DISCONTINUED | OUTPATIENT
Start: 2023-04-24 | End: 2023-04-24 | Stop reason: HOSPADM

## 2023-04-24 RX ORDER — HYDROMORPHONE HYDROCHLORIDE 1 MG/ML
0.2 INJECTION, SOLUTION INTRAMUSCULAR; INTRAVENOUS; SUBCUTANEOUS
Status: DISCONTINUED | OUTPATIENT
Start: 2023-04-24 | End: 2023-04-24 | Stop reason: HOSPADM

## 2023-04-24 RX ORDER — ONDANSETRON 2 MG/ML
4 INJECTION INTRAMUSCULAR; INTRAVENOUS
Status: DISCONTINUED | OUTPATIENT
Start: 2023-04-24 | End: 2023-04-24 | Stop reason: HOSPADM

## 2023-04-24 RX ORDER — HYDROMORPHONE HYDROCHLORIDE 1 MG/ML
0.1 INJECTION, SOLUTION INTRAMUSCULAR; INTRAVENOUS; SUBCUTANEOUS
Status: DISCONTINUED | OUTPATIENT
Start: 2023-04-24 | End: 2023-04-24 | Stop reason: HOSPADM

## 2023-04-24 RX ORDER — EPINEPHRINE 1 MG/ML
INJECTION INTRAMUSCULAR; INTRAVENOUS; SUBCUTANEOUS
Status: DISCONTINUED
Start: 2023-04-24 | End: 2023-04-24 | Stop reason: HOSPADM

## 2023-04-24 RX ORDER — LIDOCAINE HYDROCHLORIDE 10 MG/ML
INJECTION, SOLUTION EPIDURAL; INFILTRATION; INTRACAUDAL; PERINEURAL
Status: DISCONTINUED
Start: 2023-04-24 | End: 2023-04-24 | Stop reason: HOSPADM

## 2023-04-24 RX ORDER — EPINEPHRINE 1 MG/ML(1)
AMPUL (ML) INJECTION
Status: DISCONTINUED
Start: 2023-04-24 | End: 2023-04-24 | Stop reason: HOSPADM

## 2023-04-24 RX ORDER — GINSENG 100 MG
CAPSULE ORAL
Status: DISCONTINUED | OUTPATIENT
Start: 2023-04-24 | End: 2023-04-24 | Stop reason: HOSPADM

## 2023-04-24 RX ORDER — PHENYLEPHRINE HCL IN 0.9% NACL 0.5 MG/5ML
SYRINGE (ML) INTRAVENOUS PRN
Status: DISCONTINUED | OUTPATIENT
Start: 2023-04-24 | End: 2023-04-24 | Stop reason: SURG

## 2023-04-24 RX ORDER — SODIUM CHLORIDE, SODIUM LACTATE, POTASSIUM CHLORIDE, CALCIUM CHLORIDE 600; 310; 30; 20 MG/100ML; MG/100ML; MG/100ML; MG/100ML
INJECTION, SOLUTION INTRAVENOUS CONTINUOUS
Status: ACTIVE | OUTPATIENT
Start: 2023-04-24 | End: 2023-04-24

## 2023-04-24 RX ADMIN — ONDANSETRON 4 MG: 2 INJECTION INTRAMUSCULAR; INTRAVENOUS at 13:05

## 2023-04-24 RX ADMIN — CEFAZOLIN 2 G: 330 INJECTION, POWDER, FOR SOLUTION INTRAMUSCULAR; INTRAVENOUS at 12:45

## 2023-04-24 RX ADMIN — MIDAZOLAM 2 MG: 1 INJECTION INTRAMUSCULAR; INTRAVENOUS at 12:45

## 2023-04-24 RX ADMIN — PROPOFOL 120 MG: 10 INJECTION, EMULSION INTRAVENOUS at 12:50

## 2023-04-24 RX ADMIN — Medication 200 MCG: at 13:40

## 2023-04-24 RX ADMIN — SUGAMMADEX 140 MG: 100 INJECTION, SOLUTION INTRAVENOUS at 14:34

## 2023-04-24 RX ADMIN — FENTANYL CITRATE 50 MCG: 50 INJECTION, SOLUTION INTRAMUSCULAR; INTRAVENOUS at 12:50

## 2023-04-24 RX ADMIN — SODIUM CHLORIDE, POTASSIUM CHLORIDE, SODIUM LACTATE AND CALCIUM CHLORIDE: 600; 310; 30; 20 INJECTION, SOLUTION INTRAVENOUS at 11:16

## 2023-04-24 RX ADMIN — LIDOCAINE HYDROCHLORIDE 40 MG: 20 INJECTION, SOLUTION EPIDURAL; INFILTRATION; INTRACAUDAL at 12:50

## 2023-04-24 RX ADMIN — DEXAMETHASONE SODIUM PHOSPHATE 8 MG: 4 INJECTION, SOLUTION INTRA-ARTICULAR; INTRALESIONAL; INTRAMUSCULAR; INTRAVENOUS; SOFT TISSUE at 13:05

## 2023-04-24 RX ADMIN — FENTANYL CITRATE 50 MCG: 50 INJECTION, SOLUTION INTRAMUSCULAR; INTRAVENOUS at 13:17

## 2023-04-24 RX ADMIN — ROCURONIUM BROMIDE 50 MG: 10 INJECTION, SOLUTION INTRAVENOUS at 12:50

## 2023-04-24 ASSESSMENT — PAIN SCALES - GENERAL: PAIN_LEVEL: 2

## 2023-04-24 NOTE — ANESTHESIA PREPROCEDURE EVALUATION
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          Relevant Problems   Other   (positive) Chronic lymphocytic leukemia (CLL), B-cell (HCC)       Physical Exam    Airway   Mallampati: II  TM distance: >3 FB  Neck ROM: full       Cardiovascular - normal exam  Rhythm: regular  Rate: normal  (-) murmur     Dental - normal exam           Pulmonary - normal exam  Breath sounds clear to auscultation     Abdominal    Neurological - normal exam                 Anesthesia Plan    ASA 3       Plan - general       Airway plan will be ETT          Induction: intravenous    Postoperative Plan: Postoperative administration of opioids is intended.    Pertinent diagnostic labs and testing reviewed    Informed Consent:    Anesthetic plan and risks discussed with patient.    Use of blood products discussed with: patient whom consented to blood products.

## 2023-04-24 NOTE — DISCHARGE INSTRUCTIONS
HOME CARE INSTRUCTIONS    ACTIVITY: Rest and take it easy for the first 24 hours.  A responsible adult is recommended to remain with you during that time.  It is normal to feel sleepy.  We encourage you to not do anything that requires balance, judgment or coordination.    FOR 24 HOURS DO NOT:  Drive, operate machinery or run household appliances.  Drink beer or alcoholic beverages.  Make important decisions or sign legal documents.    SPECIAL INSTRUCTIONS: Diet as tolerated, no strenuous activity.    DIET: To avoid nausea, slowly advance diet as tolerated, avoiding spicy or greasy foods for the first day.  Add more substantial food to your diet according to your physician's instructions.  Babies can be fed formula or breast milk as soon as they are hungry.  INCREASE FLUIDS AND FIBER TO AVOID CONSTIPATION.    SURGICAL DRESSING/BATHING:   Please leave dressing in place for 5 days on left ear. Follow up in office in 7-10 days for stitch removal.  Please do not get wound wet.    MEDICATIONS: Resume taking daily medication.  Take prescribed pain medication with food.  If no medication is prescribed, you may take non-aspirin pain medication if needed.  PAIN MEDICATION CAN BE VERY CONSTIPATING.  Take a stool softener or laxative such as senokot, pericolace, or milk of magnesia if needed.    A follow-up appointment should be arranged with your doctor; call Dr. Lino' office to schedule, if needed.    You should CALL YOUR PHYSICIAN if you develop:  Fever greater than 101 degrees F.  Pain not relieved by medication, or persistent nausea or vomiting.  Excessive bleeding (blood soaking through dressing) or unexpected drainage from the wound.  Extreme redness or swelling around the incision site, drainage of pus or foul smelling drainage.  Inability to urinate or empty your bladder within 8 hours.  Problems with breathing or chest pain.    You should call 911 if you develop problems with breathing or chest pain.  If you are  unable to contact your doctor or surgical center, you should go to the nearest emergency room or urgent care center.  Physician's telephone #: [phone]    MILD FLU-LIKE SYMPTOMS ARE NORMAL.  YOU MAY EXPERIENCE GENERALIZED MUSCLE ACHES, THROAT IRRITATION, HEADACHE AND/OR SOME NAUSEA.    If any questions arise, call your doctor.  If your doctor is not available, please feel free to call the Surgical Center at [phone].  The Center is open Monday through Friday from 7AM to 7PM.      A registered nurse may call you a few days after your surgery to see how you are doing after your procedure.    You may also receive a survey in the mail within the next two weeks and we ask that you take a few moments to complete the survey and return it to us.  Our goal is to provide you with very good care and we value your comments.     Depression / Suicide Risk    As you are discharged from this [facility] facility, it is important to learn how to keep safe from harming yourself.    Recognize the warning signs:  Abrupt changes in personality, positive or negative- including increase in energy   Giving away possessions  Change in eating patterns- significant weight changes-  positive or negative  Change in sleeping patterns- unable to sleep or sleeping all the time   Unwillingness or inability to communicate  Depression  Unusual sadness, discouragement and loneliness  Talk of wanting to die  Neglect of personal appearance   Rebelliousness- reckless behavior  Withdrawal from people/activities they love  Confusion- inability to concentrate     If you or a loved one observes any of these behaviors or has concerns about self-harm, here's what you can do:  Talk about it- your feelings and reasons for harming yourself  Remove any means that you might use to hurt yourself (examples: pills, rope, extension cords, firearm)  Get professional help from the community (Mental Health, Substance Abuse, psychological counseling)  Do not be  alone:Call your Safe Contact- someone whom you trust who will be there for you.  Call your local CRISIS HOTLINE 987-7767 or 489-564-5453  Call your local Children's Mobile Crisis Response Team [place] (071) 828-9749 or www.[facility]  Call the toll free National Suicide Prevention Hotlines   National Suicide Prevention Lifeline 863-937-ZQYQ (2685)  [facility] 800-SRVJSGX (937-4995)    I acknowledge receipt and understanding of these Home Care instructions.

## 2023-04-24 NOTE — ANESTHESIA POSTPROCEDURE EVALUATION
Patient: Wally Joel    Procedure Summary     Date: 04/24/23 Room / Location: [facility] ROOM 22 / SURGERY SAME DAY [facility]    Anesthesia Start: 1245 Anesthesia Stop: 1506    Procedure: PARTIAL AURICULECTOMY, LEFT EXCISION TUMOR SOFT TISSUE; NECK; DEEP; SUBFASCIAL, LEFT EXCISION, MALIGNANT LESION (10), EARS; (Left) Diagnosis: (squamous cell carcinoma of left pinna and adenopathy of neck)    Surgeons: Raza Lino M.D. Responsible Provider: Dexter Herrear M.D.    Anesthesia Type: general ASA Status: 3          Final Anesthesia Type: general  Last vitals  BP   Blood Pressure : 118/62    Temp   36 °C (96.8 °F)    Pulse   73   Resp   16    SpO2   97 %      Anesthesia Post Evaluation    Patient location during evaluation: PACU  Patient participation: complete - patient participated  Level of consciousness: awake and alert  Pain score: 2    Airway patency: patent  Anesthetic complications: no  Cardiovascular status: hemodynamically stable  Respiratory status: acceptable  Hydration status: euvolemic    PONV: none          No notable events documented.     Nurse Pain Score: 0 (NPRS)

## 2023-04-24 NOTE — OR SURGEON
Immediate Post OP Note    PreOp Diagnosis: 6 cm Left auricular SCCA, CLL, Bilateral cervical adenopathy       PostOp Diagnosis: same      Procedure(s):  PARTIAL AURICULECTOMY, LEFT EXCISION TUMOR SOFT TISSUE; NECK; DEEP; SUBFASCIAL, LEFT EXCISION, MALIGNANT LESION (10), EARS; - Wound Class: Clean Contaminated    Surgeon(s):  Rzaa Lino M.D.    Anesthesiologist/Type of Anesthesia:  Anesthesiologist: Dexter Herrera M.D./General    Surgical Staff:  Circulator: Maxine Nicole R.N.  Relief Circulator: Marielle Card R.N.; Chintan Cunningham R.N.  Scrub Person: JAVIER QuanNJOSE    Specimens removed if any:  ID Type Source Tests Collected by Time Destination   A : Left ear true superior margin Other Other PATHOLOGY SPECIMEN Raza Lino M.D. 4/24/2023 1006    B : Left ear mass. short stitch 12 oclock, medium stitch 9 oclock, long stitch 6 oclock Other Other PATHOLOGY SPECIMEN Raza Lino M.D. 4/24/2023 1349    C : Left ear inferior cartilage margin Other Other PATHOLOGY SPECIMEN Raza Lino M.D. 4/24/2023 1349    D : Left ear deep cartilage margin superior Other Other PATHOLOGY SPECIMEN Raza Lino M.D. 4/24/2023 1349    E : Left ear deep cartilage margin middle Other Other PATHOLOGY SPECIMEN Raza Lino M.D. 4/24/2023 1349    F : Left ear additional inferior margin Other Other PATHOLOGY SPECIMEN Raza Lino M.D. 4/24/2023 1351    G : Left post auricular mass Other Other PATHOLOGY SPECIMEN Raza Lino M.D. 4/24/2023 1432        Estimated Blood Loss: 200 cc    Findings: 6 cm gungating left auricualr mass, CLL, bilateral adenopathy.     Complications: none        4/24/2023 3:02 PM Raza Lino M.D.

## 2023-04-24 NOTE — ANESTHESIA PROCEDURE NOTES
Airway    Date/Time: 4/24/2023 12:51 PM  Performed by: Dexter Herrera M.D.  Authorized by: Dexter Herrera M.D.     Location:  OR  Urgency:  Elective  Indications for Airway Management:  Anesthesia      Spontaneous Ventilation: absent    Sedation Level:  Deep  Preoxygenated: Yes    Patient Position:  Sniffing  Final Airway Type:  Endotracheal airway  Final Endotracheal Airway:  ETT  Cuffed: Yes    Technique Used for Successful ETT Placement:  Direct laryngoscopy    Insertion Site:  Oral  Blade Type:  Leonardo  Laryngoscope Blade/Videolaryngoscope Blade Size:  3  ETT Size (mm):  8.0  Measured from:  Teeth  ETT to Teeth (cm):  23  Placement Verified by: auscultation and capnometry    Cormack-Lehane Classification:  Grade I - full view of glottis  Number of Attempts at Approach:  1

## 2023-04-24 NOTE — OR NURSING
1503- Pt to PACU from OR. Report from anesthesia and OR RN. On 6L O2 via mask. Respirations even and unlabored. VSS.      1530- Denies pain and nausea. Dressings CDI. Sipping water.    1600- Wally ambulated from rney to bathroom. Self cath x1, not measured. Dressing CDI on left ear. No drainage from chest wall incision.     1620- Discharge orders received. Meets discharge criteria at this time. Mild pain. No nausea. Tolerating PO. On RA. All lines and monitors discontinued. Reviewed discharge paperwork with Christine, Friend. Discussed diet, activity, medications, follow up care and worsening symptoms. No questions at this time. Pt to be discharged to home via Christine.     1630- Pt escorted out of department in wheelchair with all belongings. Discharged home to responsible adult.

## 2023-04-24 NOTE — ANESTHESIA TIME REPORT
Anesthesia Start and Stop Event Times     Date Time Event    4/24/2023 1205 Ready for Procedure     1245 Anesthesia Start     1506 Anesthesia Stop        Responsible Staff  04/24/23    Name Role Begin End    Dexter Herrera M.D. Anesth 1245 1508        Overtime Reason:  overtime    Comments:

## 2023-04-25 NOTE — OP REPORT
DATE OF SERVICE:  04/24/2023     PREOPERATIVE DIAGNOSES:  1.  Squamous cell carcinoma, left auricle.  2.  Bilateral cervical adenopathy.     POSTOPERATIVE DIAGNOSES:  1.  Squamous cell carcinoma, left auricle.  2.  Bilateral cervical adenopathy.     PROCEDURES:  1.  Excision, 6-cm, left auricular squamous cell carcinoma.  2.  Incisional biopsy, left cervical lymph node.     SURGEON:  Raza Lino MD     ANESTHESIOLOGIST:  Dexter Herrera MD     INDICATIONS:  The patient is an 82-year-old with a history of CLL.  The   patient developed a lesion of his left auricle 5 months ago.  It has now grown   to approximately 6 cm.  It is mostly arising from the posterior aspect of the   pinna that wraps around over the helix anteriorly.  It does not extend   through the skin in the side of the neck or skull.  The patient also has   multiple lymph nodes, which he has had for 1-2 years.  He has one overlying   the inferior aspect of the mastoid, which will be excised to try to determine   whether or not this is a squamous cell carcinoma or lymph nodes related to the   CLL.     DESCRIPTION OF PROCEDURE:  The patient was taken to the operating room and   placed in the supine position.  General anesthesia was induced and the patient   was easily intubated.  A 1% lidocaine with 1:100,000 epinephrine was used to   infiltrate circumferentially around the very large lesion of the left auricle.    Pictures were taken.  The patient was then prepped and draped in the usual   sterile fashion.  An incision was made first superiorly, a centimeter or so   onto the skin of the scalp above the attachment of the inferior aspect of the   helix to the side of the head.  The incision was then brought down 5-6 mm   anterior to the lesion.  The incision was then brought down inferiorly onto   normal-appearing skin 7-8 mm from the edge of the lesion along the anterior   aspect of the pinna.  The extension was extended postauricularly posterior  to   the postauricular sulcus behind the lesion as well by 7-8 mm.  The incision   was carried inferiorly to well below the lesion.  Dissection then proceeded   including cartilage, which was on the deep surface of the lesion.  A plane   was, however, developed anteriorly lifting what appeared to be normal skin off   the underlying cartilage with the cartilage as the deep margin of again what   was mostly posterior lesion by preserving the skin to aid in closure.  The   resection proceeded as was necessary to completely remove the lesion.  It was   marked at 12 o'clock with a short stitch, 9 o'clock, which would represent the   posterior margin and at 6 o'clock representing the inferior margin and sent   to pathology for permanent section.  It should be noted that there was   breakdown of the lesion in the mid portion just deep to the cartilage, but   that the cartilage represented the actual deep margin.  A number of additional   margins were taken including a superior middle and inferior cartilaginous   margin, which again was underlying the lesion and then a superior margin and   inferior margin for frozen section.  Both of these were negative.  An   additional small strip of auricular tissue was taken to try to even the   contour of the resection making the ear look somewhat more normal.  Hemostasis   was obtained.  There was a fair amount of bleeding from the skin edges.  The   wound was then closed in layers with 4-0 Vicryl and a 5-0 running nylon.  This   was done after irrigation.  A small quarter-inch Penrose drain that had been   reduced in size was placed as well and secured with 5-0 nylon to the skin.    The lymph node was then palpated.  It was just posterior to the lobule of the   ear.  An incision was made directly over it.  There was significant bleeding   from the skin edges.  Dissection proceeded into the node, but it was necrotic   and broke down easily.  Fragments of it were removed.  The wound  was irrigated   and then hemostasis obtained with a paddle tip Bovie cautery.  A small piece   of Fibrillar Surgicel was then placed into the wound and then wound closed   with a running 5-0 nylon.  The patient's skin was then cleansed and a   Anasco dressing applied with pressure to facilitate healing.  The patient   was then awakened and taken to the recovery room in stable condition.  He   tolerated the procedure well and there were no complications.  Estimated blood   loss was 200 mL.        ______________________________  MD EBEN BAHENA/WILBUR/SHILOH    DD:  04/25/2023 07:55  DT:  04/25/2023 08:44    Job#:  [id number]    CC:Florentino Ruth III, MD

## 2023-05-05 ENCOUNTER — HOSPITAL ENCOUNTER (OUTPATIENT)
Facility: MEDICAL CENTER | Age: 83
End: 2023-05-05
Attending: NURSE PRACTITIONER
Payer: MEDICARE

## 2023-05-05 LAB
ALBUMIN SERPL BCP-MCNC: 3.9 G/DL (ref 3.2–4.9)
ALBUMIN/GLOB SERPL: 1.9 G/DL
ALP SERPL-CCNC: 141 U/L (ref 30–99)
ALT SERPL-CCNC: 17 U/L (ref 2–50)
ANION GAP SERPL CALC-SCNC: 11 MMOL/L (ref 7–16)
AST SERPL-CCNC: 15 U/L (ref 12–45)
BILIRUB SERPL-MCNC: 0.2 MG/DL (ref 0.1–1.5)
BUN SERPL-MCNC: 25 MG/DL (ref 8–22)
CALCIUM ALBUM COR SERPL-MCNC: 8.8 MG/DL (ref 8.5–10.5)
CALCIUM SERPL-MCNC: 8.7 MG/DL (ref 8.5–10.5)
CHLORIDE SERPL-SCNC: 104 MMOL/L (ref 96–112)
CO2 SERPL-SCNC: 22 MMOL/L (ref 20–33)
CREAT SERPL-MCNC: 1.44 MG/DL (ref 0.5–1.4)
GFR SERPLBLD CREATININE-BSD FMLA CKD-EPI: 49 ML/MIN/1.73 M 2
GLOBULIN SER CALC-MCNC: 2.1 G/DL (ref 1.9–3.5)
GLUCOSE SERPL-MCNC: 129 MG/DL (ref 65–99)
HBV CORE AB SERPL QL IA: NONREACTIVE
HBV SURFACE AB SERPL IA-ACNC: <3.5 MIU/ML (ref 0–10)
HBV SURFACE AG SER QL: NORMAL
LDH SERPL L TO P-CCNC: 135 U/L (ref 107–266)
POTASSIUM SERPL-SCNC: 4.5 MMOL/L (ref 3.6–5.5)
PROT SERPL-MCNC: 6 G/DL (ref 6–8.2)
SODIUM SERPL-SCNC: 137 MMOL/L (ref 135–145)
URATE SERPL-MCNC: 6.2 MG/DL (ref 2.5–8.3)

## 2023-05-05 PROCEDURE — 80053 COMPREHEN METABOLIC PANEL: CPT

## 2023-05-05 PROCEDURE — 86706 HEP B SURFACE ANTIBODY: CPT | Mod: GA

## 2023-05-05 PROCEDURE — 87340 HEPATITIS B SURFACE AG IA: CPT | Mod: GA

## 2023-05-05 PROCEDURE — 86704 HEP B CORE ANTIBODY TOTAL: CPT | Mod: GA

## 2023-05-05 PROCEDURE — 83615 LACTATE (LD) (LDH) ENZYME: CPT

## 2023-05-05 PROCEDURE — 84550 ASSAY OF BLOOD/URIC ACID: CPT

## 2023-05-24 ENCOUNTER — PATIENT OUTREACH (OUTPATIENT)
Dept: OTHER | Facility: MEDICAL CENTER | Age: 83
End: 2023-05-24
Payer: MEDICARE

## 2023-05-24 NOTE — PROGRESS NOTES
Oncology Nurse Navigation   Phoned pt for follow up.  Pt is scheduled for consultation with Dr. Tay on 6/6/23.  He states he has three appointments in town that day.  Pt is retired and lives alone in [place].  His friend is assisting with transportation.  He states he is waiting for his first delivery of chemotherapy today.  Encouraged pt to contact oncologist's office if he does not receive it.  Pt agrees.  Referral placed to Oncology Social Worker to discuss potential resources for transportation.

## 2023-05-25 ENCOUNTER — PATIENT OUTREACH (OUTPATIENT)
Dept: ONCOLOGY | Facility: MEDICAL CENTER | Age: 83
End: 2023-05-25
Payer: MEDICARE

## 2023-05-25 NOTE — PROGRESS NOTES
"On May 25, 2023, Oncology Social Worker Krista Herrera contacted pt. via telephone to follow up on referral from Oncology Nurse Navigator as pt. lives in [place].  Pt. shared he is \"fine.\"  Pt. shared he has a ride to his appointment on June 6th, 2023 with Dr. Tay.  Pt. shared his friend from [place] will be taking him to [place] from [place] and going home afterwards.  Pt. shared they are planning on leaving at 4 am and will get home around 8 pm.  ISSAC Herrera spoke to pt. about [employer] maia.  Pt. shared he is interested depending on what his treatment entails.  ISSAC Herrera informed pt. she would leave the [employer] maia application at the [facility]  for pt. to .  Pt. agreed and thanked ISSAC Herrera for her call.    "

## 2023-06-02 ENCOUNTER — HOSPITAL ENCOUNTER (OUTPATIENT)
Dept: RADIOLOGY | Facility: MEDICAL CENTER | Age: 83
End: 2023-06-02
Attending: INTERNAL MEDICINE
Payer: MEDICARE

## 2023-06-02 ENCOUNTER — HOSPITAL ENCOUNTER (OUTPATIENT)
Dept: RADIOLOGY | Facility: MEDICAL CENTER | Age: 83
End: 2023-06-02
Payer: MEDICARE

## 2023-06-02 ENCOUNTER — HOSPITAL ENCOUNTER (OUTPATIENT)
Dept: RADIOLOGY | Facility: MEDICAL CENTER | Age: 83
End: 2023-06-02
Attending: SURGERY
Payer: MEDICARE

## 2023-06-06 ENCOUNTER — HOSPITAL ENCOUNTER (OUTPATIENT)
Dept: RADIATION ONCOLOGY | Facility: MEDICAL CENTER | Age: 83
End: 2023-06-30
Attending: RADIOLOGY
Payer: MEDICARE

## 2023-06-06 VITALS
DIASTOLIC BLOOD PRESSURE: 64 MMHG | OXYGEN SATURATION: 94 % | HEIGHT: 63 IN | BODY MASS INDEX: 27.15 KG/M2 | TEMPERATURE: 97.6 F | WEIGHT: 153.22 LBS | SYSTOLIC BLOOD PRESSURE: 119 MMHG | HEART RATE: 78 BPM

## 2023-06-06 DIAGNOSIS — C44.229 SCC (SQUAMOUS CELL CARCINOMA), EAR, LEFT: ICD-10-CM

## 2023-06-06 PROCEDURE — 99205 OFFICE O/P NEW HI 60 MIN: CPT | Performed by: RADIOLOGY

## 2023-06-06 PROCEDURE — 99214 OFFICE O/P EST MOD 30 MIN: CPT | Performed by: RADIOLOGY

## 2023-06-06 RX ORDER — ZANUBRUTINIB 80 MG/1
80 CAPSULE, GELATIN COATED ORAL 2 TIMES DAILY
COMMUNITY
Start: 2023-05-19

## 2023-06-06 ASSESSMENT — PAIN SCALES - GENERAL: PAINLEVEL: NO PAIN

## 2023-06-06 ASSESSMENT — FIBROSIS 4 INDEX: FIB4 SCORE: 1.81

## 2023-06-06 NOTE — CONSULTS
RADIATION ONCOLOGY CONSULT    Patient name:  Wally Joel    Primary Physician:  Pcp Pt States None MRN: [id number]  [facility]: [id number]   Referring physician:  Jose R Wright M.D.  : 1940, 83 y.o.     DATE OF SERVICE: 2023    IDENTIFICATION: A 83 y.o. male with   SCC (squamous cell carcinoma), ear, left  Staging form: Cutaneous Carcinoma of the Head and Neck, AJCC 8th Edition.  - Pathologic stage from 2023: Stage III (pT3, pN1, cM0) - Signed by Lupe PATEL M.D. on 2023  Histopathologic type: Squamous cell carcinoma, NOS  Histologic grade (G): G3  Histologic grading system: 4 grade system  Residual tumor (R): R0 - None  Laterality: Left  Lymph-vascular invasion (LVI): LVI not present (absent)/not identified  Extraosseous extension: Absent        He is here at the kind request of Jose R Swan M.D.        HISTORY OF PRESENT ILLNESS:  Subjective     83-year-old retired [employer] .  He lives in [facility].  He presented in April with a rapidly enlarging mass involving the skin of the pinna of the left ear.  This is in the setting of CLL.  He had a biopsy that demonstrated squamous cell carcinoma.  He was initially seen by plastic surgery who noted a 6 cm exophytic mass occupying approximately 30% of the pinna extending towards the junction of the pinna side of head.  He was subsequently referred to ENT and Dr. Lino.  Underwent on 2023 excision of the left auricular mass as well as incisional biopsy of left cervical lymph node.  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  Resected postauricular mass demonstrated metastatic squamous cell carcinoma. involving a lymph node which also was involved with patient's known CLL.  Lymph node specimen was fragmented and therefore extranodal extension was indeterminate.    He is now 6 weeks postop.   He has recurrent ulcerative bleeding mass involving the left postauricular region.  He denies pain.  He does report bruising and bleeding.  He  currently has it bandaged.    He also 1 week ago started Zanubrutinib for CLL.        PROBLEM LIST:  Patient Active Problem List   Diagnosis    Chronic lymphocytic leukemia (CLL), B-cell (HCC)    Hypertrophy of prostate with urinary obstruction and other lower urinary tract symptoms (LUTS)    Incisional hernia    Right inguinal hernia    UTI (urinary tract infection)    Enlarged prostate    SCC (squamous cell carcinoma), ear, left        PAST SURGICAL HISTORY:  Past Surgical History:   Procedure Laterality Date    EXCISION, LESION, HEAD AND NECK REGION Left 4/24/2023    Procedure: PARTIAL AURICULECTOMY, LEFT EXCISION TUMOR SOFT TISSUE; NECK; DEEP; SUBFASCIAL, LEFT EXCISION, MALIGNANT LESION (10), EARS;;  Surgeon: Raza Lino M.D.;  Location: SURGERY SAME DAY [facility];  Service: Ent    [place] EXPLORATORY OF ABDOMEN N/A 11/20/2020    Procedure: LAPAROTOMY, EXPLORATORY, PSB BOWEL REPAIR;  Surgeon: Pili Galicia M.D.;  Location: SURGERY [facility];  Service: General    VENTRAL HERNIA REPAIR N/A 11/20/2020    Procedure: REPAIR, HERNIA, VENTRAL;  Surgeon: Pili Galicia M.D.;  Location: SURGERY [facility];  Service: General    CYSTOSCOPY  3/16/2018    Procedure: CYSTOSCOPY- LITHOPAXY;  Surgeon: Rui Tang M.D.;  Location: [facility];  Service: Urology    INGUINAL HERNIA LAPAROSCOPIC  3/1/2017    Procedure: INGUINAL HERNIA LAPAROSCOPIC RT AND INCISIONAL WITH MESH;  Surgeon: Kory Suero M.D.;  Location: SURGERY [facility];  Service:     CYSTOSCOPY  10/29/2014    Performed by Rui Tang M.D. at [facility]    TRANS URETHRAL RESECTION PROSTATE  10/29/2014    Performed by Rui Tang M.D. at [facility]    OTHER ABDOMINAL SURGERY  2010    ventral hernia    OTHER ORTHOPEDIC SURGERY  1988, 1989    knee scope x 2       CURRENT  "MEDICATIONS:  Current Outpatient Medications   Medication Sig Dispense Refill    Degarelix Acetate (FIRMAGON, 240 MG DOSE, SC) Inject  under the skin every 6 months. Pt unkn dose      BRUKINSA 80 MG Cap Take 80 mg by mouth 2 times a day.      Leuprolide Acetate, 6 Month, (LUPRON DEPOT, 6-MONTH, IM) Inject 45 mg into the shoulder, thigh, or buttocks.       No current facility-administered medications for this encounter.       ALLERGIES:    Ciprofloxacin    FAMILY HISTORY:    family history includes Liver Cancer in his father; Pancreatic Cancer in his sister.    SOCIAL HISTORY:     reports that he has never smoked. He has never used smokeless tobacco. He reports current alcohol use of about 0.6 oz of alcohol per week. He reports that he does not use drugs. He states he lives alone in [place]. He is retired from the [employer].     REVIEW OF SYSTEMS:    A complete review of systems taken. Pertinent items in HPI. All others negative.    PHYSICAL EXAM:    PERFORMANCE STATUS:      6/6/2023     3:12 PM   ECOG Performance Review   ECOG Performance Status Fully active, able to carry on all pre-disease performance without restriction         6/6/2023     3:12 PM   Karnofsky Score   Karnofsky Score 90     /64 (BP Location: Left arm)   Pulse 78   Temp 36.4 °C (97.6 °F)   Ht 1.6 m (5' 3\")   Wt 69.5 kg (153 lb 3.5 oz)   SpO2 94%   BMI 27.14 kg/m²   Physical Exam  Constitutional:       General: He is not in acute distress.     Appearance: Normal appearance.   HENT:      Head: Normocephalic and atraumatic.      Comments: 4 x 2-1/2 cm ulcerating exophytic mass left postauricular region abutting the the auricle of the left ear.     Mouth/Throat:      Mouth: Mucous membranes are moist.      Pharynx: Oropharynx is clear. No oropharyngeal exudate or posterior oropharyngeal erythema.   Eyes:      Extraocular Movements: Extraocular movements intact.      Conjunctiva/sclera: Conjunctivae normal.      Pupils: Pupils are " equal, round, and reactive to light.   Cardiovascular:      Rate and Rhythm: Normal rate and regular rhythm.      Heart sounds: Normal heart sounds.   Pulmonary:      Effort: Pulmonary effort is normal.      Breath sounds: Normal breath sounds.   Abdominal:      General: Abdomen is flat.      Palpations: Abdomen is soft.   Musculoskeletal:         General: Normal range of motion.      Cervical back: Normal range of motion and neck supple.   Lymphadenopathy:      Cervical: Cervical adenopathy (Small shotty lymph nodes palpable bilateral neck) present.   Skin:     General: Skin is dry.      Findings: No erythema.   Neurological:      General: No focal deficit present.      Mental Status: He is alert.      Cranial Nerves: No cranial nerve deficit.      Sensory: No sensory deficit.   Psychiatric:         Mood and Affect: Mood normal.         Behavior: Behavior normal.         Thought Content: Thought content normal.         Judgment: Judgment normal.          LABORATORY DATA:   Lab Results   Component Value Date/Time    WBC 62.3 (HH) 04/24/2023 11:17 AM    RBC 4.19 (L) 04/24/2023 11:17 AM    HEMOGLOBIN 10.5 (L) 04/24/2023 11:17 AM    HEMATOCRIT 34.6 (L) 04/24/2023 11:17 AM    MCV 82.6 04/24/2023 11:17 AM    MCH 25.1 (L) 04/24/2023 11:17 AM    MCHC 30.3 (L) 04/24/2023 11:17 AM    RDW 50.0 04/24/2023 11:17 AM    PLATELETCT 165 04/24/2023 11:17 AM    MPV 10.9 04/24/2023 11:17 AM    NEUTSPOLYS 9.40 (L) 04/24/2023 11:17 AM    LYMPHOCYTES 90.60 (H) 04/24/2023 11:17 AM    MONOCYTES 0.00 04/24/2023 11:17 AM    EOSINOPHILS 0.00 04/24/2023 11:17 AM    BASOPHILS 0.00 04/24/2023 11:17 AM    HYPOCHROMIA 1+ 04/24/2023 11:17 AM    ANISOCYTOSIS 2+ (A) 04/24/2023 11:17 AM      Lab Results   Component Value Date/Time    SODIUM 137 05/05/2023 01:50 PM    POTASSIUM 4.5 05/05/2023 01:50 PM    CHLORIDE 104 05/05/2023 01:50 PM    CO2 22 05/05/2023 01:50 PM    GLUCOSE 129 (H) 05/05/2023 01:50 PM    BUN 25 (H) 05/05/2023 01:50 PM    CREATININE  1.44 (H) 05/05/2023 01:50 PM           RADIOLOGY DATA:  No results found.    IMPRESSION:    A 83 y.o. with  SCC (squamous cell carcinoma), ear, left  Staging form: Cutaneous Carcinoma of the Head and Neck, AJCC 8th Edition.  - Pathologic stage from 6/6/2023: Stage III (pT3, pN1, cM0) - Signed by Lupe PATEL M.D. on 6/6/2023  Histopathologic type: Squamous cell carcinoma, NOS  Histologic grade (G): G3  Histologic grading system: 4 grade system  Residual tumor (R): R0 - None  Laterality: Left  Lymph-vascular invasion (LVI): LVI not present (absent)/not identified  Extraosseous extension: Absent        RECOMMENDATIONS:   83-year-old male with rapid enlarging squamous cell carcinoma involving the pinna of the left ear in the setting of CLL.  He is status post resection of the left ear mass with clear margins but evidence of perineural invasion.  He has a left postauricular lymph node involved with both CLL and metastatic squamous cell carcinoma.  He is 6 weeks postop but appears to have recurrence in the left postauricular region.  He is started therapy for CLL and reports some reduction in the size of his lymph nodes in his neck.    I recommended radiotherapy to treat the surgical bed of the pinna of the left ear as well regional nodes including what appears to be recurrent disease involving the left postauricular region.  The radiotherapy will involve delivering 6600 to 7000 cGy to the gross disease and 4451-4194 to subclinical disease.  Treatment will take 6 to 6-1/2 weeks.    IMRT will be utilized to spare normal structures.  The technical aspects benefits risks were reviewed with the patient.  He understands and wishes to proceed.  He will return for simulation tomorrow June 7, 2023 with treatment anticipated to start June 19, 2023.  Have put in touch with nurse navigation as well as social work to arrange for housing while undergoing therapy.    Thank you for the opportunity to participate in his care.  If  any questions or comments, please do not hesitate in calling.    Orders Placed This Encounter    REFERRAL TO ONCOLOGY NURSE NAVIGATOR    Referral to Oncology     Referral to Oncology Nutrition Services    REFERRAL TO ONCOLOGY NURSE NAVIGATOR    Degarelix Acetate (FIRMAGON, 240 MG DOSE, SC)    BRUKINSA 80 MG Cap

## 2023-06-06 NOTE — CT SIMULATION
PATIENT NAME Wally Joel   PRIMARY PHYSICIAN Pcp Pt States None [id number]   REFERRING PHYSICIAN Jose R Wright M.D. 1940     SCC (squamous cell carcinoma), ear, left  Staging form: Cutaneous Carcinoma of the Head and Neck, AJCC 8th Edition.  - Pathologic stage from 6/6/2023: Stage III (pT3, pN1, cM0) - Signed by Lupe PATEL M.D. on 6/6/2023  Histopathologic type: Squamous cell carcinoma, NOS  Histologic grade (G): G3  Histologic grading system: 4 grade system  Residual tumor (R): R0 - None  Laterality: Left  Lymph-vascular invasion (LVI): LVI not present (absent)/not identified  Extraosseous extension: Absent         Treatment Planning CT Simulation        Order Questions       Question Answer    Is this for a new course of treatment? Yes    Is this an Addendum? No    Implanted Device/Pacemaker No    Simulation Status Initial    Planned Start Date 6/19/2023    Treatment Site Neck    Laterality Left    Treatment Technique IMRT    Treatment Pattern/Frequency Daily    Number of fractions: 30-33    Concurrent Chemotherapy No    CT Technique 3D    Slice Thickness 2mm    Scan Extent H&N    Contrast IV    IV Contrast Volume Other    Volume (cc) 100    Bowel Preparation No    Treatment Device(s) S-Frame Mask     Shoulder Pulls     Bolus 1.0 cm    Patient Attire Gown    Patient Position Supine    Patient Orientation Head First    Arm Position Down    Dentures Out    Chin Position Neutral    Treatment Machine No preference    Treatment Image Guidance CBCT    Frequency (CBCT) Daily    Image Guidance Match Bone    In Vivo Dosimetry TLD    Other Orders Weekly Physics Check    Release to patient Immediate

## 2023-06-06 NOTE — PROGRESS NOTES
"Patient was seen today in clinic with Dr. Tay for consult.  Vitals signs and weight were obtained and pain assessment was completed.  Allergies and medications were reviewed with the patient.       Vitals/Pain:  Vitals:    06/06/23 1504   BP: 119/64   BP Location: Left arm   Pulse: 78   Temp: 36.4 °C (97.6 °F)   SpO2: 94%   Weight: 69.5 kg (153 lb 3.5 oz)   Height: 1.6 m (5' 3\")   Pain Score: No pain        Allergies:   Ciprofloxacin    Current Medications:  Current Outpatient Medications   Medication Sig Dispense Refill    Degarelix Acetate (FIRMAGON, 240 MG DOSE, SC) Inject  under the skin every 6 months. Pt unkn dose      BRUKINSA 80 MG Cap Take 80 mg by mouth 2 times a day.      Leuprolide Acetate, 6 Month, (LUPRON DEPOT, 6-MONTH, IM) Inject 45 mg into the shoulder, thigh, or buttocks.       No current facility-administered medications for this encounter.           Lizzy Miller R.N.   "

## 2023-06-07 ENCOUNTER — HOSPITAL ENCOUNTER (OUTPATIENT)
Dept: RADIATION ONCOLOGY | Facility: MEDICAL CENTER | Age: 83
End: 2023-06-07

## 2023-06-07 ENCOUNTER — DOCUMENTATION (OUTPATIENT)
Dept: RADIATION ONCOLOGY | Facility: MEDICAL CENTER | Age: 83
End: 2023-06-07
Payer: MEDICARE

## 2023-06-07 PROCEDURE — 77263 THER RADIOLOGY TX PLNG CPLX: CPT | Performed by: RADIOLOGY

## 2023-06-07 PROCEDURE — 77334 RADIATION TREATMENT AID(S): CPT | Performed by: RADIOLOGY

## 2023-06-07 PROCEDURE — 77334 RADIATION TREATMENT AID(S): CPT | Mod: 26 | Performed by: RADIOLOGY

## 2023-06-07 PROCEDURE — 77290 THER RAD SIMULAJ FIELD CPLX: CPT | Performed by: RADIOLOGY

## 2023-06-07 NOTE — RADIATION PLANNING NOTES
Clinical Treatment Planning Note    DATE OF SERVICE: 6/7/2023    DIAGNOSIS:  SCC (squamous cell carcinoma), ear, left  Staging form: Cutaneous Carcinoma of the Head and Neck, AJCC 8th Edition.  - Pathologic stage from 6/6/2023: Stage III (pT3, pN1, cM0) - Signed by Lupe PATEL M.D. on 6/6/2023  Histopathologic type: Squamous cell carcinoma, NOS  Histologic grade (G): G3  Histologic grading system: 4 grade system  Residual tumor (R): R0 - None  Laterality: Left  Lymph-vascular invasion (LVI): LVI not present (absent)/not identified  Extraosseous extension: Absent         IMAGING REVIEWED:  [x] CT     [] MRI     [] PET/CT     [] BONE SCAN     [] MAMMO     [] OTHER      TREATMENT INTENT:   [x] CURATIVE     [] MAINTENANCE     []  PALLIATIVE      []  SUPPORTIVE     []  PROPHYLACTIC     [] BENIGN     []  CONSOLIDATIVE      [] DEFINITIVE   []  OLOGIMETASTATIC      LINE OF TREATMENT:  [x] ADJUVANT   [] DEFINITIVE   [] NEOADJUVANT   [] RE-TREATMENT      TECHNIQUE PLANNED:  [x] IMRT   [] 3D   [] SBRT   [] SRS/SRT   [] HDR   [] ELECTRON       IMRT JUSTIFICATION:  []   An immediately adjacent area has been previously irradiated and abutting portals must be established with high precision.    []  Dose escalation is planned to deliver radiation doses in excess of those commonly utilized for similar tumors with conventional treatment.    []  The target volume is concave or convex, and the critical normal tissues are within or around that convexity or concavity.    [x]  The target volume is in close proximity to critical structures that must be protected.    []  The volume of interest must be covered with narrow margins to adequately protect  immediately adjacent structures.      FIELDS & BLOCKING:  [] COMPLEX BLOCKS     []  = 3 TX AREAS     [x]  ARCS     []  CUSTOM SHEILD        []  SIMPLE BLOCK      CHEMOTHERAPY:  []  CONCURRENT     []  INDUCTION     [] SEQUENTIAL     []  <30 DAYS FROM XRT      NOTES:  OAR CONSTRAINTS:  (GUIDELINES ONLY NOT ABSOLUTE)  Target Prescribed Coverage   PTV1 95% of PTV1 covered by 100% (cGy) of RX Dose     PTV1 99% of PTV1 covered by 93% (cGy) of RX Dose       CHARITY Goal   Any volume (1cc) outside PTV Max Dose < 74Gy   Plan Hot Spot Max Dose < 110%   Cord  Max Dose < 45Gy   Cord + 0.5cm Max Dose < 50Gy   Brainstem Max Dose < 52 Gy   Brainstem + 0.5cm                           (0.3cm w/ Dr. Mary.) Max Dose < 52Gy   Oral Pharynx  Mean Dose < 45Gy    Oral Cavity Mean Dose < 30Gy   R Parotid  Mean Dose < 23Gy    L Parotid  Mean Dose < 23Gy    Cervical Esophagus Mean Dose < 30Gy   Contralateral Submandibular gland (not target)  Max Dose < 39Gy   Optic Chiasm Max Dose < 54Gy   R Optic Nerve Max Dose < 54Gy   L Optic Nerve Max Dose < 54Gy   R Eye Max Dose < 35Gy   L Eye Max Dose < 35Gy   R Lens Max Dose < 10Gy   L Lens Max Dose < 10Gy   Inner Ear Mean Dose < 50Gy    Mandible Goal - 1cc Max Dose < 70Gy   *RTOG 1016, RTOG 0225

## 2023-06-07 NOTE — PROGRESS NOTES
Nutrition Services: [place] [facility] Referral  Wally Joel is a 83 y.o. male with diagnosis of SCC  . Referral received for nutrition services. Due to high volume of referrals, please allow for up to 14 days for initial RD consultation per policy. RD will attempt to see at next oncology appointment or will contact per policy for nutrition assessment.    Please contact as needed.  [phone]

## 2023-06-07 NOTE — RADIATION PLANNING NOTES
DATE OF SERVICE: 6/7/2023    DIAGNOSIS:  SCC (squamous cell carcinoma), ear, left  Staging form: Cutaneous Carcinoma of the Head and Neck, AJCC 8th Edition.  - Pathologic stage from 6/6/2023: Stage III (pT3, pN1, cM0) - Signed by Lupe PATEL M.D. on 6/6/2023  Histopathologic type: Squamous cell carcinoma, NOS  Histologic grade (G): G3  Histologic grading system: 4 grade system  Residual tumor (R): R0 - None  Laterality: Left  Lymph-vascular invasion (LVI): LVI not present (absent)/not identified  Extraosseous extension: Absent       DATE OF SERVICE: 6/7/2023    TYPE OF SIMULATION: Head & Neck    GOAL OF TREATMENT:   [x] Curative  [] Palliative  [] Oligometastatic    CONTRAST:    [x] IV Contrast*  [] Oral Contrast               POSITION:    [x]  Supine  [] Prone     COMPLEX:  [] Complex Blocking   [x]Arcs  [] Custom Blocks  [] >3 Sites    PROCEDURE: Patient place in supine position on CT table with head in neutral position. Dentures removed. Shoulder pulls used to stabilize shoulders. Patient head and shoulders were immobilized with a thermoplastic mask.   films evaluated to ensure proper patient position.  CT obtained through entire volume of interest. Exam pushed to treatment planning system for contouring and planning.    I have personally reviewed the relevant data, performed the target localization, and determined all relevant factors for this patient’s simulation.    *Omnipaque 80 -100cc IVP in conjunction with 500cc NS

## 2023-06-08 ENCOUNTER — TELEPHONE (OUTPATIENT)
Dept: RADIATION ONCOLOGY | Facility: MEDICAL CENTER | Age: 83
End: 2023-06-08
Payer: MEDICARE

## 2023-06-08 ENCOUNTER — PATIENT OUTREACH (OUTPATIENT)
Dept: ONCOLOGY | Facility: MEDICAL CENTER | Age: 83
End: 2023-06-08
Payer: MEDICARE

## 2023-06-08 NOTE — PROGRESS NOTES
On June 8, 2023, Oncology Social Worker Krista Herrera contacted pt. via telephone after receiving voicemail message from pt. informing ISSAC Herrera he would need to stay the entire duration of his treatment as he cannot find a ride each week to bring him from [place], [place] for his daily radiation treatment for six weeks.  ISSAC Herrera asked pt. if he picked up [employer] Lodging maia application she left for him at [employer].  Pt. shared he did not receive it. ISSAC Herrera explained she would mail him the application again and asked that pt. bring the completed form when he starts Radiation treatment.  Pt. agreed to do so.  ISSAC Herrera informed pt. she would make the reservations and call him back.

## 2023-06-08 NOTE — PROGRESS NOTES
On June 8, 2023, Oncology Social Worker Krista Herrera attempted telephone contact with pt.  OSW Javier left voicemail message for pt. requesting pt. call back at earliest convenience re: lodging arrangements.  OSW Javier left contact information in voicemail message.

## 2023-06-08 NOTE — PROGRESS NOTES
On June 8, 2023, Oncology Social Worker Krista Herrera contacted pt. back via telephone to inform him she had made his reservations for the [facility] to check in on June 18th, 2023 and check out August 3rd, 2023 Reservation #[id number].  Pt. thanked ISSAC Herrera for her help and shared he would be able to pay for his food during his stay.  ISSAC Herrera encouraged pt. to contact her if he is not able to do so in order for ISSAC Herrera to provide him additional resources.  Pt. agreed.      ISSAC Herrera mailed pt. [employer] Lodging maia application and confirmation of stay.

## 2023-06-08 NOTE — TELEPHONE ENCOUNTER
Nutrition Services: Telephone Encounter  Consult received for nutrition assessment.  Called patient to set up appointment but Wally did not answer.  RD left a voice message with contact information.      Please have patient contact RD: [phone]    RD will attempt to follow-up

## 2023-06-15 PROCEDURE — 77333 RADIATION TREATMENT AID(S): CPT | Mod: 26,XU | Performed by: RADIOLOGY

## 2023-06-15 PROCEDURE — 77300 RADIATION THERAPY DOSE PLAN: CPT | Performed by: RADIOLOGY

## 2023-06-15 PROCEDURE — 77333 RADIATION TREATMENT AID(S): CPT | Mod: XU | Performed by: RADIOLOGY

## 2023-06-15 PROCEDURE — 77338 DESIGN MLC DEVICE FOR IMRT: CPT | Mod: 26 | Performed by: RADIOLOGY

## 2023-06-15 PROCEDURE — 77338 DESIGN MLC DEVICE FOR IMRT: CPT | Performed by: RADIOLOGY

## 2023-06-15 PROCEDURE — 77301 RADIOTHERAPY DOSE PLAN IMRT: CPT | Mod: 26 | Performed by: RADIOLOGY

## 2023-06-15 PROCEDURE — 77300 RADIATION THERAPY DOSE PLAN: CPT | Mod: 26 | Performed by: RADIOLOGY

## 2023-06-15 PROCEDURE — 77301 RADIOTHERAPY DOSE PLAN IMRT: CPT | Performed by: RADIOLOGY

## 2023-06-19 ENCOUNTER — HOSPITAL ENCOUNTER (OUTPATIENT)
Dept: RADIATION ONCOLOGY | Facility: MEDICAL CENTER | Age: 83
End: 2023-06-19
Payer: MEDICARE

## 2023-06-19 LAB
CHEMOTHERAPY INFUSION START DATE: NORMAL
CHEMOTHERAPY RECORDS: 2
CHEMOTHERAPY RECORDS: 6000
CHEMOTHERAPY RECORDS: NORMAL
CHEMOTHERAPY RX CANCER: NORMAL
DATE 1ST CHEMO CANCER: NORMAL
RAD ONC ARIA COURSE LAST TREATMENT DATE: NORMAL
RAD ONC ARIA COURSE TREATMENT ELAPSED DAYS: NORMAL
RAD ONC ARIA REFERENCE POINT DOSAGE GIVEN TO DATE: 2
RAD ONC ARIA REFERENCE POINT DOSAGE GIVEN TO DATE: 2.06
RAD ONC ARIA REFERENCE POINT ID: NORMAL
RAD ONC ARIA REFERENCE POINT ID: NORMAL
RAD ONC ARIA REFERENCE POINT SESSION DOSAGE GIVEN: 2
RAD ONC ARIA REFERENCE POINT SESSION DOSAGE GIVEN: 2.06

## 2023-06-19 PROCEDURE — 77386 HCHG IMRT DELIVERY COMPLEX: CPT | Performed by: RADIOLOGY

## 2023-06-19 PROCEDURE — 77280 THER RAD SIMULAJ FIELD SMPL: CPT | Performed by: RADIOLOGY

## 2023-06-20 ENCOUNTER — HOSPITAL ENCOUNTER (OUTPATIENT)
Dept: RADIATION ONCOLOGY | Facility: MEDICAL CENTER | Age: 83
End: 2023-06-20
Payer: MEDICARE

## 2023-06-20 LAB
CHEMOTHERAPY INFUSION START DATE: NORMAL
CHEMOTHERAPY RECORDS: 2
CHEMOTHERAPY RECORDS: 6000
CHEMOTHERAPY RECORDS: NORMAL
CHEMOTHERAPY RX CANCER: NORMAL
DATE 1ST CHEMO CANCER: NORMAL
RAD ONC ARIA COURSE LAST TREATMENT DATE: NORMAL
RAD ONC ARIA COURSE TREATMENT ELAPSED DAYS: NORMAL
RAD ONC ARIA REFERENCE POINT DOSAGE GIVEN TO DATE: 4
RAD ONC ARIA REFERENCE POINT DOSAGE GIVEN TO DATE: 4.12
RAD ONC ARIA REFERENCE POINT ID: NORMAL
RAD ONC ARIA REFERENCE POINT ID: NORMAL
RAD ONC ARIA REFERENCE POINT SESSION DOSAGE GIVEN: 2
RAD ONC ARIA REFERENCE POINT SESSION DOSAGE GIVEN: 2.06

## 2023-06-20 PROCEDURE — 77014 PR CT GUIDANCE PLACEMENT RAD THERAPY FIELDS: CPT | Mod: 26 | Performed by: RADIOLOGY

## 2023-06-20 PROCEDURE — 77386 HCHG IMRT DELIVERY COMPLEX: CPT | Performed by: RADIOLOGY

## 2023-06-21 ENCOUNTER — HOSPITAL ENCOUNTER (OUTPATIENT)
Dept: RADIATION ONCOLOGY | Facility: MEDICAL CENTER | Age: 83
End: 2023-06-21
Payer: MEDICARE

## 2023-06-21 VITALS
SYSTOLIC BLOOD PRESSURE: 114 MMHG | OXYGEN SATURATION: 95 % | DIASTOLIC BLOOD PRESSURE: 69 MMHG | HEART RATE: 74 BPM | TEMPERATURE: 97.3 F | BODY MASS INDEX: 26.55 KG/M2 | WEIGHT: 149.9 LBS

## 2023-06-21 LAB
CHEMOTHERAPY INFUSION START DATE: NORMAL
CHEMOTHERAPY RECORDS: 2
CHEMOTHERAPY RECORDS: 6000
CHEMOTHERAPY RECORDS: NORMAL
CHEMOTHERAPY RX CANCER: NORMAL
DATE 1ST CHEMO CANCER: NORMAL
RAD ONC ARIA COURSE LAST TREATMENT DATE: NORMAL
RAD ONC ARIA COURSE TREATMENT ELAPSED DAYS: NORMAL
RAD ONC ARIA REFERENCE POINT DOSAGE GIVEN TO DATE: 6
RAD ONC ARIA REFERENCE POINT DOSAGE GIVEN TO DATE: 6.18
RAD ONC ARIA REFERENCE POINT ID: NORMAL
RAD ONC ARIA REFERENCE POINT ID: NORMAL
RAD ONC ARIA REFERENCE POINT SESSION DOSAGE GIVEN: 2
RAD ONC ARIA REFERENCE POINT SESSION DOSAGE GIVEN: 2.06

## 2023-06-21 PROCEDURE — 77014 PR CT GUIDANCE PLACEMENT RAD THERAPY FIELDS: CPT | Mod: 26 | Performed by: RADIOLOGY

## 2023-06-21 PROCEDURE — 77336 RADIATION PHYSICS CONSULT: CPT | Performed by: RADIOLOGY

## 2023-06-21 PROCEDURE — 77331 SPECIAL RADIATION DOSIMETRY: CPT | Mod: 26 | Performed by: RADIOLOGY

## 2023-06-21 PROCEDURE — 77331 SPECIAL RADIATION DOSIMETRY: CPT | Performed by: RADIOLOGY

## 2023-06-21 PROCEDURE — 77386 HCHG IMRT DELIVERY COMPLEX: CPT | Performed by: RADIOLOGY

## 2023-06-21 ASSESSMENT — PAIN SCALES - GENERAL: PAINLEVEL: NO PAIN

## 2023-06-21 ASSESSMENT — FIBROSIS 4 INDEX: FIB4 SCORE: 1.81

## 2023-06-21 NOTE — ON TREATMENT VISIT
ON TREATMENT  NOTE  RADIATION ONCOLOGY DEPARTMENT    Patient name:  Wally Joel    Primary Physician:  Pcp Pt States None MRN: [id number]  CSN: [id number]   Referring physician:  Jose R Wright M.D.   : 1940, 83 y.o.     ENCOUNTER DATE:  2023      DIAGNOSIS:  SCC (squamous cell carcinoma), ear, left  Staging form: Cutaneous Carcinoma of the Head and Neck, AJCC 8th Edition.  - Pathologic stage from 2023: Stage III (pT3, pN1, cM0) - Signed by Lupe PATEL M.D. on 2023  Histopathologic type: Squamous cell carcinoma, NOS  Histologic grade (G): G3  Histologic grading system: 4 grade system  Residual tumor (R): R0 - None  Laterality: Left  Lymph-vascular invasion (LVI): LVI not present (absent)/not identified  Extraosseous extension: Absent      TREATMENT SUMMARY:  Course First Treatment Date 2023  Course Last Treatment Date 2023  Aria Treatment Information          2023   Aria Course Treatment Dates   Course First Treatment Date 2023    Course Last Treatment Date 2023    Aria Treatment Summary   L_Ear_Neck  Plan from Course C1_L ear&neck   Fraction 3 of 30   Elapsed Course Days 2 @    Prescribed Fraction Dose 200 cGy   Prescribed Total Dose 6,000 cGy   L_Ear_Neck  Reference Point from Course C1_L ear&neck   Elapsed Course Days 2 @    Session Dose 200 cGy   Total Dose 600 cGy   L_Ear_Neck CP  Reference Point from Course C1_L ear&neck   Elapsed Course Days 2 @    Session Dose 206 cGy   Total Dose 618 cGy          SUBJECTIVE:  Fatigue    VITAL SIGNS:      2023     9:42 AM 2023     3:04 PM 2023     4:30 PM 2023     4:15 PM 2023     4:00 PM 2023     3:45 PM 2023     3:30 PM   Vitals   SYSTOLIC 114 119  135 125 127 132   DIASTOLIC 69 64  72 63 65 62   Pulse 74 78  72 76 73 75   Temperature 36.3 °C (97.3 °F) 36.4 °C (97.6 °F)  36.6 °C (97.8 °F)      Respiration    15 17 18 16   Weight 149.9  "153.22        Height  1.6 m (5' 3\")        BMI 26.55 kg/m2 27.14 kg/m2        Pulse Oximetry 95 % 94 % 94 % 95 % 96 % 94 % 95 %     KPS: 70, Cares for self; unable to carry on normal activity or to do active work (ECOG equivalent 1)  Encounter Vitals  Temperature: 36.3 °C (97.3 °F)  Temp src: Temporal  Blood Pressure : 114/69  Pulse: 74  Pulse Oximetry: 95 %  Weight: 68 kg (149 lb 14.4 oz)  Pain Score: No pain      6/21/2023     9:42 AM 6/6/2023     3:04 PM   Pain Assessment   Pain Score NO PAIN NO PAIN          PHYSICAL EXAM:  Physical Exam  Vitals and nursing note reviewed.   Constitutional:       General: He is not in acute distress.     Appearance: He is well-developed.   HENT:      Head: Normocephalic.   Skin:     General: Skin is warm and dry.      Findings: No erythema.   Neurological:      Mental Status: He is alert and oriented to person, place, and time.   Psychiatric:         Behavior: Behavior normal.         Thought Content: Thought content normal.         Judgment: Judgment normal.              6/21/2023     9:44 AM   Toxicity Assessment   Toxicity Assessment Skin   Fatigue (lethargy, malaise, asthenia) Increased fatigue over baseline, but not altering normal activities   Radiation Dermatitis None   Photosensitivity None   Dry Skin Normal   Alopecia Normal   Erythema Multiforme (e.g., Ro-Erik syndrome, toxic epidermal necrolysis) Absent   Nail Changes Normal   Wound (Non-Infectious) None   RT - Pain due to RT None   Tumor Pain (onset or exacerbation of tumor pain due to treatment) None   Skin - Late RT No change from baseline       CURRENT MEDICATIONS:    Current Outpatient Medications:     Zanubrutinib (BRUKINSA) 80 MG Cap, Take 2 capsules by mouth twice daily, Disp: 120 Capsule, Rfl: 1    Degarelix Acetate (FIRMAGON, 240 MG DOSE, SC), Inject  under the skin every 6 months. Pt unkn dose, Disp: , Rfl:     BRUKINSA 80 MG Cap, Take 80 mg by mouth 2 times a day., Disp: , Rfl:     Leuprolide " Acetate, 6 Month, (LUPRON DEPOT, 6-MONTH, IM), Inject 45 mg into the shoulder, thigh, or buttocks., Disp: , Rfl:     LABORATORY DATA:   Lab Results   Component Value Date/Time    SODIUM 137 05/05/2023 01:50 PM    POTASSIUM 4.5 05/05/2023 01:50 PM    CHLORIDE 104 05/05/2023 01:50 PM    CO2 22 05/05/2023 01:50 PM    GLUCOSE 129 (H) 05/05/2023 01:50 PM    BUN 25 (H) 05/05/2023 01:50 PM    CREATININE 1.44 (H) 05/05/2023 01:50 PM       Lab Results   Component Value Date/Time    WBC 62.3 (HH) 04/24/2023 11:17 AM    RBC 4.19 (L) 04/24/2023 11:17 AM    HEMOGLOBIN 10.5 (L) 04/24/2023 11:17 AM    HEMATOCRIT 34.6 (L) 04/24/2023 11:17 AM    MCV 82.6 04/24/2023 11:17 AM    MCH 25.1 (L) 04/24/2023 11:17 AM    MCHC 30.3 (L) 04/24/2023 11:17 AM    PLATELETCT 165 04/24/2023 11:17 AM         RADIOLOGY DATA:  No results found.    IMPRESSION:  Cancer Staging   SCC (squamous cell carcinoma), ear, left  Staging form: Cutaneous Carcinoma of the Head and Neck, AJCC 8th Edition.  - Pathologic stage from 6/6/2023: Stage III (pT3, pN1, cM0) - Signed by Lupe PATEL M.D. on 6/6/2023      PLAN:  No change in treatment plan  Evaluate dose to preauricular area scar with TLD and addition 2 cm strip of 1 cm bolus covering scar.    Disposition:  Treatment plan and imaging reviewed. Questions answered. Continue therapy outlined.     Lupe PATEL M.D.    No orders of the defined types were placed in this encounter.

## 2023-06-22 ENCOUNTER — HOSPITAL ENCOUNTER (OUTPATIENT)
Dept: RADIATION ONCOLOGY | Facility: MEDICAL CENTER | Age: 83
End: 2023-06-22
Payer: MEDICARE

## 2023-06-22 ENCOUNTER — DOCUMENTATION (OUTPATIENT)
Dept: RADIATION ONCOLOGY | Facility: MEDICAL CENTER | Age: 83
End: 2023-06-22
Payer: MEDICARE

## 2023-06-22 LAB
CHEMOTHERAPY INFUSION START DATE: NORMAL
CHEMOTHERAPY RECORDS: 2
CHEMOTHERAPY RECORDS: 6000
CHEMOTHERAPY RECORDS: NORMAL
CHEMOTHERAPY RX CANCER: NORMAL
DATE 1ST CHEMO CANCER: NORMAL
RAD ONC ARIA COURSE LAST TREATMENT DATE: NORMAL
RAD ONC ARIA COURSE TREATMENT ELAPSED DAYS: NORMAL
RAD ONC ARIA REFERENCE POINT DOSAGE GIVEN TO DATE: 8
RAD ONC ARIA REFERENCE POINT DOSAGE GIVEN TO DATE: 8.24
RAD ONC ARIA REFERENCE POINT ID: NORMAL
RAD ONC ARIA REFERENCE POINT ID: NORMAL
RAD ONC ARIA REFERENCE POINT SESSION DOSAGE GIVEN: 2
RAD ONC ARIA REFERENCE POINT SESSION DOSAGE GIVEN: 2.06

## 2023-06-22 PROCEDURE — 77014 PR CT GUIDANCE PLACEMENT RAD THERAPY FIELDS: CPT | Mod: 26 | Performed by: RADIOLOGY

## 2023-06-22 PROCEDURE — 77386 HCHG IMRT DELIVERY COMPLEX: CPT | Performed by: RADIOLOGY

## 2023-06-22 NOTE — PROGRESS NOTES
Nutrition Services: RD Consultation/ New Radiation Start  Wally Joel is a 83 y.o. male with diagnosis of SCC (squamous cell carcinoma), ear, left    RD met with pt to assess current intake, appetite, and nutritional status.  Pt presents to appointment unaccompanied. Per pt, he has been eating normally and has a good appetite. He reports no GI concerns. He states his only symptom since starting treatment is fatigue. He states he is chewing and swallowing all foods like he normally does. Per pt, he lives in [place] is staying at the [facility]. He reports he tries walking daily for a couple of miles. Per pt, he gets food from the [facility] [employer] and dines out while he is in [place]. He reports he has a homemade soup from his friend who is a retired RN. Per, he usually eats ~3 meals daily. He reports drinking a lot of water (~6 gl/d) as he has a catheter and has to drink fluids. Per pt, he eats minimal meats and is mostly vegetarian. Pt states he is taking a MVI, vitamin B, vitamin C and vitamin D supplement that he has discussed with his MD. He states he has a box of Ensure at home but plans to get more while he is here. Per pt, his UBW is 150 lbs. He states he recently gained weight then lost it. Pt asked about the contact info for his [employer] and if he can do laundry at the [facility]. Pt did not express any further nutrition-related questions or concerns at this time.     Dietary intake pattern:    B: leftover fried rice  L/D: Chinese food (vegetable brown rice)/ homemade minestrone soup/ salad from Cafe  Drinks: water (~6 gl/d), coffee    Past Medical History:   Diagnosis Date    BPH (benign prostatic hyperplasia)     Cancer (HCC)     prostate 2012, CLL 2014    Cancer of skin of left ear     CLL (chronic lymphocytic leukemia) (HCC) 07/2014    Inguinal hernia     Jaundice     with Cipro reaction       Past Surgical History:   Procedure Laterality Date    EXCISION, LESION, HEAD AND NECK REGION Left 4/24/2023  "   Procedure: PARTIAL AURICULECTOMY, LEFT EXCISION TUMOR SOFT TISSUE; NECK; DEEP; SUBFASCIAL, LEFT EXCISION, MALIGNANT LESION (10), EARS;;  Surgeon: Raza Lino M.D.;  Location: SURGERY SAME DAY [facility];  Service: Ent    OK EXPLORATORY OF ABDOMEN N/A 11/20/2020    Procedure: LAPAROTOMY, EXPLORATORY, PSB BOWEL REPAIR;  Surgeon: Pili Galicia M.D.;  Location: SURGERY [facility];  Service: General    VENTRAL HERNIA REPAIR N/A 11/20/2020    Procedure: REPAIR, HERNIA, VENTRAL;  Surgeon: Pili Galicia M.D.;  Location: SURGERY [facility];  Service: General    CYSTOSCOPY  3/16/2018    Procedure: CYSTOSCOPY- LITHOPAXY;  Surgeon: Rui Tang M.D.;  Location: SURGERY [facility];  Service: Urology    INGUINAL HERNIA LAPAROSCOPIC  3/1/2017    Procedure: INGUINAL HERNIA LAPAROSCOPIC RT AND INCISIONAL WITH MESH;  Surgeon: Kory Suero M.D.;  Location: SURGERY [facility];  Service:     CYSTOSCOPY  10/29/2014    Performed by Rui Tang M.D. at SURGERY [facility]    TRANS URETHRAL RESECTION PROSTATE  10/29/2014    Performed by Rui Tang M.D. at [facility]    OTHER ABDOMINAL SURGERY  2010    ventral hernia    OTHER ORTHOPEDIC SURGERY  1988, 1989    knee scope x 2       Biochemical/ Anthropometric Assessment:  Treatment Regimen: XRT, Zanubrutinib  Pertinent Labs (5/5/23): glucose 129, Bun 25, creatinine 1.44, GFR 49, alk phos 141  Pertinent Meds: Zanubrutinib, Lupron Depot, Firmagon  Wt Readings from Last 1 Encounters:   06/21/23 68 kg (149 lb 14.4 oz)      Ht Readings from Last 1 Encounters:   06/06/23 1.6 m (5' 3\")      BMI Readings from Last 1 Encounters:   06/21/23 26.55 kg/m²   BMI Classification: overweight    Weight History:  Wt Readings from Last 6 Encounters:   06/21/23 68 kg (149 lb 14.4 oz)   06/06/23 69.5 kg (153 lb 3.5 oz)   04/24/23 67.2 kg (148 lb 2.4 oz)   11/20/20 67.5 kg (148 lb 13 oz)   03/16/18 76.9 kg (169 lb 8.5 oz)   03/20/17 76.7 kg (169 lb)       Weight " Change/Malnutrition Risk: Pt appears to have gained 2.3 kg from 4/24 to 6/06 and then lost 1.  5 kg/ 2.2% from 6/06 to 6/21 which is not considered severe.     Interventions:  Introduced self and discussed role of dietitian throughout treatment process   Provided and discussed handout regarding general nutrition guidelines as well as nutritional recommendations for patient's with cancer, including tips/tricks should side effects of tx occur.   Encouraged balanced diet with plant-based focus. Advised reducing or eliminating red/ processed meats as well as foods high in saturated fats, sodium, and added sugars as able. Reviewed sources. Verbalized importance of nutrition and maintaining LBM throughout treatment.   Encouraged physical activity as tolerated with emphasis on reducing long periods of sedentary activity as able. Continue walking daily as able.   Increase meal and snack frequency to 4-6 x/day.   Focus on high protein foods, fruits and vegetables with all meals/snacks - handout provided.  Incorporate boost plus or comparable protein supplement. Provided coupons for Ensure.   Provided pt with contact info for [employer] per his request.  Recommended for pt to talk to representative at [facility] about laundry services.     Pt reports understanding and was receptive to information provided.   RD provided contact information. Encouraged pt to reach out as questions/concerns arise.   RD available [phone]     Rhombic Flap Text: The defect edges were debeveled with a #15 scalpel blade.  Given the location of the defect and the proximity to free margins a rhombic flap was deemed most appropriate.  Using a sterile surgical marker, an appropriate rhombic flap was drawn incorporating the defect.    The area thus outlined was incised deep to adipose tissue with a #15 scalpel blade.  The skin margins were undermined to an appropriate distance in all directions utilizing iris scissors.

## 2023-06-23 ENCOUNTER — HOSPITAL ENCOUNTER (OUTPATIENT)
Dept: RADIATION ONCOLOGY | Facility: MEDICAL CENTER | Age: 83
End: 2023-06-23
Payer: MEDICARE

## 2023-06-23 ENCOUNTER — PATIENT OUTREACH (OUTPATIENT)
Dept: ONCOLOGY | Facility: MEDICAL CENTER | Age: 83
End: 2023-06-23
Payer: MEDICARE

## 2023-06-23 LAB
CHEMOTHERAPY INFUSION START DATE: NORMAL
CHEMOTHERAPY RECORDS: 2
CHEMOTHERAPY RECORDS: 6000
CHEMOTHERAPY RECORDS: NORMAL
CHEMOTHERAPY RX CANCER: NORMAL
DATE 1ST CHEMO CANCER: NORMAL
RAD ONC ARIA COURSE LAST TREATMENT DATE: NORMAL
RAD ONC ARIA COURSE TREATMENT ELAPSED DAYS: NORMAL
RAD ONC ARIA REFERENCE POINT DOSAGE GIVEN TO DATE: 10
RAD ONC ARIA REFERENCE POINT DOSAGE GIVEN TO DATE: 10.3
RAD ONC ARIA REFERENCE POINT ID: NORMAL
RAD ONC ARIA REFERENCE POINT ID: NORMAL
RAD ONC ARIA REFERENCE POINT SESSION DOSAGE GIVEN: 2
RAD ONC ARIA REFERENCE POINT SESSION DOSAGE GIVEN: 2.06

## 2023-06-23 PROCEDURE — 77386 HCHG IMRT DELIVERY COMPLEX: CPT | Performed by: RADIOLOGY

## 2023-06-23 PROCEDURE — 77014 PR CT GUIDANCE PLACEMENT RAD THERAPY FIELDS: CPT | Mod: 26 | Performed by: RADIOLOGY

## 2023-06-23 PROCEDURE — 77427 RADIATION TX MANAGEMENT X5: CPT | Performed by: RADIOLOGY

## 2023-06-23 NOTE — PROGRESS NOTES
On June 23, 2023, Oncology Social Worker Krista Herrera contacted [phone] at [phone] to inform them she had received pt.'s oral chemotherapy however she is not legally able to disburse this medication to pt. due to it being outside of scope and licensure restraints.

## 2023-06-26 ENCOUNTER — HOSPITAL ENCOUNTER (OUTPATIENT)
Dept: RADIATION ONCOLOGY | Facility: MEDICAL CENTER | Age: 83
End: 2023-06-26
Payer: MEDICARE

## 2023-06-26 LAB
CHEMOTHERAPY INFUSION START DATE: NORMAL
CHEMOTHERAPY RECORDS: 2
CHEMOTHERAPY RECORDS: 6000
CHEMOTHERAPY RECORDS: NORMAL
CHEMOTHERAPY RX CANCER: NORMAL
DATE 1ST CHEMO CANCER: NORMAL
RAD ONC ARIA COURSE LAST TREATMENT DATE: NORMAL
RAD ONC ARIA COURSE TREATMENT ELAPSED DAYS: NORMAL
RAD ONC ARIA REFERENCE POINT DOSAGE GIVEN TO DATE: 12
RAD ONC ARIA REFERENCE POINT DOSAGE GIVEN TO DATE: 12.36
RAD ONC ARIA REFERENCE POINT ID: NORMAL
RAD ONC ARIA REFERENCE POINT ID: NORMAL
RAD ONC ARIA REFERENCE POINT SESSION DOSAGE GIVEN: 2
RAD ONC ARIA REFERENCE POINT SESSION DOSAGE GIVEN: 2.06

## 2023-06-26 PROCEDURE — 77014 PR CT GUIDANCE PLACEMENT RAD THERAPY FIELDS: CPT | Mod: 26 | Performed by: RADIOLOGY

## 2023-06-26 PROCEDURE — 77386 HCHG IMRT DELIVERY COMPLEX: CPT | Performed by: RADIOLOGY

## 2023-06-27 ENCOUNTER — HOSPITAL ENCOUNTER (OUTPATIENT)
Dept: RADIATION ONCOLOGY | Facility: MEDICAL CENTER | Age: 83
End: 2023-06-27
Payer: MEDICARE

## 2023-06-27 LAB
CHEMOTHERAPY INFUSION START DATE: NORMAL
CHEMOTHERAPY RECORDS: 2
CHEMOTHERAPY RECORDS: 6000
CHEMOTHERAPY RECORDS: NORMAL
CHEMOTHERAPY RX CANCER: NORMAL
DATE 1ST CHEMO CANCER: NORMAL
RAD ONC ARIA COURSE LAST TREATMENT DATE: NORMAL
RAD ONC ARIA COURSE TREATMENT ELAPSED DAYS: NORMAL
RAD ONC ARIA REFERENCE POINT DOSAGE GIVEN TO DATE: 14
RAD ONC ARIA REFERENCE POINT DOSAGE GIVEN TO DATE: 14.42
RAD ONC ARIA REFERENCE POINT ID: NORMAL
RAD ONC ARIA REFERENCE POINT ID: NORMAL
RAD ONC ARIA REFERENCE POINT SESSION DOSAGE GIVEN: 2
RAD ONC ARIA REFERENCE POINT SESSION DOSAGE GIVEN: 2.06

## 2023-06-27 PROCEDURE — 77014 PR CT GUIDANCE PLACEMENT RAD THERAPY FIELDS: CPT | Mod: 26 | Performed by: RADIOLOGY

## 2023-06-27 PROCEDURE — 77386 HCHG IMRT DELIVERY COMPLEX: CPT | Performed by: RADIOLOGY

## 2023-06-28 ENCOUNTER — HOSPITAL ENCOUNTER (OUTPATIENT)
Dept: RADIATION ONCOLOGY | Facility: MEDICAL CENTER | Age: 83
End: 2023-06-28
Payer: MEDICARE

## 2023-06-28 VITALS
WEIGHT: 151 LBS | SYSTOLIC BLOOD PRESSURE: 124 MMHG | TEMPERATURE: 97.1 F | DIASTOLIC BLOOD PRESSURE: 59 MMHG | BODY MASS INDEX: 26.75 KG/M2 | OXYGEN SATURATION: 96 % | HEART RATE: 77 BPM

## 2023-06-28 LAB
CHEMOTHERAPY INFUSION START DATE: NORMAL
CHEMOTHERAPY RECORDS: 2
CHEMOTHERAPY RECORDS: 6000
CHEMOTHERAPY RECORDS: NORMAL
CHEMOTHERAPY RX CANCER: NORMAL
DATE 1ST CHEMO CANCER: NORMAL
RAD ONC ARIA COURSE LAST TREATMENT DATE: NORMAL
RAD ONC ARIA COURSE TREATMENT ELAPSED DAYS: NORMAL
RAD ONC ARIA REFERENCE POINT DOSAGE GIVEN TO DATE: 16
RAD ONC ARIA REFERENCE POINT DOSAGE GIVEN TO DATE: 16.47
RAD ONC ARIA REFERENCE POINT ID: NORMAL
RAD ONC ARIA REFERENCE POINT ID: NORMAL
RAD ONC ARIA REFERENCE POINT SESSION DOSAGE GIVEN: 2
RAD ONC ARIA REFERENCE POINT SESSION DOSAGE GIVEN: 2.06

## 2023-06-28 PROCEDURE — 77336 RADIATION PHYSICS CONSULT: CPT | Performed by: RADIOLOGY

## 2023-06-28 PROCEDURE — 77014 PR CT GUIDANCE PLACEMENT RAD THERAPY FIELDS: CPT | Mod: 26 | Performed by: RADIOLOGY

## 2023-06-28 PROCEDURE — 77386 HCHG IMRT DELIVERY COMPLEX: CPT | Performed by: RADIOLOGY

## 2023-06-28 ASSESSMENT — FIBROSIS 4 INDEX: FIB4 SCORE: 1.81

## 2023-06-28 ASSESSMENT — PAIN SCALES - GENERAL: PAINLEVEL: NO PAIN

## 2023-06-28 NOTE — ON TREATMENT VISIT
ON TREATMENT  NOTE  RADIATION ONCOLOGY DEPARTMENT    Patient name:  Wally Joel    Primary Physician:  Pcp Pt States None MRN: [id number]  CSN: [id number]   Referring physician:  Jose R Wright M.D.   : 1940, 83 y.o.     ENCOUNTER DATE:  2023      DIAGNOSIS:  SCC (squamous cell carcinoma), ear, left  Staging form: Cutaneous Carcinoma of the Head and Neck, AJCC 8th Edition.  - Pathologic stage from 2023: Stage III (pT3, pN1, cM0) - Signed by Lupe PATEL M.D. on 2023  Histopathologic type: Squamous cell carcinoma, NOS  Histologic grade (G): G3  Histologic grading system: 4 grade system  Residual tumor (R): R0 - None  Laterality: Left  Lymph-vascular invasion (LVI): LVI not present (absent)/not identified  Extraosseous extension: Absent      TREATMENT SUMMARY:  Course First Treatment Date 2023  Course Last Treatment Date 2023  Aria Treatment Information          2023   Aria Course Treatment Dates   Course First Treatment Date 2023    Course Last Treatment Date 2023    Aria Treatment Summary   L_Ear_Neck  Plan from Course C1_L ear&neck   Fraction 8 of 30   Elapsed Course Days  @    Prescribed Fraction Dose 200 cGy   Prescribed Total Dose 6,000 cGy   L_Ear_Neck  Reference Point from Course C1_L ear&neck   Elapsed Course Days  @    Session Dose 200 cGy   Total Dose 1,600 cGy   L_Ear_Neck CP  Reference Point from Course C1_L ear&neck   Elapsed Course Days  @    Session Dose 206 cGy   Total Dose 1,647 cGy          SUBJECTIVE:  Tolerated treatments well.  Mild sore throat occasionally in the morning.    VITAL SIGNS:      2023     9:33 AM 2023     9:42 AM 2023     3:04 PM 2023     4:30 PM 2023     4:15 PM 2023     4:00 PM 2023     3:45 PM   Vitals   SYSTOLIC 124 114 119  135 125 127   DIASTOLIC 59 69 64  72 63 65   Pulse 77 74 78  72 76 73   Temperature 36.2 °C (97.1 °F) 36.3 °C (97.3  "°F) 36.4 °C (97.6 °F)  36.6 °C (97.8 °F)     Respiration     15 17 18   Weight 151 149.9 153.22       Height   1.6 m (5' 3\")       BMI 26.75 kg/m2 26.55 kg/m2 27.14 kg/m2       Pulse Oximetry 96 % 95 % 94 % 94 % 95 % 96 % 94 %     KPS: 80, Normal activity with effort; some signs or symptoms of disease (ECOG equivalent 1)  Encounter Vitals  Temperature: 36.2 °C (97.1 °F)  Temp src: Temporal  Blood Pressure : 124/59  Pulse: 77  Pulse Oximetry: 96 %  Weight: 68.5 kg (151 lb)  Pain Score: No pain      6/28/2023     9:33 AM 6/21/2023     9:42 AM 6/6/2023     3:04 PM   Pain Assessment   Pain Score NO PAIN NO PAIN NO PAIN          PHYSICAL EXAM:  Physical Exam  Vitals and nursing note reviewed.   Constitutional:       General: He is not in acute distress.     Appearance: He is well-developed.   HENT:      Head: Normocephalic.      Ears:      Comments: Visible mass left postauricular region.  Possibly smaller  Skin:     General: Skin is warm and dry.      Findings: Erythema present.   Neurological:      Mental Status: He is alert and oriented to person, place, and time.   Psychiatric:         Behavior: Behavior normal.         Thought Content: Thought content normal.         Judgment: Judgment normal.              6/28/2023     9:34 AM 6/21/2023     9:44 AM   Toxicity Assessment   Toxicity Assessment Skin Skin   Fatigue (lethargy, malaise, asthenia) None Increased fatigue over baseline, but not altering normal activities   Radiation Dermatitis None None   Photosensitivity None None   Dry Skin Normal Normal   Alopecia Normal Normal   Erythema Multiforme (e.g., Ro-Erik syndrome, toxic epidermal necrolysis) Absent Absent   Nail Changes Normal Normal   Wound (Non-Infectious) None None   Wound (Infectious) None    RT - Pain due to RT None None   Tumor Pain (onset or exacerbation of tumor pain due to treatment) None None   Skin - Late RT No change from baseline No change from baseline       CURRENT MEDICATIONS:    Current " Outpatient Medications:     Zanubrutinib (BRUKINSA) 80 MG Cap, 2 tab bid, Disp: 120 Capsule, Rfl: 1    Zanubrutinib (BRUKINSA) 80 MG Cap, Take 2 capsules by mouth twice daily, Disp: 120 Capsule, Rfl: 1    Degarelix Acetate (FIRMAGON, 240 MG DOSE, SC), Inject  under the skin every 6 months. Pt unkn dose, Disp: , Rfl:     BRUKINSA 80 MG Cap, Take 80 mg by mouth 2 times a day., Disp: , Rfl:     Leuprolide Acetate, 6 Month, (LUPRON DEPOT, 6-MONTH, IM), Inject 45 mg into the shoulder, thigh, or buttocks., Disp: , Rfl:     LABORATORY DATA:   Lab Results   Component Value Date/Time    SODIUM 137 05/05/2023 01:50 PM    POTASSIUM 4.5 05/05/2023 01:50 PM    CHLORIDE 104 05/05/2023 01:50 PM    CO2 22 05/05/2023 01:50 PM    GLUCOSE 129 (H) 05/05/2023 01:50 PM    BUN 25 (H) 05/05/2023 01:50 PM    CREATININE 1.44 (H) 05/05/2023 01:50 PM       Lab Results   Component Value Date/Time    WBC 62.3 (HH) 04/24/2023 11:17 AM    RBC 4.19 (L) 04/24/2023 11:17 AM    HEMOGLOBIN 10.5 (L) 04/24/2023 11:17 AM    HEMATOCRIT 34.6 (L) 04/24/2023 11:17 AM    MCV 82.6 04/24/2023 11:17 AM    MCH 25.1 (L) 04/24/2023 11:17 AM    MCHC 30.3 (L) 04/24/2023 11:17 AM    PLATELETCT 165 04/24/2023 11:17 AM         RADIOLOGY DATA:  No results found.    IMPRESSION:  Cancer Staging   SCC (squamous cell carcinoma), ear, left  Staging form: Cutaneous Carcinoma of the Head and Neck, AJCC 8th Edition.  - Pathologic stage from 6/6/2023: Stage III (pT3, pN1, cM0) - Signed by Lupe PATEL M.D. on 6/6/2023      PLAN:  No change in treatment plan    Disposition:  Treatment plan and imaging reviewed. Questions answered. Continue therapy outlined.     Lupe PATEL M.D.    No orders of the defined types were placed in this encounter.

## 2023-06-29 ENCOUNTER — HOSPITAL ENCOUNTER (OUTPATIENT)
Dept: RADIATION ONCOLOGY | Facility: MEDICAL CENTER | Age: 83
End: 2023-06-29
Payer: MEDICARE

## 2023-06-29 LAB
CHEMOTHERAPY INFUSION START DATE: NORMAL
CHEMOTHERAPY RECORDS: 2
CHEMOTHERAPY RECORDS: 6000
CHEMOTHERAPY RECORDS: NORMAL
CHEMOTHERAPY RX CANCER: NORMAL
DATE 1ST CHEMO CANCER: NORMAL
RAD ONC ARIA COURSE LAST TREATMENT DATE: NORMAL
RAD ONC ARIA COURSE TREATMENT ELAPSED DAYS: NORMAL
RAD ONC ARIA REFERENCE POINT DOSAGE GIVEN TO DATE: 18
RAD ONC ARIA REFERENCE POINT DOSAGE GIVEN TO DATE: 18.53
RAD ONC ARIA REFERENCE POINT ID: NORMAL
RAD ONC ARIA REFERENCE POINT ID: NORMAL
RAD ONC ARIA REFERENCE POINT SESSION DOSAGE GIVEN: 2
RAD ONC ARIA REFERENCE POINT SESSION DOSAGE GIVEN: 2.06

## 2023-06-29 PROCEDURE — 77014 PR CT GUIDANCE PLACEMENT RAD THERAPY FIELDS: CPT | Mod: 26 | Performed by: RADIOLOGY

## 2023-06-29 PROCEDURE — 77386 HCHG IMRT DELIVERY COMPLEX: CPT | Performed by: RADIOLOGY

## 2023-06-30 ENCOUNTER — HOSPITAL ENCOUNTER (OUTPATIENT)
Dept: RADIATION ONCOLOGY | Facility: MEDICAL CENTER | Age: 83
End: 2023-06-30
Payer: MEDICARE

## 2023-06-30 LAB
CHEMOTHERAPY INFUSION START DATE: NORMAL
CHEMOTHERAPY RECORDS: 2
CHEMOTHERAPY RECORDS: 6000
CHEMOTHERAPY RECORDS: NORMAL
CHEMOTHERAPY RX CANCER: NORMAL
DATE 1ST CHEMO CANCER: NORMAL
RAD ONC ARIA COURSE LAST TREATMENT DATE: NORMAL
RAD ONC ARIA COURSE TREATMENT ELAPSED DAYS: NORMAL
RAD ONC ARIA REFERENCE POINT DOSAGE GIVEN TO DATE: 20
RAD ONC ARIA REFERENCE POINT DOSAGE GIVEN TO DATE: 20.59
RAD ONC ARIA REFERENCE POINT ID: NORMAL
RAD ONC ARIA REFERENCE POINT ID: NORMAL
RAD ONC ARIA REFERENCE POINT SESSION DOSAGE GIVEN: 2
RAD ONC ARIA REFERENCE POINT SESSION DOSAGE GIVEN: 2.06

## 2023-06-30 PROCEDURE — 77386 HCHG IMRT DELIVERY COMPLEX: CPT | Performed by: RADIOLOGY

## 2023-06-30 PROCEDURE — 77014 PR CT GUIDANCE PLACEMENT RAD THERAPY FIELDS: CPT | Mod: 26 | Performed by: RADIOLOGY

## 2023-06-30 PROCEDURE — 77427 RADIATION TX MANAGEMENT X5: CPT | Performed by: RADIOLOGY

## 2023-07-03 ENCOUNTER — HOSPITAL ENCOUNTER (OUTPATIENT)
Dept: RADIATION ONCOLOGY | Facility: MEDICAL CENTER | Age: 83
End: 2023-07-03

## 2023-07-03 ENCOUNTER — HOSPITAL ENCOUNTER (OUTPATIENT)
Dept: RADIATION ONCOLOGY | Facility: MEDICAL CENTER | Age: 83
End: 2023-07-31
Attending: RADIOLOGY
Payer: MEDICARE

## 2023-07-03 LAB
CHEMOTHERAPY INFUSION START DATE: NORMAL
CHEMOTHERAPY RECORDS: 2
CHEMOTHERAPY RECORDS: 6000
CHEMOTHERAPY RECORDS: NORMAL
CHEMOTHERAPY RX CANCER: NORMAL
DATE 1ST CHEMO CANCER: NORMAL
RAD ONC ARIA COURSE LAST TREATMENT DATE: NORMAL
RAD ONC ARIA COURSE TREATMENT ELAPSED DAYS: NORMAL
RAD ONC ARIA REFERENCE POINT DOSAGE GIVEN TO DATE: 22
RAD ONC ARIA REFERENCE POINT DOSAGE GIVEN TO DATE: 22.65
RAD ONC ARIA REFERENCE POINT ID: NORMAL
RAD ONC ARIA REFERENCE POINT ID: NORMAL
RAD ONC ARIA REFERENCE POINT SESSION DOSAGE GIVEN: 2
RAD ONC ARIA REFERENCE POINT SESSION DOSAGE GIVEN: 2.06

## 2023-07-03 PROCEDURE — 77014 PR CT GUIDANCE PLACEMENT RAD THERAPY FIELDS: CPT | Mod: 26 | Performed by: RADIOLOGY

## 2023-07-03 PROCEDURE — 77386 HCHG IMRT DELIVERY COMPLEX: CPT | Performed by: RADIOLOGY

## 2023-07-05 ENCOUNTER — HOSPITAL ENCOUNTER (OUTPATIENT)
Dept: RADIATION ONCOLOGY | Facility: MEDICAL CENTER | Age: 83
End: 2023-07-05
Payer: MEDICARE

## 2023-07-05 VITALS
SYSTOLIC BLOOD PRESSURE: 120 MMHG | DIASTOLIC BLOOD PRESSURE: 68 MMHG | HEART RATE: 61 BPM | OXYGEN SATURATION: 97 % | TEMPERATURE: 96.9 F | BODY MASS INDEX: 26.57 KG/M2 | WEIGHT: 150 LBS

## 2023-07-05 LAB
CHEMOTHERAPY INFUSION START DATE: NORMAL
CHEMOTHERAPY RECORDS: 2
CHEMOTHERAPY RECORDS: 6000
CHEMOTHERAPY RECORDS: NORMAL
CHEMOTHERAPY RX CANCER: NORMAL
DATE 1ST CHEMO CANCER: NORMAL
RAD ONC ARIA COURSE LAST TREATMENT DATE: NORMAL
RAD ONC ARIA COURSE TREATMENT ELAPSED DAYS: NORMAL
RAD ONC ARIA REFERENCE POINT DOSAGE GIVEN TO DATE: 24
RAD ONC ARIA REFERENCE POINT DOSAGE GIVEN TO DATE: 24.71
RAD ONC ARIA REFERENCE POINT ID: NORMAL
RAD ONC ARIA REFERENCE POINT ID: NORMAL
RAD ONC ARIA REFERENCE POINT SESSION DOSAGE GIVEN: 2
RAD ONC ARIA REFERENCE POINT SESSION DOSAGE GIVEN: 2.06

## 2023-07-05 PROCEDURE — 77014 PR CT GUIDANCE PLACEMENT RAD THERAPY FIELDS: CPT | Mod: 26 | Performed by: RADIOLOGY

## 2023-07-05 PROCEDURE — 77386 HCHG IMRT DELIVERY COMPLEX: CPT | Performed by: RADIOLOGY

## 2023-07-05 ASSESSMENT — PAIN SCALES - GENERAL: PAINLEVEL: NO PAIN

## 2023-07-05 ASSESSMENT — FIBROSIS 4 INDEX: FIB4 SCORE: 1.81

## 2023-07-05 NOTE — ON TREATMENT VISIT
ON TREATMENT  NOTE  RADIATION ONCOLOGY DEPARTMENT    Patient name:  Wally Joel    Primary Physician:  Pcp Pt States None MRN: [id number]  CSN: [id number]   Referring physician:  Jose R Wright M.D.   : 1940, 83 y.o.     ENCOUNTER DATE:  2023      DIAGNOSIS:  SCC (squamous cell carcinoma), ear, left  Staging form: Cutaneous Carcinoma of the Head and Neck, AJCC 8th Edition.  - Pathologic stage from 2023: Stage III (pT3, pN1, cM0) - Signed by Lupe PATEL M.D. on 2023  Histopathologic type: Squamous cell carcinoma, NOS  Histologic grade (G): G3  Histologic grading system: 4 grade system  Residual tumor (R): R0 - None  Laterality: Left  Lymph-vascular invasion (LVI): LVI not present (absent)/not identified  Extraosseous extension: Absent      TREATMENT SUMMARY:  Course First Treatment Date 2023  Course Last Treatment Date 2023  Aria Treatment Information          2023   Aria Course Treatment Dates   Course First Treatment Date 2023    Course Last Treatment Date 2023    Aria Treatment Summary   L_Ear_Neck  Plan from Course C1_L ear&neck   Fraction    Elapsed Course Days  @ [id number]   Prescribed Fraction Dose 200 cGy   Prescribed Total Dose 6,000 cGy   L_Ear_Neck  Reference Point from Course C1_L ear&neck   Elapsed Course Days [id number]   Session Dose 200 cGy   Total Dose 2,400 cGy   L_Ear_Neck CP  Reference Point from Course C1_L ear&neck   Elapsed Course Days  @ [id number]   Session Dose 206 cGy   Total Dose 2,471 cGy          SUBJECTIVE:  Tolerating treatment  well.    VITAL SIGNS:      2023     9:42 AM 2023     9:33 AM 2023     9:42 AM 2023     3:04 PM 2023     4:30 PM 2023     4:15 PM 2023     4:00 PM   Vitals   SYSTOLIC 120 124 114 119  135 125   DIASTOLIC 68 59 69 64  72 63   Pulse 61 77 74 78  72 76   Temperature 36.1 °C (96.9 °F) 36.2 °C (97.1 °F) 36.3 °C (97.3 °F) 36.4 °C (97.6 °F)  36.6  "°C (97.8 °F)    Respiration      15 17   Weight 150 151 149.9 153.22      Height    1.6 m (5' 3\")      BMI 26.57 kg/m2 26.75 kg/m2 26.55 kg/m2 27.14 kg/m2      Pulse Oximetry 97 % 96 % 95 % 94 % 94 % 95 % 96 %     KPS: 70, Cares for self; unable to carry on normal activity or to do active work (ECOG equivalent 1)  Encounter Vitals  Temperature: 36.1 °C (96.9 °F)  Temp src: Temporal  Blood Pressure : 120/68  Pulse: 61  Pulse Oximetry: 97 %  Weight: 68 kg (150 lb)  Pain Score: No pain      7/5/2023     9:42 AM 6/28/2023     9:33 AM 6/21/2023     9:42 AM 6/6/2023     3:04 PM   Pain Assessment   Pain Score NO PAIN NO PAIN NO PAIN NO PAIN          PHYSICAL EXAM:  Physical Exam  Vitals and nursing note reviewed.   Constitutional:       General: He is not in acute distress.     Appearance: He is well-developed.   HENT:      Head: Normocephalic.   Skin:     General: Skin is warm and dry.      Findings: Erythema (2x1.5 cm L post auricular mass) present.   Neurological:      Mental Status: He is alert and oriented to person, place, and time.   Psychiatric:         Behavior: Behavior normal.         Thought Content: Thought content normal.         Judgment: Judgment normal.              7/5/2023     9:43 AM 6/28/2023     9:34 AM 6/21/2023     9:44 AM   Toxicity Assessment   Toxicity Assessment Skin Skin Skin   Fatigue (lethargy, malaise, asthenia) None None Increased fatigue over baseline, but not altering normal activities   Radiation Dermatitis None None None   Photosensitivity None None None   Dry Skin Normal Normal Normal   Alopecia Normal Normal Normal   Erythema Multiforme (e.g., Ro-Erik syndrome, toxic epidermal necrolysis) Absent Absent Absent   Nail Changes Normal Normal Normal   Wound (Non-Infectious) None None None   Wound (Infectious) None None    RT - Pain due to RT None None None   Tumor Pain (onset or exacerbation of tumor pain due to treatment) None None None   Skin - Late RT No change from baseline No " change from baseline No change from baseline       CURRENT MEDICATIONS:    Current Outpatient Medications:     Zanubrutinib (BRUKINSA) 80 MG Cap, 2 tab bid, Disp: 120 Capsule, Rfl: 1    Zanubrutinib (BRUKINSA) 80 MG Cap, Take 2 capsules by mouth twice daily, Disp: 120 Capsule, Rfl: 1    Degarelix Acetate (FIRMAGON, 240 MG DOSE, SC), Inject  under the skin every 6 months. Pt unkn dose, Disp: , Rfl:     BRUKINSA 80 MG Cap, Take 80 mg by mouth 2 times a day., Disp: , Rfl:     Leuprolide Acetate, 6 Month, (LUPRON DEPOT, 6-MONTH, IM), Inject 45 mg into the shoulder, thigh, or buttocks., Disp: , Rfl:     LABORATORY DATA:   Lab Results   Component Value Date/Time    SODIUM 137 05/05/2023 01:50 PM    POTASSIUM 4.5 05/05/2023 01:50 PM    CHLORIDE 104 05/05/2023 01:50 PM    CO2 22 05/05/2023 01:50 PM    GLUCOSE 129 (H) 05/05/2023 01:50 PM    BUN 25 (H) 05/05/2023 01:50 PM    CREATININE 1.44 (H) 05/05/2023 01:50 PM       Lab Results   Component Value Date/Time    WBC 62.3 (HH) 04/24/2023 11:17 AM    RBC 4.19 (L) 04/24/2023 11:17 AM    HEMOGLOBIN 10.5 (L) 04/24/2023 11:17 AM    HEMATOCRIT 34.6 (L) 04/24/2023 11:17 AM    MCV 82.6 04/24/2023 11:17 AM    MCH 25.1 (L) 04/24/2023 11:17 AM    MCHC 30.3 (L) 04/24/2023 11:17 AM    PLATELETCT 165 04/24/2023 11:17 AM         RADIOLOGY DATA:  No results found.    IMPRESSION:  Cancer Staging   SCC (squamous cell carcinoma), ear, left  Staging form: Cutaneous Carcinoma of the Head and Neck, AJCC 8th Edition.  - Pathologic stage from 6/6/2023: Stage III (pT3, pN1, cM0) - Signed by Lupe PATEL M.D. on 6/6/2023      PLAN:  No change in treatment plan    Disposition:  Treatment plan and imaging reviewed. Questions answered. Continue therapy outlined.     Lupe PATEL M.D.    No orders of the defined types were placed in this encounter.

## 2023-07-06 ENCOUNTER — HOSPITAL ENCOUNTER (OUTPATIENT)
Dept: RADIATION ONCOLOGY | Facility: MEDICAL CENTER | Age: 83
End: 2023-07-06
Payer: MEDICARE

## 2023-07-06 ENCOUNTER — DOCUMENTATION (OUTPATIENT)
Dept: RADIATION ONCOLOGY | Facility: MEDICAL CENTER | Age: 83
End: 2023-07-06
Payer: MEDICARE

## 2023-07-06 LAB
CHEMOTHERAPY INFUSION START DATE: NORMAL
CHEMOTHERAPY RECORDS: 2
CHEMOTHERAPY RECORDS: 6000
CHEMOTHERAPY RECORDS: NORMAL
CHEMOTHERAPY RX CANCER: NORMAL
DATE 1ST CHEMO CANCER: NORMAL
RAD ONC ARIA COURSE LAST TREATMENT DATE: NORMAL
RAD ONC ARIA COURSE TREATMENT ELAPSED DAYS: NORMAL
RAD ONC ARIA REFERENCE POINT DOSAGE GIVEN TO DATE: 26
RAD ONC ARIA REFERENCE POINT DOSAGE GIVEN TO DATE: 26.77
RAD ONC ARIA REFERENCE POINT ID: NORMAL
RAD ONC ARIA REFERENCE POINT ID: NORMAL
RAD ONC ARIA REFERENCE POINT SESSION DOSAGE GIVEN: 2
RAD ONC ARIA REFERENCE POINT SESSION DOSAGE GIVEN: 2.06

## 2023-07-06 PROCEDURE — 77014 PR CT GUIDANCE PLACEMENT RAD THERAPY FIELDS: CPT | Mod: 26 | Performed by: RADIOLOGY

## 2023-07-06 PROCEDURE — 77386 HCHG IMRT DELIVERY COMPLEX: CPT | Performed by: RADIOLOGY

## 2023-07-06 PROCEDURE — 77336 RADIATION PHYSICS CONSULT: CPT | Performed by: RADIOLOGY

## 2023-07-06 NOTE — PROGRESS NOTES
Nutrition Services: Brief Update  Wt Readings from Last 7 Encounters:   07/05/23 68 kg (150 lb)   06/21/23 68 kg (149 lb 14.4 oz)   06/06/23 69.5 kg (153 lb 3.5 oz)   06/28/23 68.5 kg (151 lb)   04/24/23 67.2 kg (148 lb 2.4 oz)   11/20/20 67.5 kg (148 lb 13 oz)   03/16/18 76.9 kg (169 lb 8.5 oz)     Weight Change: Pt's weight remains stable.     Pertinent Recent Lab work:  No new nutrition related labs to review.     RD able to visit pt following XRT. Pt states he is eating well, eating a lot of salads with protein plus 2 Ensure Plus daily. Pt states he has been drinking a lot of water and also electrolyte drinks. Pt reports normal BM's. He states he is walking daily up to 2 miles. Pt reports he has fatigue but walking seems to help with that. Pt asked about getting samples of cream for his skin on his face that he discussed with MD yesterday. Pt is also interested in more coupons for Ensure.     Plan/Recommend:  Communicated with Emili LANE about the cream and provided pt with coupons for Aquaphor  Provided pt with coupons for Ensure  Encouraged pt to continue with current diet regimen and including Ensure Plus 2x/day  Encouraged pt to continue with physical activity as able    Pt verbalizes understanding and is receptive to information provided.   RD available PRN and will make further recommendations as indicated.   [phone]

## 2023-07-07 ENCOUNTER — HOSPITAL ENCOUNTER (OUTPATIENT)
Dept: RADIATION ONCOLOGY | Facility: MEDICAL CENTER | Age: 83
End: 2023-07-07
Payer: MEDICARE

## 2023-07-07 LAB
CHEMOTHERAPY INFUSION START DATE: NORMAL
CHEMOTHERAPY RECORDS: 2
CHEMOTHERAPY RECORDS: 6000
CHEMOTHERAPY RECORDS: NORMAL
CHEMOTHERAPY RX CANCER: NORMAL
DATE 1ST CHEMO CANCER: NORMAL
RAD ONC ARIA COURSE LAST TREATMENT DATE: NORMAL
RAD ONC ARIA COURSE TREATMENT ELAPSED DAYS: NORMAL
RAD ONC ARIA REFERENCE POINT DOSAGE GIVEN TO DATE: 28
RAD ONC ARIA REFERENCE POINT DOSAGE GIVEN TO DATE: 28.83
RAD ONC ARIA REFERENCE POINT ID: NORMAL
RAD ONC ARIA REFERENCE POINT ID: NORMAL
RAD ONC ARIA REFERENCE POINT SESSION DOSAGE GIVEN: 2
RAD ONC ARIA REFERENCE POINT SESSION DOSAGE GIVEN: 2.06

## 2023-07-07 PROCEDURE — 77386 HCHG IMRT DELIVERY COMPLEX: CPT | Performed by: RADIOLOGY

## 2023-07-07 PROCEDURE — 77014 PR CT GUIDANCE PLACEMENT RAD THERAPY FIELDS: CPT | Mod: 26 | Performed by: RADIOLOGY

## 2023-07-10 ENCOUNTER — HOSPITAL ENCOUNTER (OUTPATIENT)
Dept: RADIATION ONCOLOGY | Facility: MEDICAL CENTER | Age: 83
End: 2023-07-10
Payer: MEDICARE

## 2023-07-10 LAB
CHEMOTHERAPY INFUSION START DATE: NORMAL
CHEMOTHERAPY RECORDS: 2
CHEMOTHERAPY RECORDS: 6000
CHEMOTHERAPY RECORDS: NORMAL
CHEMOTHERAPY RX CANCER: NORMAL
DATE 1ST CHEMO CANCER: NORMAL
RAD ONC ARIA COURSE LAST TREATMENT DATE: NORMAL
RAD ONC ARIA COURSE TREATMENT ELAPSED DAYS: NORMAL
RAD ONC ARIA REFERENCE POINT DOSAGE GIVEN TO DATE: 30
RAD ONC ARIA REFERENCE POINT DOSAGE GIVEN TO DATE: 30.89
RAD ONC ARIA REFERENCE POINT ID: NORMAL
RAD ONC ARIA REFERENCE POINT ID: NORMAL
RAD ONC ARIA REFERENCE POINT SESSION DOSAGE GIVEN: 2
RAD ONC ARIA REFERENCE POINT SESSION DOSAGE GIVEN: 2.06

## 2023-07-10 PROCEDURE — 77427 RADIATION TX MANAGEMENT X5: CPT | Performed by: RADIOLOGY

## 2023-07-10 PROCEDURE — 77014 PR CT GUIDANCE PLACEMENT RAD THERAPY FIELDS: CPT | Mod: 26 | Performed by: RADIOLOGY

## 2023-07-10 PROCEDURE — 77386 HCHG IMRT DELIVERY COMPLEX: CPT | Performed by: RADIOLOGY

## 2023-07-11 ENCOUNTER — HOSPITAL ENCOUNTER (OUTPATIENT)
Dept: RADIATION ONCOLOGY | Facility: MEDICAL CENTER | Age: 83
End: 2023-07-11
Payer: MEDICARE

## 2023-07-11 LAB
CHEMOTHERAPY INFUSION START DATE: NORMAL
CHEMOTHERAPY RECORDS: 2
CHEMOTHERAPY RECORDS: 6000
CHEMOTHERAPY RECORDS: NORMAL
CHEMOTHERAPY RX CANCER: NORMAL
DATE 1ST CHEMO CANCER: NORMAL
RAD ONC ARIA COURSE LAST TREATMENT DATE: NORMAL
RAD ONC ARIA COURSE TREATMENT ELAPSED DAYS: NORMAL
RAD ONC ARIA REFERENCE POINT DOSAGE GIVEN TO DATE: 32
RAD ONC ARIA REFERENCE POINT DOSAGE GIVEN TO DATE: 32.95
RAD ONC ARIA REFERENCE POINT ID: NORMAL
RAD ONC ARIA REFERENCE POINT ID: NORMAL
RAD ONC ARIA REFERENCE POINT SESSION DOSAGE GIVEN: 2
RAD ONC ARIA REFERENCE POINT SESSION DOSAGE GIVEN: 2.06

## 2023-07-11 PROCEDURE — 77386 HCHG IMRT DELIVERY COMPLEX: CPT | Performed by: RADIOLOGY

## 2023-07-11 PROCEDURE — 77014 PR CT GUIDANCE PLACEMENT RAD THERAPY FIELDS: CPT | Mod: 26 | Performed by: RADIOLOGY

## 2023-07-12 ENCOUNTER — HOSPITAL ENCOUNTER (OUTPATIENT)
Dept: RADIATION ONCOLOGY | Facility: MEDICAL CENTER | Age: 83
End: 2023-07-12
Payer: MEDICARE

## 2023-07-12 VITALS
WEIGHT: 151 LBS | TEMPERATURE: 97.6 F | SYSTOLIC BLOOD PRESSURE: 130 MMHG | OXYGEN SATURATION: 96 % | BODY MASS INDEX: 26.75 KG/M2 | HEART RATE: 78 BPM | DIASTOLIC BLOOD PRESSURE: 102 MMHG

## 2023-07-12 LAB
CHEMOTHERAPY INFUSION START DATE: NORMAL
CHEMOTHERAPY RECORDS: 2
CHEMOTHERAPY RECORDS: 6000
CHEMOTHERAPY RECORDS: NORMAL
CHEMOTHERAPY RX CANCER: NORMAL
DATE 1ST CHEMO CANCER: NORMAL
RAD ONC ARIA COURSE LAST TREATMENT DATE: NORMAL
RAD ONC ARIA COURSE TREATMENT ELAPSED DAYS: NORMAL
RAD ONC ARIA REFERENCE POINT DOSAGE GIVEN TO DATE: 34
RAD ONC ARIA REFERENCE POINT DOSAGE GIVEN TO DATE: 35.01
RAD ONC ARIA REFERENCE POINT ID: NORMAL
RAD ONC ARIA REFERENCE POINT ID: NORMAL
RAD ONC ARIA REFERENCE POINT SESSION DOSAGE GIVEN: 2
RAD ONC ARIA REFERENCE POINT SESSION DOSAGE GIVEN: 2.06

## 2023-07-12 PROCEDURE — 77014 PR CT GUIDANCE PLACEMENT RAD THERAPY FIELDS: CPT | Mod: 26 | Performed by: RADIOLOGY

## 2023-07-12 PROCEDURE — 77386 HCHG IMRT DELIVERY COMPLEX: CPT | Performed by: RADIOLOGY

## 2023-07-12 ASSESSMENT — PAIN SCALES - GENERAL: PAINLEVEL: NO PAIN

## 2023-07-12 ASSESSMENT — FIBROSIS 4 INDEX: FIB4 SCORE: 1.81

## 2023-07-12 NOTE — ON TREATMENT VISIT
ON TREATMENT  NOTE  RADIATION ONCOLOGY DEPARTMENT    Patient name:  Wally Joel    Primary Physician:  Pcp Pt States None MRN: [id number]  CSN: [id number]   Referring physician:  Jose R Wright M.D.   : 1940, 83 y.o.     ENCOUNTER DATE:  2023      DIAGNOSIS:  SCC (squamous cell carcinoma), ear, left  Staging form: Cutaneous Carcinoma of the Head and Neck, AJCC 8th Edition.  - Pathologic stage from 2023: Stage III (pT3, pN1, cM0) - Signed by Lupe PATEL M.D. on 2023  Histopathologic type: Squamous cell carcinoma, NOS  Histologic grade (G): G3  Histologic grading system: 4 grade system  Residual tumor (R): R0 - None  Laterality: Left  Lymph-vascular invasion (LVI): LVI not present (absent)/not identified  Extraosseous extension: Absent      TREATMENT SUMMARY:  Course First Treatment Date 2023  Course Last Treatment Date 2023  Aria Treatment Information          2023   Aria Course Treatment Dates   Course First Treatment Date 2023    Course Last Treatment Date 2023    Aria Treatment Summary   L_Ear_Neck  Plan from Course C1_L ear&neck   Fraction 17 of 30   Elapsed Course Days    Prescribed Fraction Dose 200 cGy   Prescribed Total Dose 6,000 cGy   L_Ear_Neck  Reference Point from Course C1_L ear&neck   Elapsed Course Days    Session Dose 200 cGy   Total Dose 3,400 cGy   L_Ear_Neck CP  Reference Point from Course C1_L ear&neck   Elapsed Course Days    Session Dose 206 cGy   Total Dose 3,501 cGy          SUBJECTIVE:  Tolerating treatments well.    VITAL SIGNS:      2023     9:33 AM 2023     9:42 AM 2023     9:33 AM 2023     9:42 AM 2023     3:04 PM 2023     4:30 PM 2023     4:15 PM   Vitals   SYSTOLIC 130 120 124 114 119  135   DIASTOLIC 102 68 59 69 64  72   Pulse 78 61 77 74 78  72   Temperature 36.4 °C (97.6 °F) 36.1 °C (96.9 °F) 36.2 °C (97.1 °F) 36.3 °C (97.3 °F)  "36.4 °C (97.6 °F)  36.6 °C (97.8 °F)   Respiration       15   Weight 151 150 151 149.9 153.22     Height     1.6 m (5' 3\")     BMI 26.75 kg/m2 26.57 kg/m2 26.75 kg/m2 26.55 kg/m2 27.14 kg/m2     Pulse Oximetry 96 % 97 % 96 % 95 % 94 % 94 % 95 %     KPS: 70, Cares for self; unable to carry on normal activity or to do active work (ECOG equivalent 1)  Encounter Vitals  Temperature: 36.4 °C (97.6 °F)  Temp src: Temporal  Blood Pressure : (!) 130/102  Pulse: 78  Pulse Oximetry: 96 %  Weight: 68.5 kg (151 lb)  Pain Score: No pain      7/12/2023     9:33 AM 7/5/2023     9:42 AM 6/28/2023     9:33 AM 6/21/2023     9:42 AM 6/6/2023     3:04 PM   Pain Assessment   Pain Score NO PAIN NO PAIN NO PAIN NO PAIN NO PAIN          PHYSICAL EXAM:  Physical Exam  Vitals and nursing note reviewed.   Constitutional:       General: He is not in acute distress.     Appearance: He is well-developed.   HENT:      Head: Normocephalic.   Skin:     General: Skin is warm and dry.      Findings: Erythema present.   Neurological:      Mental Status: He is alert and oriented to person, place, and time.   Psychiatric:         Behavior: Behavior normal.         Thought Content: Thought content normal.         Judgment: Judgment normal.              7/12/2023     9:35 AM 7/5/2023     9:43 AM 6/28/2023     9:34 AM 6/21/2023     9:44 AM   Toxicity Assessment   Toxicity Assessment Skin Skin Skin Skin   Fatigue (lethargy, malaise, asthenia) Increased fatigue over baseline, but not altering normal activities None None Increased fatigue over baseline, but not altering normal activities   Radiation Dermatitis None None None None   Photosensitivity None None None None   Dry Skin Normal Normal Normal Normal   Alopecia Normal Normal Normal Normal   Erythema Multiforme (e.g., Ro-Erik syndrome, toxic epidermal necrolysis) Absent Absent Absent Absent   Nail Changes Normal Normal Normal Normal   Wound (Non-Infectious) None None None None   Wound " (Infectious) None None None    RT - Pain due to RT None None None None   Tumor Pain (onset or exacerbation of tumor pain due to treatment) None None None None   Skin - Late RT  No change from baseline No change from baseline No change from baseline       CURRENT MEDICATIONS:    Current Outpatient Medications:     Zanubrutinib (BRUKINSA) 80 MG Cap, 2 tab bid, Disp: 120 Capsule, Rfl: 1    Zanubrutinib (BRUKINSA) 80 MG Cap, Take 2 capsules by mouth twice daily, Disp: 120 Capsule, Rfl: 1    Degarelix Acetate (FIRMAGON, 240 MG DOSE, SC), Inject  under the skin every 6 months. Pt unkn dose, Disp: , Rfl:     BRUKINSA 80 MG Cap, Take 80 mg by mouth 2 times a day., Disp: , Rfl:     Leuprolide Acetate, 6 Month, (LUPRON DEPOT, 6-MONTH, IM), Inject 45 mg into the shoulder, thigh, or buttocks., Disp: , Rfl:     LABORATORY DATA:   Lab Results   Component Value Date/Time    SODIUM 137 05/05/2023 01:50 PM    POTASSIUM 4.5 05/05/2023 01:50 PM    CHLORIDE 104 05/05/2023 01:50 PM    CO2 22 05/05/2023 01:50 PM    GLUCOSE 129 (H) 05/05/2023 01:50 PM    BUN 25 (H) 05/05/2023 01:50 PM    CREATININE 1.44 (H) 05/05/2023 01:50 PM       Lab Results   Component Value Date/Time    WBC 62.3 (HH) 04/24/2023 11:17 AM    RBC 4.19 (L) 04/24/2023 11:17 AM    HEMOGLOBIN 10.5 (L) 04/24/2023 11:17 AM    HEMATOCRIT 34.6 (L) 04/24/2023 11:17 AM    MCV 82.6 04/24/2023 11:17 AM    MCH 25.1 (L) 04/24/2023 11:17 AM    MCHC 30.3 (L) 04/24/2023 11:17 AM    PLATELETCT 165 04/24/2023 11:17 AM         RADIOLOGY DATA:  No results found.    IMPRESSION:  Cancer Staging   SCC (squamous cell carcinoma), ear, left  Staging form: Cutaneous Carcinoma of the Head and Neck, AJCC 8th Edition.  - Pathologic stage from 6/6/2023: Stage III (pT3, pN1, cM0) - Signed by Lupe PATEL M.D. on 6/6/2023      PLAN:  No change in treatment plan    Disposition:  Treatment plan and imaging reviewed. Questions answered. Continue therapy outlined.     Lupe PATEL M.D.    No  orders of the defined types were placed in this encounter.

## 2023-07-13 ENCOUNTER — HOSPITAL ENCOUNTER (OUTPATIENT)
Dept: RADIATION ONCOLOGY | Facility: MEDICAL CENTER | Age: 83
End: 2023-07-13
Payer: MEDICARE

## 2023-07-13 LAB
CHEMOTHERAPY INFUSION START DATE: NORMAL
CHEMOTHERAPY RECORDS: 2
CHEMOTHERAPY RECORDS: 6000
CHEMOTHERAPY RECORDS: NORMAL
CHEMOTHERAPY RX CANCER: NORMAL
DATE 1ST CHEMO CANCER: NORMAL
RAD ONC ARIA COURSE LAST TREATMENT DATE: NORMAL
RAD ONC ARIA COURSE TREATMENT ELAPSED DAYS: NORMAL
RAD ONC ARIA REFERENCE POINT DOSAGE GIVEN TO DATE: 36
RAD ONC ARIA REFERENCE POINT DOSAGE GIVEN TO DATE: 37.07
RAD ONC ARIA REFERENCE POINT ID: NORMAL
RAD ONC ARIA REFERENCE POINT ID: NORMAL
RAD ONC ARIA REFERENCE POINT SESSION DOSAGE GIVEN: 2
RAD ONC ARIA REFERENCE POINT SESSION DOSAGE GIVEN: 2.06

## 2023-07-13 PROCEDURE — 77386 HCHG IMRT DELIVERY COMPLEX: CPT | Performed by: RADIOLOGY

## 2023-07-13 PROCEDURE — 77336 RADIATION PHYSICS CONSULT: CPT | Performed by: RADIOLOGY

## 2023-07-13 PROCEDURE — 77014 PR CT GUIDANCE PLACEMENT RAD THERAPY FIELDS: CPT | Mod: 26 | Performed by: RADIOLOGY

## 2023-07-14 ENCOUNTER — HOSPITAL ENCOUNTER (OUTPATIENT)
Dept: RADIATION ONCOLOGY | Facility: MEDICAL CENTER | Age: 83
End: 2023-07-14
Payer: MEDICARE

## 2023-07-14 LAB
CHEMOTHERAPY INFUSION START DATE: NORMAL
CHEMOTHERAPY RECORDS: 2
CHEMOTHERAPY RECORDS: 6000
CHEMOTHERAPY RECORDS: NORMAL
CHEMOTHERAPY RX CANCER: NORMAL
DATE 1ST CHEMO CANCER: NORMAL
RAD ONC ARIA COURSE LAST TREATMENT DATE: NORMAL
RAD ONC ARIA COURSE TREATMENT ELAPSED DAYS: NORMAL
RAD ONC ARIA REFERENCE POINT DOSAGE GIVEN TO DATE: 38
RAD ONC ARIA REFERENCE POINT DOSAGE GIVEN TO DATE: 39.13
RAD ONC ARIA REFERENCE POINT ID: NORMAL
RAD ONC ARIA REFERENCE POINT ID: NORMAL
RAD ONC ARIA REFERENCE POINT SESSION DOSAGE GIVEN: 2
RAD ONC ARIA REFERENCE POINT SESSION DOSAGE GIVEN: 2.06

## 2023-07-14 PROCEDURE — 77014 PR CT GUIDANCE PLACEMENT RAD THERAPY FIELDS: CPT | Mod: 26 | Performed by: RADIOLOGY

## 2023-07-14 PROCEDURE — 77386 HCHG IMRT DELIVERY COMPLEX: CPT | Performed by: RADIOLOGY

## 2023-07-17 ENCOUNTER — HOSPITAL ENCOUNTER (OUTPATIENT)
Dept: RADIATION ONCOLOGY | Facility: MEDICAL CENTER | Age: 83
End: 2023-07-17
Payer: MEDICARE

## 2023-07-17 LAB
CHEMOTHERAPY INFUSION START DATE: NORMAL
CHEMOTHERAPY RECORDS: 2
CHEMOTHERAPY RECORDS: 6000
CHEMOTHERAPY RECORDS: NORMAL
CHEMOTHERAPY RX CANCER: NORMAL
DATE 1ST CHEMO CANCER: NORMAL
RAD ONC ARIA COURSE LAST TREATMENT DATE: NORMAL
RAD ONC ARIA COURSE TREATMENT ELAPSED DAYS: NORMAL
RAD ONC ARIA REFERENCE POINT DOSAGE GIVEN TO DATE: 40
RAD ONC ARIA REFERENCE POINT DOSAGE GIVEN TO DATE: 41.19
RAD ONC ARIA REFERENCE POINT ID: NORMAL
RAD ONC ARIA REFERENCE POINT ID: NORMAL
RAD ONC ARIA REFERENCE POINT SESSION DOSAGE GIVEN: 2
RAD ONC ARIA REFERENCE POINT SESSION DOSAGE GIVEN: 2.06

## 2023-07-17 PROCEDURE — 77014 PR CT GUIDANCE PLACEMENT RAD THERAPY FIELDS: CPT | Mod: 26 | Performed by: RADIOLOGY

## 2023-07-17 PROCEDURE — 77386 HCHG IMRT DELIVERY COMPLEX: CPT | Performed by: RADIOLOGY

## 2023-07-17 PROCEDURE — 77427 RADIATION TX MANAGEMENT X5: CPT | Performed by: RADIOLOGY

## 2023-07-18 ENCOUNTER — HOSPITAL ENCOUNTER (OUTPATIENT)
Dept: RADIATION ONCOLOGY | Facility: MEDICAL CENTER | Age: 83
End: 2023-07-18
Payer: MEDICARE

## 2023-07-18 LAB
CHEMOTHERAPY INFUSION START DATE: NORMAL
CHEMOTHERAPY RECORDS: 2
CHEMOTHERAPY RECORDS: 6000
CHEMOTHERAPY RECORDS: NORMAL
CHEMOTHERAPY RX CANCER: NORMAL
DATE 1ST CHEMO CANCER: NORMAL
RAD ONC ARIA COURSE LAST TREATMENT DATE: NORMAL
RAD ONC ARIA COURSE TREATMENT ELAPSED DAYS: NORMAL
RAD ONC ARIA REFERENCE POINT DOSAGE GIVEN TO DATE: 42
RAD ONC ARIA REFERENCE POINT DOSAGE GIVEN TO DATE: 43.25
RAD ONC ARIA REFERENCE POINT ID: NORMAL
RAD ONC ARIA REFERENCE POINT ID: NORMAL
RAD ONC ARIA REFERENCE POINT SESSION DOSAGE GIVEN: 2
RAD ONC ARIA REFERENCE POINT SESSION DOSAGE GIVEN: 2.06

## 2023-07-18 PROCEDURE — 77014 PR CT GUIDANCE PLACEMENT RAD THERAPY FIELDS: CPT | Mod: 26 | Performed by: RADIOLOGY

## 2023-07-18 PROCEDURE — 77386 HCHG IMRT DELIVERY COMPLEX: CPT | Performed by: RADIOLOGY

## 2023-07-19 ENCOUNTER — HOSPITAL ENCOUNTER (OUTPATIENT)
Dept: RADIATION ONCOLOGY | Facility: MEDICAL CENTER | Age: 83
End: 2023-07-19
Payer: MEDICARE

## 2023-07-19 VITALS
TEMPERATURE: 97.2 F | WEIGHT: 150 LBS | OXYGEN SATURATION: 93 % | HEART RATE: 73 BPM | BODY MASS INDEX: 26.57 KG/M2 | DIASTOLIC BLOOD PRESSURE: 73 MMHG | SYSTOLIC BLOOD PRESSURE: 124 MMHG

## 2023-07-19 DIAGNOSIS — C44.229 SCC (SQUAMOUS CELL CARCINOMA), EAR, LEFT: ICD-10-CM

## 2023-07-19 LAB
CHEMOTHERAPY INFUSION START DATE: NORMAL
CHEMOTHERAPY RECORDS: 2
CHEMOTHERAPY RECORDS: 6000
CHEMOTHERAPY RECORDS: NORMAL
CHEMOTHERAPY RX CANCER: NORMAL
DATE 1ST CHEMO CANCER: NORMAL
RAD ONC ARIA COURSE LAST TREATMENT DATE: NORMAL
RAD ONC ARIA COURSE TREATMENT ELAPSED DAYS: NORMAL
RAD ONC ARIA REFERENCE POINT DOSAGE GIVEN TO DATE: 44
RAD ONC ARIA REFERENCE POINT DOSAGE GIVEN TO DATE: 45.3
RAD ONC ARIA REFERENCE POINT ID: NORMAL
RAD ONC ARIA REFERENCE POINT ID: NORMAL
RAD ONC ARIA REFERENCE POINT SESSION DOSAGE GIVEN: 2
RAD ONC ARIA REFERENCE POINT SESSION DOSAGE GIVEN: 2.06

## 2023-07-19 PROCEDURE — 77014 PR CT GUIDANCE PLACEMENT RAD THERAPY FIELDS: CPT | Mod: 26 | Performed by: RADIOLOGY

## 2023-07-19 PROCEDURE — 77386 HCHG IMRT DELIVERY COMPLEX: CPT | Performed by: RADIOLOGY

## 2023-07-19 PROCEDURE — RXMED WILLOW AMBULATORY MEDICATION CHARGE: Performed by: RADIOLOGY

## 2023-07-19 ASSESSMENT — PAIN SCALES - GENERAL: PAINLEVEL: NO PAIN

## 2023-07-19 ASSESSMENT — FIBROSIS 4 INDEX: FIB4 SCORE: 1.81

## 2023-07-19 NOTE — ON TREATMENT VISIT
"ON TREATMENT  NOTE  RADIATION ONCOLOGY DEPARTMENT    Patient name:  Wally Joel    Primary Physician:  Pcp Pt States None MRN: [id number]  CSN: [id number]   Referring physician:  Jose R Wright M.D.   : 1940, 83 y.o.     ENCOUNTER DATE:  2023      DIAGNOSIS:  SCC (squamous cell carcinoma), ear, left  Staging form: Cutaneous Carcinoma of the Head and Neck, AJCC 8th Edition.  - Pathologic stage from 2023: Stage III (pT3, pN1, cM0) - Signed by Lupe PATEL M.D. on 2023  Histopathologic type: Squamous cell carcinoma, NOS  Histologic grade (G): G3  Histologic grading system: 4 grade system  Residual tumor (R): R0 - None  Laterality: Left  Lymph-vascular invasion (LVI): LVI not present (absent)/not identified  Extraosseous extension: Absent      TREATMENT SUMMARY:  Course First Treatment Date 2023  Course Last Treatment Date 2023  Radiation Treatments       Active   Plans   L_Ear_Neck   Most recent treatment: Dose planned: 200 cGy (fraction 22 of 30 on 2023)   Total: Dose planned: 6,000 cGy   Elapsed Days: 30 @ [id number]           Historical   No historical radiation treatments to show.                 SUBJECTIVE:  Moderate erythema    VITAL SIGNS:      2023     9:36 AM 2023     9:33 AM 2023     9:42 AM 2023     9:33 AM 2023     9:42 AM 2023     3:04 PM 2023     4:30 PM   Vitals   SYSTOLIC 124 130 120 124 114 119    DIASTOLIC 73 102 68 59 69 64    Pulse 73 78 61 77 74 78    Temperature 36.2 °C (97.2 °F) 36.4 °C (97.6 °F) 36.1 °C (96.9 °F) 36.2 °C (97.1 °F) 36.3 °C (97.3 °F) 36.4 °C (97.6 °F)    Weight 150 151 150 151 149.9 153.22    Height      1.6 m (5' 3\")    BMI 26.57 kg/m2 26.75 kg/m2 26.57 kg/m2 26.75 kg/m2 26.55 kg/m2 27.14 kg/m2    Pulse Oximetry 93 % 96 % 97 % 96 % 95 % 94 % 94 %     KPS: 90, Able to carry on normal activity; minor signs or symptoms of disease (ECOG equivalent 0)  Encounter Vitals  Temperature: 36.2 °C (97.2 " °F)  Temp src: Temporal  Blood Pressure : 124/73  Pulse: 73  Pulse Oximetry: 93 %  Weight: 68 kg (150 lb)  Pain Score: No pain      7/19/2023     9:36 AM 7/12/2023     9:33 AM 7/5/2023     9:42 AM 6/28/2023     9:33 AM 6/21/2023     9:42 AM 6/6/2023     3:04 PM   Pain Assessment   Pain Score NO PAIN NO PAIN NO PAIN NO PAIN NO PAIN NO PAIN          PHYSICAL EXAM:  Physical Exam  Skin:     Findings: Erythema present.              7/19/2023     9:37 AM 7/12/2023     9:35 AM 7/5/2023     9:43 AM 6/28/2023     9:34 AM 6/21/2023     9:44 AM   Toxicity Assessment   Toxicity Assessment Skin Skin Skin Skin Skin   Fatigue (lethargy, malaise, asthenia) Increased fatigue over baseline, but not altering normal activities Increased fatigue over baseline, but not altering normal activities None None Increased fatigue over baseline, but not altering normal activities   Radiation Dermatitis Moderate to brisk erythema or a patchy moist desquamation, mostly confined to skin folds and creases, with/without moderate edema None None None None   Photosensitivity None None None None None   Dry Skin Normal Normal Normal Normal Normal   Alopecia Normal Normal Normal Normal Normal   Erythema Multiforme (e.g., Ro-Erik syndrome, toxic epidermal necrolysis) Absent Absent Absent Absent Absent   Nail Changes Normal Normal Normal Normal Normal   Wound (Non-Infectious) None None None None None   Wound (Infectious) None None None None    RT - Pain due to RT None None None None None   Tumor Pain (onset or exacerbation of tumor pain due to treatment) None None None None None   Skin - Late RT Slight atrophy, pigmentation change, some hair loss  No change from baseline No change from baseline No change from baseline       CURRENT MEDICATIONS:    Current Outpatient Medications:     silver sulfADIAZINE (SILVADENE) 1 % Cream, Apply 1/8 inch layer to affected area BID, Disp: 400 g, Rfl: 2    Zanubrutinib (BRUKINSA) 80 MG Cap, 2 tab bid, Disp: 120  Capsule, Rfl: 1    Zanubrutinib (BRUKINSA) 80 MG Cap, Take 2 capsules by mouth twice daily, Disp: 120 Capsule, Rfl: 1    Degarelix Acetate (FIRMAGON, 240 MG DOSE, SC), Inject  under the skin every 6 months. Pt unkn dose, Disp: , Rfl:     BRUKINSA 80 MG Cap, Take 80 mg by mouth 2 times a day., Disp: , Rfl:     Leuprolide Acetate, 6 Month, (LUPRON DEPOT, 6-MONTH, IM), Inject 45 mg into the shoulder, thigh, or buttocks., Disp: , Rfl:     LABORATORY DATA:   Lab Results   Component Value Date/Time    SODIUM 137 05/05/2023 01:50 PM    POTASSIUM 4.5 05/05/2023 01:50 PM    CHLORIDE 104 05/05/2023 01:50 PM    CO2 22 05/05/2023 01:50 PM    GLUCOSE 129 (H) 05/05/2023 01:50 PM    BUN 25 (H) 05/05/2023 01:50 PM    CREATININE 1.44 (H) 05/05/2023 01:50 PM       Lab Results   Component Value Date/Time    WBC 62.3 (HH) 04/24/2023 11:17 AM    RBC 4.19 (L) 04/24/2023 11:17 AM    HEMOGLOBIN 10.5 (L) 04/24/2023 11:17 AM    HEMATOCRIT 34.6 (L) 04/24/2023 11:17 AM    MCV 82.6 04/24/2023 11:17 AM    MCH 25.1 (L) 04/24/2023 11:17 AM    MCHC 30.3 (L) 04/24/2023 11:17 AM    PLATELETCT 165 04/24/2023 11:17 AM         RADIOLOGY DATA:  No results found.    IMPRESSION:  Cancer Staging   SCC (squamous cell carcinoma), ear, left  Staging form: Cutaneous Carcinoma of the Head and Neck, AJCC 8th Edition.  - Pathologic stage from 6/6/2023: Stage III (pT3, pN1, cM0) - Signed by Lupe PATEL M.D. on 6/6/2023      PLAN:  No change in treatment plan    Disposition:  Treatment plan and imaging reviewed. Questions answered. Continue therapy outlined.     Gian Bustillo M.D.    Orders Placed This Encounter    silver sulfADIAZINE (SILVADENE) 1 % Cream

## 2023-07-20 ENCOUNTER — HOSPITAL ENCOUNTER (OUTPATIENT)
Dept: RADIATION ONCOLOGY | Facility: MEDICAL CENTER | Age: 83
End: 2023-07-20
Payer: MEDICARE

## 2023-07-20 ENCOUNTER — PHARMACY VISIT (OUTPATIENT)
Dept: PHARMACY | Facility: MEDICAL CENTER | Age: 83
End: 2023-07-20
Payer: COMMERCIAL

## 2023-07-20 ENCOUNTER — DOCUMENTATION (OUTPATIENT)
Dept: RADIATION ONCOLOGY | Facility: MEDICAL CENTER | Age: 83
End: 2023-07-20
Payer: MEDICARE

## 2023-07-20 LAB
CHEMOTHERAPY INFUSION START DATE: NORMAL
CHEMOTHERAPY RECORDS: 2
CHEMOTHERAPY RECORDS: 6000
CHEMOTHERAPY RECORDS: NORMAL
CHEMOTHERAPY RX CANCER: NORMAL
DATE 1ST CHEMO CANCER: NORMAL
RAD ONC ARIA COURSE LAST TREATMENT DATE: NORMAL
RAD ONC ARIA COURSE TREATMENT ELAPSED DAYS: NORMAL
RAD ONC ARIA REFERENCE POINT DOSAGE GIVEN TO DATE: 46
RAD ONC ARIA REFERENCE POINT DOSAGE GIVEN TO DATE: 47.36
RAD ONC ARIA REFERENCE POINT ID: NORMAL
RAD ONC ARIA REFERENCE POINT ID: NORMAL
RAD ONC ARIA REFERENCE POINT SESSION DOSAGE GIVEN: 2
RAD ONC ARIA REFERENCE POINT SESSION DOSAGE GIVEN: 2.06

## 2023-07-20 PROCEDURE — 77386 HCHG IMRT DELIVERY COMPLEX: CPT | Performed by: RADIOLOGY

## 2023-07-20 PROCEDURE — 77014 PR CT GUIDANCE PLACEMENT RAD THERAPY FIELDS: CPT | Mod: 26 | Performed by: RADIOLOGY

## 2023-07-20 PROCEDURE — 77336 RADIATION PHYSICS CONSULT: CPT | Performed by: RADIOLOGY

## 2023-07-20 NOTE — PROGRESS NOTES
Nutrition Services: Brief Update  Wt Readings from Last 7 Encounters:   07/19/23 68 kg (150 lb)   07/12/23 68.5 kg (151 lb)   07/05/23 68 kg (150 lb)   06/21/23 68 kg (149 lb 14.4 oz)   06/06/23 69.5 kg (153 lb 3.5 oz)   06/28/23 68.5 kg (151 lb)   04/24/23 67.2 kg (148 lb 2.4 oz)     Weight Change: Wt stable    Pertinent Recent Lab work (DATE 5/23): Glu 129    RD able to visit pt following radiation therapy. Pt reports continuing Ensure Plus x2 per day. No questions or concerns from RD at this time.     Pt verbalizes understanding and is receptive to information provided.   RD available PRN and will make further recommendations as indicated.   [phone]

## 2023-07-21 ENCOUNTER — HOSPITAL ENCOUNTER (OUTPATIENT)
Dept: RADIATION ONCOLOGY | Facility: MEDICAL CENTER | Age: 83
End: 2023-07-21
Payer: MEDICARE

## 2023-07-21 LAB
CHEMOTHERAPY INFUSION START DATE: NORMAL
CHEMOTHERAPY RECORDS: 2
CHEMOTHERAPY RECORDS: 6000
CHEMOTHERAPY RECORDS: NORMAL
CHEMOTHERAPY RX CANCER: NORMAL
DATE 1ST CHEMO CANCER: NORMAL
RAD ONC ARIA COURSE LAST TREATMENT DATE: NORMAL
RAD ONC ARIA COURSE TREATMENT ELAPSED DAYS: NORMAL
RAD ONC ARIA REFERENCE POINT DOSAGE GIVEN TO DATE: 48
RAD ONC ARIA REFERENCE POINT DOSAGE GIVEN TO DATE: 49.42
RAD ONC ARIA REFERENCE POINT ID: NORMAL
RAD ONC ARIA REFERENCE POINT ID: NORMAL
RAD ONC ARIA REFERENCE POINT SESSION DOSAGE GIVEN: 2
RAD ONC ARIA REFERENCE POINT SESSION DOSAGE GIVEN: 2.06

## 2023-07-21 PROCEDURE — 77386 HCHG IMRT DELIVERY COMPLEX: CPT | Performed by: RADIOLOGY

## 2023-07-21 PROCEDURE — 77014 PR CT GUIDANCE PLACEMENT RAD THERAPY FIELDS: CPT | Mod: 26 | Performed by: RADIOLOGY

## 2023-07-24 ENCOUNTER — HOSPITAL ENCOUNTER (OUTPATIENT)
Dept: RADIATION ONCOLOGY | Facility: MEDICAL CENTER | Age: 83
End: 2023-07-24
Payer: MEDICARE

## 2023-07-24 LAB
CHEMOTHERAPY INFUSION START DATE: NORMAL
CHEMOTHERAPY RECORDS: 2
CHEMOTHERAPY RECORDS: 6000
CHEMOTHERAPY RECORDS: NORMAL
CHEMOTHERAPY RX CANCER: NORMAL
DATE 1ST CHEMO CANCER: NORMAL
RAD ONC ARIA COURSE LAST TREATMENT DATE: NORMAL
RAD ONC ARIA COURSE TREATMENT ELAPSED DAYS: NORMAL
RAD ONC ARIA REFERENCE POINT DOSAGE GIVEN TO DATE: 50
RAD ONC ARIA REFERENCE POINT DOSAGE GIVEN TO DATE: 51.48
RAD ONC ARIA REFERENCE POINT ID: NORMAL
RAD ONC ARIA REFERENCE POINT ID: NORMAL
RAD ONC ARIA REFERENCE POINT SESSION DOSAGE GIVEN: 2
RAD ONC ARIA REFERENCE POINT SESSION DOSAGE GIVEN: 2.06

## 2023-07-24 PROCEDURE — 77427 RADIATION TX MANAGEMENT X5: CPT | Performed by: RADIOLOGY

## 2023-07-24 PROCEDURE — 77014 PR CT GUIDANCE PLACEMENT RAD THERAPY FIELDS: CPT | Mod: 26 | Performed by: RADIOLOGY

## 2023-07-24 PROCEDURE — 77386 HCHG IMRT DELIVERY COMPLEX: CPT | Performed by: RADIOLOGY

## 2023-07-25 ENCOUNTER — HOSPITAL ENCOUNTER (OUTPATIENT)
Dept: RADIATION ONCOLOGY | Facility: MEDICAL CENTER | Age: 83
End: 2023-07-25
Payer: MEDICARE

## 2023-07-25 LAB
CHEMOTHERAPY INFUSION START DATE: NORMAL
CHEMOTHERAPY INFUSION START DATE: NORMAL
CHEMOTHERAPY RECORDS: 2
CHEMOTHERAPY RECORDS: 2
CHEMOTHERAPY RECORDS: 6000
CHEMOTHERAPY RECORDS: 6000
CHEMOTHERAPY RECORDS: NORMAL
CHEMOTHERAPY RX CANCER: NORMAL
CHEMOTHERAPY RX CANCER: NORMAL
DATE 1ST CHEMO CANCER: NORMAL
DATE 1ST CHEMO CANCER: NORMAL
RAD ONC ARIA COURSE LAST TREATMENT DATE: NORMAL
RAD ONC ARIA COURSE LAST TREATMENT DATE: NORMAL
RAD ONC ARIA COURSE TREATMENT ELAPSED DAYS: NORMAL
RAD ONC ARIA COURSE TREATMENT ELAPSED DAYS: NORMAL
RAD ONC ARIA REFERENCE POINT DOSAGE GIVEN TO DATE: 52
RAD ONC ARIA REFERENCE POINT DOSAGE GIVEN TO DATE: 53.54
RAD ONC ARIA REFERENCE POINT DOSAGE GIVEN TO DATE: 54
RAD ONC ARIA REFERENCE POINT DOSAGE GIVEN TO DATE: 55.6
RAD ONC ARIA REFERENCE POINT ID: NORMAL
RAD ONC ARIA REFERENCE POINT SESSION DOSAGE GIVEN: 2
RAD ONC ARIA REFERENCE POINT SESSION DOSAGE GIVEN: 2
RAD ONC ARIA REFERENCE POINT SESSION DOSAGE GIVEN: 2.06
RAD ONC ARIA REFERENCE POINT SESSION DOSAGE GIVEN: 2.06

## 2023-07-25 PROCEDURE — 77014 PR CT GUIDANCE PLACEMENT RAD THERAPY FIELDS: CPT | Mod: 26 | Performed by: RADIOLOGY

## 2023-07-25 PROCEDURE — 77386 HCHG IMRT DELIVERY COMPLEX: CPT | Performed by: RADIOLOGY

## 2023-07-26 ENCOUNTER — HOSPITAL ENCOUNTER (OUTPATIENT)
Dept: RADIATION ONCOLOGY | Facility: MEDICAL CENTER | Age: 83
End: 2023-07-26
Payer: MEDICARE

## 2023-07-26 VITALS
DIASTOLIC BLOOD PRESSURE: 67 MMHG | TEMPERATURE: 97.3 F | WEIGHT: 152 LBS | HEART RATE: 82 BPM | SYSTOLIC BLOOD PRESSURE: 113 MMHG | BODY MASS INDEX: 26.93 KG/M2 | OXYGEN SATURATION: 96 %

## 2023-07-26 LAB
CHEMOTHERAPY INFUSION START DATE: NORMAL
CHEMOTHERAPY RECORDS: 2
CHEMOTHERAPY RECORDS: 6000
CHEMOTHERAPY RECORDS: NORMAL
CHEMOTHERAPY RX CANCER: NORMAL
DATE 1ST CHEMO CANCER: NORMAL
RAD ONC ARIA COURSE LAST TREATMENT DATE: NORMAL
RAD ONC ARIA COURSE TREATMENT ELAPSED DAYS: NORMAL
RAD ONC ARIA REFERENCE POINT DOSAGE GIVEN TO DATE: 56
RAD ONC ARIA REFERENCE POINT DOSAGE GIVEN TO DATE: 57.66
RAD ONC ARIA REFERENCE POINT ID: NORMAL
RAD ONC ARIA REFERENCE POINT ID: NORMAL
RAD ONC ARIA REFERENCE POINT SESSION DOSAGE GIVEN: 2
RAD ONC ARIA REFERENCE POINT SESSION DOSAGE GIVEN: 2.06

## 2023-07-26 PROCEDURE — 77386 HCHG IMRT DELIVERY COMPLEX: CPT | Performed by: RADIOLOGY

## 2023-07-26 PROCEDURE — 77014 PR CT GUIDANCE PLACEMENT RAD THERAPY FIELDS: CPT | Mod: 26 | Performed by: RADIOLOGY

## 2023-07-26 PROCEDURE — 77336 RADIATION PHYSICS CONSULT: CPT | Performed by: RADIOLOGY

## 2023-07-26 ASSESSMENT — PAIN SCALES - GENERAL: PAINLEVEL: 3=SLIGHT PAIN

## 2023-07-26 ASSESSMENT — FIBROSIS 4 INDEX: FIB4 SCORE: 1.81

## 2023-07-26 NOTE — ON TREATMENT VISIT
"ON TREATMENT  NOTE  RADIATION ONCOLOGY DEPARTMENT    Patient name:  Wally Joel    Primary Physician:  Pcp Pt States None MRN: [id number]  CSN: [id number]   Referring physician:  Jose R Wright M.D.   : 1940, 83 y.o.     ENCOUNTER DATE:  2023    DIAGNOSIS:  SCC (squamous cell carcinoma), ear, left  Staging form: Cutaneous Carcinoma of the Head and Neck, AJCC 8th Edition.  - Pathologic stage from 2023: Stage III (pT3, pN1, cM0) - Signed by Lupe PATEL M.D. on 2023  Histopathologic type: Squamous cell carcinoma, NOS  Histologic grade (G): G3  Histologic grading system: 4 grade system  Residual tumor (R): R0 - None  Laterality: Left  Lymph-vascular invasion (LVI): LVI not present (absent)/not identified  Extraosseous extension: Absent      TREATMENT SUMMARY:  Course First Treatment Date 2023  Course Last Treatment Date 2023  Radiation Treatments       Active   Plans   L_Ear_Neck   Most recent treatment: Dose planned: 200 cGy (fraction 28 of 30 on 2023)   Total: Dose planned: 6,000 cGy   Elapsed Days: 37 @ [id number]           Historical   No historical radiation treatments to show.                 SUBJECTIVE:  Moderate erythema    VITAL SIGNS:      2023     9:43 AM 2023     9:36 AM 2023     9:33 AM 2023     9:42 AM 2023     9:33 AM 2023     9:42 AM 2023     3:04 PM   Vitals   SYSTOLIC 113 124 130 120 124 114 119   DIASTOLIC 67 73 102 68 59 69 64   Pulse 82 73 78 61 77 74 78   Temperature 36.3 °C (97.3 °F) 36.2 °C (97.2 °F) 36.4 °C (97.6 °F) 36.1 °C (96.9 °F) 36.2 °C (97.1 °F) 36.3 °C (97.3 °F) 36.4 °C (97.6 °F)   Weight 152 150 151 150 151 149.9 153.22   Height       1.6 m (5' 3\")   BMI 26.93 kg/m2 26.57 kg/m2 26.75 kg/m2 26.57 kg/m2 26.75 kg/m2 26.55 kg/m2 27.14 kg/m2   Pulse Oximetry 96 % 93 % 96 % 97 % 96 % 95 % 94 %     KPS: 100, Fully active, able to carry on all pre-disease performed without restriction (ECOG equivalent " 0)  Encounter Vitals  Temperature: 36.3 °C (97.3 °F)  Temp src: Temporal  Blood Pressure : 113/67  Pulse: 82  Pulse Oximetry: 96 %  Weight: 68.9 kg (152 lb)  Pain Score: 3=Slight Pain      7/26/2023     9:43 AM 7/19/2023     9:36 AM 7/12/2023     9:33 AM 7/5/2023     9:42 AM 6/28/2023     9:33 AM 6/21/2023     9:42 AM   Pain Assessment   Pain Score 3=SLIGHT SANGITA NO PAIN NO PAIN NO PAIN NO PAIN NO PAIN          PHYSICAL EXAM:  Physical Exam  Skin:     Findings: Erythema present.              7/26/2023     9:45 AM 7/19/2023     9:37 AM 7/12/2023     9:35 AM 7/5/2023     9:43 AM 6/28/2023     9:34 AM 6/21/2023     9:44 AM   Toxicity Assessment   Toxicity Assessment Skin Skin Skin Skin Skin Skin   Fatigue (lethargy, malaise, asthenia) None Increased fatigue over baseline, but not altering normal activities Increased fatigue over baseline, but not altering normal activities None None Increased fatigue over baseline, but not altering normal activities   Radiation Dermatitis Confluent moist desquamation 1.5 cm diameter or more and not confined to skin folds, with/without pitting edema Moderate to brisk erythema or a patchy moist desquamation, mostly confined to skin folds and creases, with/without moderate edema None None None None   Photosensitivity None None None None None None   Dry Skin Normal Normal Normal Normal Normal Normal   Alopecia Pronounced hair loss Normal Normal Normal Normal Normal   Erythema Multiforme (e.g., Ro-Erik syndrome, toxic epidermal necrolysis) Absent Absent Absent Absent Absent Absent   Nail Changes Normal Normal Normal Normal Normal Normal   Wound (Non-Infectious) None None None None None None   Wound (Infectious) None None None None None    RT - Pain due to RT Mild pain not interfering with function None None None None None   Tumor Pain (onset or exacerbation of tumor pain due to treatment) None None None None None None   Skin - Late RT Patchy atrophy, moderate telangiectasia, total  hair loss Slight atrophy, pigmentation change, some hair loss  No change from baseline No change from baseline No change from baseline       CURRENT MEDICATIONS:    Current Outpatient Medications:     silver sulfADIAZINE (SILVADENE) 1 % Cream, Apply 1/8 inch layer to affected area BID, Disp: 400 g, Rfl: 2    Zanubrutinib (BRUKINSA) 80 MG Cap, 2 tab bid, Disp: 120 Capsule, Rfl: 1    Zanubrutinib (BRUKINSA) 80 MG Cap, Take 2 capsules by mouth twice daily, Disp: 120 Capsule, Rfl: 1    Degarelix Acetate (FIRMAGON, 240 MG DOSE, SC), Inject  under the skin every 6 months. Pt unkn dose, Disp: , Rfl:     BRUKINSA 80 MG Cap, Take 80 mg by mouth 2 times a day., Disp: , Rfl:     Leuprolide Acetate, 6 Month, (LUPRON DEPOT, 6-MONTH, IM), Inject 45 mg into the shoulder, thigh, or buttocks., Disp: , Rfl:     LABORATORY DATA:   Lab Results   Component Value Date/Time    SODIUM 137 05/05/2023 01:50 PM    POTASSIUM 4.5 05/05/2023 01:50 PM    CHLORIDE 104 05/05/2023 01:50 PM    CO2 22 05/05/2023 01:50 PM    GLUCOSE 129 (H) 05/05/2023 01:50 PM    BUN 25 (H) 05/05/2023 01:50 PM    CREATININE 1.44 (H) 05/05/2023 01:50 PM       Lab Results   Component Value Date/Time    WBC 62.3 (HH) 04/24/2023 11:17 AM    RBC 4.19 (L) 04/24/2023 11:17 AM    HEMOGLOBIN 10.5 (L) 04/24/2023 11:17 AM    HEMATOCRIT 34.6 (L) 04/24/2023 11:17 AM    MCV 82.6 04/24/2023 11:17 AM    MCH 25.1 (L) 04/24/2023 11:17 AM    MCHC 30.3 (L) 04/24/2023 11:17 AM    PLATELETCT 165 04/24/2023 11:17 AM         RADIOLOGY DATA:  No results found.    IMPRESSION:  Cancer Staging   SCC (squamous cell carcinoma), ear, left  Staging form: Cutaneous Carcinoma of the Head and Neck, AJCC 8th Edition.  - Pathologic stage from 6/6/2023: Stage III (pT3, pN1, cM0) - Signed by Lupe PATEL M.D. on 6/6/2023      PLAN:  No change in treatment plan    Disposition:  Treatment plan and imaging reviewed. Questions answered. Continue therapy outlined.     Gian Bustillo M.D.    No orders of  the defined types were placed in this encounter.

## 2023-07-27 ENCOUNTER — HOSPITAL ENCOUNTER (OUTPATIENT)
Dept: RADIATION ONCOLOGY | Facility: MEDICAL CENTER | Age: 83
End: 2023-07-27
Payer: MEDICARE

## 2023-07-27 LAB
CHEMOTHERAPY INFUSION START DATE: NORMAL
CHEMOTHERAPY RECORDS: 2
CHEMOTHERAPY RECORDS: 6000
CHEMOTHERAPY RECORDS: NORMAL
CHEMOTHERAPY RX CANCER: NORMAL
DATE 1ST CHEMO CANCER: NORMAL
RAD ONC ARIA COURSE LAST TREATMENT DATE: NORMAL
RAD ONC ARIA COURSE TREATMENT ELAPSED DAYS: NORMAL
RAD ONC ARIA REFERENCE POINT DOSAGE GIVEN TO DATE: 58
RAD ONC ARIA REFERENCE POINT DOSAGE GIVEN TO DATE: 59.72
RAD ONC ARIA REFERENCE POINT ID: NORMAL
RAD ONC ARIA REFERENCE POINT ID: NORMAL
RAD ONC ARIA REFERENCE POINT SESSION DOSAGE GIVEN: 2
RAD ONC ARIA REFERENCE POINT SESSION DOSAGE GIVEN: 2.06

## 2023-07-27 PROCEDURE — 77014 PR CT GUIDANCE PLACEMENT RAD THERAPY FIELDS: CPT | Mod: 26 | Performed by: RADIOLOGY

## 2023-07-27 PROCEDURE — 77386 HCHG IMRT DELIVERY COMPLEX: CPT | Performed by: RADIOLOGY

## 2023-07-28 ENCOUNTER — HOSPITAL ENCOUNTER (OUTPATIENT)
Dept: RADIATION ONCOLOGY | Facility: MEDICAL CENTER | Age: 83
End: 2023-07-28
Payer: MEDICARE

## 2023-07-28 LAB
CHEMOTHERAPY INFUSION START DATE: NORMAL
CHEMOTHERAPY INFUSION START DATE: NORMAL
CHEMOTHERAPY INFUSION STOP DATE: NORMAL
CHEMOTHERAPY RECORDS: 2
CHEMOTHERAPY RECORDS: 2
CHEMOTHERAPY RECORDS: 6000
CHEMOTHERAPY RECORDS: 6000
CHEMOTHERAPY RECORDS: NORMAL
CHEMOTHERAPY RX CANCER: NORMAL
CHEMOTHERAPY RX CANCER: NORMAL
DATE 1ST CHEMO CANCER: NORMAL
DATE 1ST CHEMO CANCER: NORMAL
RAD ONC ARIA COURSE LAST TREATMENT DATE: NORMAL
RAD ONC ARIA COURSE LAST TREATMENT DATE: NORMAL
RAD ONC ARIA COURSE TREATMENT ELAPSED DAYS: NORMAL
RAD ONC ARIA COURSE TREATMENT ELAPSED DAYS: NORMAL
RAD ONC ARIA REFERENCE POINT DOSAGE GIVEN TO DATE: 60
RAD ONC ARIA REFERENCE POINT DOSAGE GIVEN TO DATE: 60
RAD ONC ARIA REFERENCE POINT DOSAGE GIVEN TO DATE: 61.78
RAD ONC ARIA REFERENCE POINT DOSAGE GIVEN TO DATE: 61.78
RAD ONC ARIA REFERENCE POINT ID: NORMAL
RAD ONC ARIA REFERENCE POINT SESSION DOSAGE GIVEN: 2
RAD ONC ARIA REFERENCE POINT SESSION DOSAGE GIVEN: 2.06

## 2023-07-28 PROCEDURE — 77386 HCHG IMRT DELIVERY COMPLEX: CPT | Performed by: RADIOLOGY

## 2023-07-28 PROCEDURE — 77427 RADIATION TX MANAGEMENT X5: CPT | Performed by: RADIOLOGY

## 2023-07-28 PROCEDURE — 77014 PR CT GUIDANCE PLACEMENT RAD THERAPY FIELDS: CPT | Mod: 26 | Performed by: RADIOLOGY

## 2023-08-01 NOTE — RADIATION COMPLETION NOTES
END OF TREATMENT SUMMARY    Patient name:  Wally Joel    Primary Physician:  Pcp Pt States None MRN: [id number]  CSN: [id number]   Referring physician:  Jose R Wright MD : 1940, 83 y.o.       TREATMENT SUMMARY:        Course First Treatment Date 2023    Course Last Treatment Date 2023   Course Elapsed Days 39 @ [id number]   Course Intent Curative     Radiation Therapy Episodes       Active Episodes       Radiation Therapy: IMRT (2023)                   Radiation Treatments         Plan Last Treated On Elapsed Days Fractions Treated Prescribed Fraction Dose (cGy) Prescribed Total Dose (cGy)    L_Ear_Neck 2023 39 @ [id number] 30 of 30 200 6,000                  Reference Point Last Treated On Elapsed Days Most Recent Session Dose (cGy) Total Dose (cGy)    L_Ear_Neck 2023 39 @ [id number] -- 6,000    L_Ear_Neck CP 2023 39 @ [id number] -- 6,178                                     STAGE:   SCC (squamous cell carcinoma), ear, left  Staging form: Cutaneous Carcinoma of the Head and Neck, AJCC 8th Edition.  - Pathologic stage from 2023: Stage III (pT3, pN1, cM0) - Signed by Lupe PATEL M.D. 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     CONCURRENT SYSTEMIC TREATMENT:   None     RT COURSE DISCONTINUED EARLY:   No     PATIENT EXPERIENCE:       2023     9:45 AM 2023     9:37 AM 2023     9:35 AM 2023     9:43 AM 2023     9:34 AM 2023     9:44 AM   Toxicity Assessment   Toxicity Assessment Skin Skin Skin Skin Skin Skin   Fatigue (lethargy, malaise, asthenia) None Increased  fatigue over baseline, but not altering normal activities Increased fatigue over baseline, but not altering normal activities None None Increased fatigue over baseline, but not altering normal activities   Radiation Dermatitis Confluent moist desquamation 1.5 cm diameter or more and not confined to skin folds, with/without pitting edema Moderate to brisk erythema or a patchy moist desquamation, mostly confined to skin folds and creases, with/without moderate edema None None None None   Photosensitivity None None None None None None   Dry Skin Normal Normal Normal Normal Normal Normal   Alopecia Pronounced hair loss Normal Normal Normal Normal Normal   Erythema Multiforme (e.g., Or-Erik syndrome, toxic epidermal necrolysis) Absent Absent Absent Absent Absent Absent   Nail Changes Normal Normal Normal Normal Normal Normal   Wound (Non-Infectious) None None None None None None   Wound (Infectious) None None None None None    RT - Pain due to RT Mild pain not interfering with function None None None None None   Tumor Pain (onset or exacerbation of tumor pain due to treatment) None None None None None None   Skin - Late RT Patchy atrophy, moderate telangiectasia, total hair loss Slight atrophy, pigmentation change, some hair loss  No change from baseline No change from baseline No change from baseline        FOLLOW-UP PLAN:   6 Weeks     COMMENT:          ANATOMIC TARGET SUMMARY    ANATOMIC TARGET MODALITY TECHNIQUE   Left ear and neck   External beam, photons IMRT            COMMENT:         DIAGRAMS:      DOSE VOLUME HISTOGRAMS:          Lupe PATEL M.D.  Electronically signed by: Lupe PATEL M.D., 8/29/2023 2:22 PM  [phone]

## 2023-08-09 ENCOUNTER — HOSPITAL ENCOUNTER (OUTPATIENT)
Dept: RADIATION ONCOLOGY | Facility: MEDICAL CENTER | Age: 83
End: 2023-08-31
Attending: RADIOLOGY
Payer: MEDICARE

## 2023-08-09 VITALS
TEMPERATURE: 97.3 F | WEIGHT: 152.12 LBS | SYSTOLIC BLOOD PRESSURE: 136 MMHG | DIASTOLIC BLOOD PRESSURE: 72 MMHG | BODY MASS INDEX: 26.95 KG/M2 | OXYGEN SATURATION: 94 % | HEART RATE: 71 BPM

## 2023-08-09 ASSESSMENT — PAIN SCALES - GENERAL: PAINLEVEL: NO PAIN

## 2023-08-09 ASSESSMENT — FIBROSIS 4 INDEX: FIB4 SCORE: 1.81

## 2023-08-09 NOTE — PROGRESS NOTES
Patient was seen today in clinic with Dr. Tay for follow up.  Vitals signs and weight were obtained and pain assessment was completed.  Allergies and medications were reviewed with the patient.       Vitals/Pain:  Vitals:    08/09/23 1300   BP: 136/72   BP Location: Right arm   Patient Position: Sitting   BP Cuff Size: Adult   Pulse: 71   Temp: 36.3 °C (97.3 °F)   TempSrc: Temporal   SpO2: 94%   Weight: 69 kg (152 lb 1.9 oz)   Pain Score: No pain        Allergies:   Ciprofloxacin    Current Medications:  Current Outpatient Medications   Medication Sig Dispense Refill    silver sulfADIAZINE (SILVADENE) 1 % Cream Apply 1/8 inch layer to affected area  g 2    Zanubrutinib (BRUKINSA) 80 MG Cap 2 tab bid 120 Capsule 1    Zanubrutinib (BRUKINSA) 80 MG Cap Take 2 capsules by mouth twice daily 120 Capsule 1    Degarelix Acetate (FIRMAGON, 240 MG DOSE, SC) Inject  under the skin every 6 months. Pt unkn dose      BRUKINSA 80 MG Cap Take 80 mg by mouth 2 times a day.      Leuprolide Acetate, 6 Month, (LUPRON DEPOT, 6-MONTH, IM) Inject 45 mg into the shoulder, thigh, or buttocks.       No current facility-administered medications for this encounter.           Emili Dale, Med Ass't

## 2023-08-09 NOTE — PROGRESS NOTES
RADIATION ONCOLOGY FOLLOW-UP    Patient name:  Wally Joel    Primary Physician:  ROBBY Gabriel MRN: [id number]  [facility]: [id number]   Referring physician:  Jose R Wright M.D.  : 1940, 83 y.o.     DATE OF SERVICE: 2023    IDENTIFICATION:   A 83 y.o. male with    SCC (squamous cell carcinoma), ear, left  Staging form: Cutaneous Carcinoma of the Head and Neck, AJCC 8th Edition.  - Pathologic stage from 2023: Stage III (pT3, pN1, cM0) - Signed by Lupe PATEL M.D. on 2023  Histopathologic type: Squamous cell carcinoma, NOS  Histologic grade (G): G3  Histologic grading system: 4 grade system  Residual tumor (R): R0 - None  Laterality: Left  Lymph-vascular invasion (LVI): LVI not present (absent)/not identified  Extraosseous extension: Absent      RADIATION SUMMARY:  Radiation Oncology          2023   Aria Course Treatment Dates   Course First Treatment Date 2023    Course Last Treatment Date 2023    Aria Treatment Summary   L_Ear_Neck  Plan from Course C1_L ear&neck   Fraction 29 of 30 30 of 30    30 of 30   Elapsed Course Days 38 @  39 @     39 @    Prescribed Fraction Dose 200 cGy 200 cGy    200 cGy   Prescribed Total Dose 6,000 cGy 6,000 cGy    6,000 cGy   L_Ear_Neck  Reference Point from Course C1_L ear&neck   Elapsed Course Days 38 @ [id number] 39 @ [id number]    39 @    Session Dose 200 cGy 200 cGy    --   Total Dose 5,800 cGy 6,000 cGy    6,000 cGy   L_Ear_Neck CP  Reference Point from Course C1_L ear&neck   Elapsed Course Days 38 @ [id number] 39 @ [id number]    39 @    Session Dose 206 cGy 206 cGy    --   Total Dose 5,972 cGy 6,178 cGy    6,178 cGy      Details          More values are hidden. Newest values shown. Go to activity for more data.                HISTORY OF PRESENT ILLNESS:  Subjective    83-year-old retired [employer] .   He lives in [place].  He presented in April with a rapidly enlarging mass involving the skin of the pinna of the left ear.  This is in the setting of CLL.  He had a biopsy that demonstrated squamous cell carcinoma.  He was initially seen by plastic surgery who noted a 6 cm exophytic mass occupying approximately 30% of the pinna extending towards the junction of the pinna side of head.  He was subsequently referred to ENT and Dr. Lino.  Underwent on 4/24/2023 excision of the left auricular mass as well as incisional biopsy of left cervical lymph node.  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  Resected postauricular mass demonstrated metastatic squamous cell carcinoma. involving a lymph node which also was involved with patient's known CLL.  Lymph node specimen was fragmented and therefore extranodal extension was indeterminate.     He is now 6 weeks postop.  He has recurrent ulcerative bleeding mass involving the left postauricular region.  He denies pain.  He does report bruising and bleeding.  He  currently has it bandaged.     He also 1 week ago started Zanubrutinib for CLL.          INTERVAL HISTORY:  8/9/2023.  Scheduled follow-up visit.  Initial visit post completion of therapy.  He offers no complaints.  He is started systemic therapy for his CLL.  PROBLEM LIST:  Patient Active Problem List   Diagnosis    Chronic lymphocytic leukemia (CLL), B-cell (HCC)    Hypertrophy of prostate with urinary obstruction and other lower urinary tract symptoms (LUTS)    Incisional hernia    Right inguinal hernia    UTI (urinary tract infection)    Enlarged prostate    SCC (squamous cell carcinoma), ear, left       CURRENT MEDICATIONS:  Current Outpatient Medications   Medication Sig Dispense Refill    silver sulfADIAZINE (SILVADENE) 1 % Cream Apply 1/8 inch layer to affected area  g 2     Zanubrutinib (BRUKINSA) 80 MG Cap 2 tab bid 120 Capsule 1    Zanubrutinib (BRUKINSA) 80 MG Cap Take 2 capsules by mouth twice daily 120 Capsule 1    Degarelix Acetate (FIRMAGON, 240 MG DOSE, SC) Inject  under the skin every 6 months. Pt unkn dose      BRUKINSA 80 MG Cap Take 80 mg by mouth 2 times a day.      Leuprolide Acetate, 6 Month, (LUPRON DEPOT, 6-MONTH, IM) Inject 45 mg into the shoulder, thigh, or buttocks.       No current facility-administered medications for this encounter.       ALLERGIES:  Ciprofloxacin    REVIEW OF SYSTEMS:    A complete review of system taken. Pertinent items in HPI.  All others negative.    PHYSICAL EXAM:  PERFORMANCE STATUS:      6/6/2023     3:12 PM   ECOG Performance Review   ECOG Performance Status Fully active, able to carry on all pre-disease performance without restriction         6/6/2023     3:12 PM   Karnofsky Score   Karnofsky Score 90     There were no vitals taken for this visit.  Physical Exam  Vitals and nursing note reviewed.   Constitutional:       General: He is not in acute distress.     Appearance: He is well-developed.   HENT:      Head: Normocephalic.   Skin:     General: Skin is warm and dry.      Findings: No erythema.      Comments: Lesion left postauricular region resolved.  Skin lesion left cheek improving.  Shows radiation effect.   Neurological:      Mental Status: He is alert and oriented to person, place, and time.   Psychiatric:         Behavior: Behavior normal.         Thought Content: Thought content normal.         Judgment: Judgment normal.         LABORATORY DATA:   Lab Results   Component Value Date/Time    WBC 62.3 (HH) 04/24/2023 11:17 AM    RBC 4.19 (L) 04/24/2023 11:17 AM    HEMOGLOBIN 10.5 (L) 04/24/2023 11:17 AM    HEMATOCRIT 34.6 (L) 04/24/2023 11:17 AM    MCV 82.6 04/24/2023 11:17 AM    MCH 25.1 (L) 04/24/2023 11:17 AM    MCHC 30.3 (L) 04/24/2023 11:17 AM    RDW 50.0 04/24/2023 11:17 AM    PLATELETCT 165 04/24/2023 11:17 AM    MPV 10.9  04/24/2023 11:17 AM    NEUTSPOLYS 9.40 (L) 04/24/2023 11:17 AM    LYMPHOCYTES 90.60 (H) 04/24/2023 11:17 AM    MONOCYTES 0.00 04/24/2023 11:17 AM    EOSINOPHILS 0.00 04/24/2023 11:17 AM    BASOPHILS 0.00 04/24/2023 11:17 AM    HYPOCHROMIA 1+ 04/24/2023 11:17 AM    ANISOCYTOSIS 2+ (A) 04/24/2023 11:17 AM      Lab Results   Component Value Date/Time    SODIUM 137 05/05/2023 01:50 PM    POTASSIUM 4.5 05/05/2023 01:50 PM    CHLORIDE 104 05/05/2023 01:50 PM    CO2 22 05/05/2023 01:50 PM    GLUCOSE 129 (H) 05/05/2023 01:50 PM    BUN 25 (H) 05/05/2023 01:50 PM    CREATININE 1.44 (H) 05/05/2023 01:50 PM         RADIOLOGY DATA:  No results found.    IMPRESSION:    A 83 y.o. with   SCC (squamous cell carcinoma), ear, left  Staging form: Cutaneous Carcinoma of the Head and Neck, AJCC 8th Edition.  - Pathologic stage from 6/6/2023: Stage III (pT3, pN1, cM0) - Signed by Lupe PATEL M.D. on 6/6/2023  Histopathologic type: Squamous cell carcinoma, NOS  Histologic grade (G): G3  Histologic grading system: 4 grade system  Residual tumor (R): R0 - None  Laterality: Left  Lymph-vascular invasion (LVI): LVI not present (absent)/not identified  Extraosseous extension: Absent      CANCER STATUS:  No Evidence of Disease    RECOMMENDATIONS:   Reassured him.  He is being followed by Dr. Wright.  Will defer future follow-up visits to him and see him back on an as-needed basis.      Thank you for the opportunity to participate in his care.  If any questions or comments, please do not hesitate in calling.    No orders of the defined types were placed in this encounter.

## 2023-11-27 ENCOUNTER — HOSPITAL ENCOUNTER (OUTPATIENT)
Dept: RADIOLOGY | Facility: MEDICAL CENTER | Age: 83
End: 2023-11-27
Payer: MEDICARE

## 2024-05-03 ENCOUNTER — HOSPITAL ENCOUNTER (INPATIENT)
Facility: MEDICAL CENTER | Age: 84
LOS: 3 days | DRG: 156 | End: 2024-05-06
Attending: STUDENT IN AN ORGANIZED HEALTH CARE EDUCATION/TRAINING PROGRAM | Admitting: HOSPITALIST
Payer: MEDICARE

## 2024-05-03 ENCOUNTER — HOSPITAL ENCOUNTER (OUTPATIENT)
Dept: RADIOLOGY | Facility: MEDICAL CENTER | Age: 84
End: 2024-05-03

## 2024-05-03 DIAGNOSIS — H60.02 ABSCESS OF LEFT EXTERNAL EAR: ICD-10-CM

## 2024-05-03 DIAGNOSIS — K43.0 INCISIONAL HERNIA WITH OBSTRUCTION BUT NO GANGRENE: ICD-10-CM

## 2024-05-03 DIAGNOSIS — C44.229 SCC (SQUAMOUS CELL CARCINOMA), EAR, LEFT: ICD-10-CM

## 2024-05-03 DIAGNOSIS — L02.91 ABSCESS: ICD-10-CM

## 2024-05-03 DIAGNOSIS — H70.92 MASTOIDITIS OF LEFT SIDE: ICD-10-CM

## 2024-05-03 PROBLEM — H70.90 MASTOIDITIS: Status: ACTIVE | Noted: 2024-05-03

## 2024-05-03 LAB
ALBUMIN SERPL BCP-MCNC: 3.3 G/DL (ref 3.2–4.9)
ALBUMIN/GLOB SERPL: 1.6 G/DL
ALP SERPL-CCNC: 98 U/L (ref 30–99)
ALT SERPL-CCNC: 15 U/L (ref 2–50)
ANION GAP SERPL CALC-SCNC: 12 MMOL/L (ref 7–16)
AST SERPL-CCNC: 9 U/L (ref 12–45)
BILIRUB SERPL-MCNC: <0.2 MG/DL (ref 0.1–1.5)
BUN SERPL-MCNC: 25 MG/DL (ref 8–22)
CALCIUM ALBUM COR SERPL-MCNC: 9.8 MG/DL (ref 8.5–10.5)
CALCIUM SERPL-MCNC: 9.2 MG/DL (ref 8.5–10.5)
CHLORIDE SERPL-SCNC: 104 MMOL/L (ref 96–112)
CO2 SERPL-SCNC: 21 MMOL/L (ref 20–33)
CREAT SERPL-MCNC: 1.21 MG/DL (ref 0.5–1.4)
CRP SERPL HS-MCNC: 1.66 MG/DL (ref 0–0.75)
GFR SERPLBLD CREATININE-BSD FMLA CKD-EPI: 59 ML/MIN/1.73 M 2
GLOBULIN SER CALC-MCNC: 2.1 G/DL (ref 1.9–3.5)
GLUCOSE SERPL-MCNC: 140 MG/DL (ref 65–99)
LACTATE SERPL-SCNC: 1.2 MMOL/L (ref 0.5–2)
MAGNESIUM SERPL-MCNC: 1.9 MG/DL (ref 1.5–2.5)
PHOSPHATE SERPL-MCNC: 3.7 MG/DL (ref 2.5–4.5)
POTASSIUM SERPL-SCNC: 4.5 MMOL/L (ref 3.6–5.5)
PROCALCITONIN SERPL-MCNC: 0.09 NG/ML
PROT SERPL-MCNC: 5.4 G/DL (ref 6–8.2)
SODIUM SERPL-SCNC: 137 MMOL/L (ref 135–145)

## 2024-05-03 PROCEDURE — 99223 1ST HOSP IP/OBS HIGH 75: CPT | Mod: AI | Performed by: HOSPITALIST

## 2024-05-03 RX ORDER — NYSTATIN 100000 [USP'U]/G
POWDER TOPICAL 2 TIMES DAILY
Status: DISCONTINUED | OUTPATIENT
Start: 2024-05-03 | End: 2024-05-06 | Stop reason: HOSPADM

## 2024-05-03 RX ORDER — ACETAMINOPHEN 325 MG/1
650 TABLET ORAL EVERY 6 HOURS PRN
Status: DISCONTINUED | OUTPATIENT
Start: 2024-05-03 | End: 2024-05-06 | Stop reason: HOSPADM

## 2024-05-03 RX ORDER — OXYCODONE HYDROCHLORIDE 5 MG/1
2.5 TABLET ORAL
Status: DISCONTINUED | OUTPATIENT
Start: 2024-05-03 | End: 2024-05-06 | Stop reason: HOSPADM

## 2024-05-03 RX ORDER — OXYCODONE HYDROCHLORIDE 5 MG/1
5 TABLET ORAL
Status: DISCONTINUED | OUTPATIENT
Start: 2024-05-03 | End: 2024-05-06 | Stop reason: HOSPADM

## 2024-05-03 RX ORDER — MORPHINE SULFATE 4 MG/ML
2 INJECTION INTRAVENOUS
Status: DISCONTINUED | OUTPATIENT
Start: 2024-05-03 | End: 2024-05-06 | Stop reason: HOSPADM

## 2024-05-03 RX ADMIN — OXYCODONE 5 MG: 5 TABLET ORAL at 21:24

## 2024-05-03 ASSESSMENT — ENCOUNTER SYMPTOMS
MYALGIAS: 0
ABDOMINAL PAIN: 0
ORTHOPNEA: 0
BACK PAIN: 0
PND: 0
SHORTNESS OF BREATH: 0
SPUTUM PRODUCTION: 0
DEPRESSION: 0
CONSTIPATION: 0
HEMOPTYSIS: 0
SORE THROAT: 0
FEVER: 0
BLURRED VISION: 0
PALPITATIONS: 0
PHOTOPHOBIA: 0
DIARRHEA: 0
DIZZINESS: 0
BRUISES/BLEEDS EASILY: 0
WHEEZING: 0
POLYDIPSIA: 0
TINGLING: 0
VOMITING: 0
FALLS: 0
NAUSEA: 0
STRIDOR: 0
SINUS PAIN: 0
HEARTBURN: 0
TREMORS: 0
FLANK PAIN: 0
COUGH: 0
DOUBLE VISION: 0
BLOOD IN STOOL: 0
HEADACHES: 0
EYE PAIN: 0
CLAUDICATION: 0
CHILLS: 1
HALLUCINATIONS: 0
DIAPHORESIS: 0
NECK PAIN: 0
WEAKNESS: 0

## 2024-05-03 ASSESSMENT — COGNITIVE AND FUNCTIONAL STATUS - GENERAL
DAILY ACTIVITIY SCORE: 24
SUGGESTED CMS G CODE MODIFIER DAILY ACTIVITY: CH
MOBILITY SCORE: 24
SUGGESTED CMS G CODE MODIFIER MOBILITY: CH

## 2024-05-03 ASSESSMENT — PATIENT HEALTH QUESTIONNAIRE - PHQ9
SUM OF ALL RESPONSES TO PHQ9 QUESTIONS 1 AND 2: 0
1. LITTLE INTEREST OR PLEASURE IN DOING THINGS: NOT AT ALL
2. FEELING DOWN, DEPRESSED, IRRITABLE, OR HOPELESS: NOT AT ALL

## 2024-05-03 ASSESSMENT — PAIN DESCRIPTION - PAIN TYPE
TYPE: ACUTE PAIN
TYPE: ACUTE PAIN

## 2024-05-03 ASSESSMENT — FIBROSIS 4 INDEX: FIB4 SCORE: 1.83

## 2024-05-03 ASSESSMENT — LIFESTYLE VARIABLES: SUBSTANCE_ABUSE: 0

## 2024-05-04 LAB
BASOPHILS # BLD AUTO: 0 % (ref 0–1.8)
BASOPHILS # BLD: 0 K/UL (ref 0–0.12)
COMMENT NL1176: NORMAL
EOSINOPHIL # BLD AUTO: 0 K/UL (ref 0–0.51)
EOSINOPHIL NFR BLD: 0 % (ref 0–6.9)
ERYTHROCYTE [DISTWIDTH] IN BLOOD BY AUTOMATED COUNT: 44.2 FL (ref 35.9–50)
ERYTHROCYTE [SEDIMENTATION RATE] IN BLOOD BY WESTERGREN METHOD: 18 MM/HOUR (ref 0–20)
GRAM STN SPEC: NORMAL
GRAM STN SPEC: NORMAL
HCT VFR BLD AUTO: 30.5 % (ref 42–52)
HGB BLD-MCNC: 10.2 G/DL (ref 14–18)
LACTATE SERPL-SCNC: 0.9 MMOL/L (ref 0.5–2)
LACTATE SERPL-SCNC: 0.9 MMOL/L (ref 0.5–2)
LACTATE SERPL-SCNC: 1.5 MMOL/L (ref 0.5–2)
LYMPHOCYTES # BLD AUTO: 6.02 K/UL (ref 1–4.8)
LYMPHOCYTES NFR BLD: 64.7 % (ref 22–41)
MANUAL DIFF BLD: NORMAL
MCH RBC QN AUTO: 29.8 PG (ref 27–33)
MCHC RBC AUTO-ENTMCNC: 33.4 G/DL (ref 32.3–36.5)
MCV RBC AUTO: 89.2 FL (ref 81.4–97.8)
MICROCYTES BLD QL SMEAR: ABNORMAL
MONOCYTES # BLD AUTO: 0.32 K/UL (ref 0–0.85)
MONOCYTES NFR BLD AUTO: 3.4 % (ref 0–13.4)
MORPHOLOGY BLD-IMP: NORMAL
NEUTROPHILS # BLD AUTO: 2.97 K/UL (ref 1.82–7.42)
NEUTROPHILS NFR BLD: 31.9 % (ref 44–72)
NRBC # BLD AUTO: 0 K/UL
NRBC BLD-RTO: 0 /100 WBC (ref 0–0.2)
PLATELET # BLD AUTO: 116 K/UL (ref 164–446)
PLATELET BLD QL SMEAR: NORMAL
PMV BLD AUTO: 11.5 FL (ref 9–12.9)
RBC # BLD AUTO: 3.42 M/UL (ref 4.7–6.1)
RBC BLD AUTO: PRESENT
SIGNIFICANT IND 70042: NORMAL
SIGNIFICANT IND 70042: NORMAL
SITE SITE: NORMAL
SITE SITE: NORMAL
SMUDGE CELLS BLD QL SMEAR: NORMAL
SOURCE SOURCE: NORMAL
SOURCE SOURCE: NORMAL
WBC # BLD AUTO: 9.3 K/UL (ref 4.8–10.8)

## 2024-05-04 PROCEDURE — 99233 SBSQ HOSP IP/OBS HIGH 50: CPT | Performed by: HOSPITALIST

## 2024-05-04 RX ORDER — MINERAL OIL/HYDROPHIL PETROLAT
1 OINTMENT (GRAM) TOPICAL
Status: ON HOLD | COMMUNITY
End: 2024-05-06

## 2024-05-04 RX ORDER — PREGABALIN 50 MG/1
50 CAPSULE ORAL 2 TIMES DAILY
COMMUNITY
Start: 2024-04-06

## 2024-05-04 RX ADMIN — PIPERACILLIN AND TAZOBACTAM 4.5 G: 4; .5 INJECTION, POWDER, FOR SOLUTION INTRAVENOUS at 13:35

## 2024-05-04 RX ADMIN — ACETAMINOPHEN 650 MG: 325 TABLET, FILM COATED ORAL at 09:40

## 2024-05-04 RX ADMIN — NYSTATIN: 100000 POWDER TOPICAL at 05:46

## 2024-05-04 RX ADMIN — PIPERACILLIN AND TAZOBACTAM 4.5 G: 4; .5 INJECTION, POWDER, FOR SOLUTION INTRAVENOUS at 01:07

## 2024-05-04 RX ADMIN — NYSTATIN: 100000 POWDER TOPICAL at 17:20

## 2024-05-04 RX ADMIN — ACETAMINOPHEN 650 MG: 325 TABLET, FILM COATED ORAL at 17:19

## 2024-05-04 RX ADMIN — PIPERACILLIN AND TAZOBACTAM 4.5 G: 4; .5 INJECTION, POWDER, FOR SOLUTION INTRAVENOUS at 21:09

## 2024-05-04 ASSESSMENT — LIFESTYLE VARIABLES
ON A TYPICAL DAY WHEN YOU DRINK ALCOHOL HOW MANY DRINKS DO YOU HAVE: 0
AVERAGE NUMBER OF DAYS PER WEEK YOU HAVE A DRINK CONTAINING ALCOHOL: 0
EVER HAD A DRINK FIRST THING IN THE MORNING TO STEADY YOUR NERVES TO GET RID OF A HANGOVER: NO
HOW MANY TIMES IN THE PAST YEAR HAVE YOU HAD 5 OR MORE DRINKS IN A DAY: 0
CONSUMPTION TOTAL: NEGATIVE
EVER FELT BAD OR GUILTY ABOUT YOUR DRINKING: NO
TOTAL SCORE: 0
HAVE YOU EVER FELT YOU SHOULD CUT DOWN ON YOUR DRINKING: NO
ALCOHOL_USE: NO
TOTAL SCORE: 0
DOES PATIENT WANT TO STOP DRINKING: NO
HAVE PEOPLE ANNOYED YOU BY CRITICIZING YOUR DRINKING: NO
TOTAL SCORE: 0

## 2024-05-04 ASSESSMENT — PAIN DESCRIPTION - PAIN TYPE
TYPE: ACUTE PAIN

## 2024-05-04 ASSESSMENT — ENCOUNTER SYMPTOMS
NAUSEA: 0
PND: 0
HEADACHES: 0
BACK PAIN: 0
HEMOPTYSIS: 0
CLAUDICATION: 0
WHEEZING: 0
BRUISES/BLEEDS EASILY: 0
DEPRESSION: 0
MYALGIAS: 0
FEVER: 0
HEARTBURN: 0
CHILLS: 0
COUGH: 0
VOMITING: 0
DOUBLE VISION: 0
DIZZINESS: 0
PALPITATIONS: 0
BLURRED VISION: 0

## 2024-05-04 ASSESSMENT — FIBROSIS 4 INDEX
FIB4 SCORE: 1.66
FIB4 SCORE: 1.66

## 2024-05-04 NOTE — ASSESSMENT & PLAN NOTE
Patient presents with a necrotic wound and had a maggot infestation  The wound was bleeding upon arrival to the ER at [facility]  IV Zosyn  Discussed with DR Beal ENT, I this time he is recommending to continue supportive treatment, wound care eval, pending on wound care evel might need plastic surgery consult  F/u cx results.   I have discussed with plastic surgery Dr. Garrett who at this time is recommending follow-up with patient's main ENT and if they feel that they need plastic surgery they can discuss plan of care, will try to discuss with Dr. Hill  tomorrow.   Continue IV antibiotics follow-up cultures.  Preliminary wound culture report showing Proteus and Staphylococcus

## 2024-05-04 NOTE — PROGRESS NOTES
[facility] Medicine Daily Progress Note    Date of Service  5/4/2024    Chief Complaint  Wally Joel is a 84 y.o. male admitted 5/3/2024 with left ear wound    Hospital Course  Wally Joel is a 84 y.o. male who presented 5/3/2024 with Past medical history is skin cancer on the left ear, history of surgery and chemoradiation, prostate cancer, had umbilical hernia repair, ventral incisional hernia repair, history of urinary retention who presents to the hospital for a wound on his left ear.  Patient states that the wound has been present for more than a month.  He has tried and take care of it himself but is getting progressively worse.  On Monday he started hearing a buzzing sounds on his left ear and found to have maggots.  He presented today at [facility] and on his way to the hospital his left ear started bleeding.  He denies any fever, chills, nausea, vomiting, headaches, photophobia.  He has decreased hearing on his left ear.  He last received chemo and radiation in July 2023.  Patient also complains of a large ventricle hernia on the right lower quadrant with a wound that is draining as well.  It is questionable whether he has metastatic disease to the area.  The patient was transferred to [facility] for ENT consult.     At [facility] he was started on IV Zosyn.     WBC 14, hemoglobin 11, platelets 150, creatinine 1.44, lactic acid 1.2, bili 1.3     Wound cultures found having gram-positive cocci and gram-negative rods.     CT scan found inflammatory changes of the left ear and postauricular region.  1.4 x 1.2 cm collection of fluid seen along the skin surface extending to the subcutaneous fat.  This was suspicious for small abscess.  Inflammatory changes about the left mastoid there is suggestion of cortical lucencies and the lateral cortex of the left mastoid.  This does not demonstrate been represent postsurgical change or possibly early osteomyelitis of the  left mastoid.  Small amount of fluid in the dependent aspect of the left mastoid.  Multiple small mildly prominent cervical lymph node are present but this lymphadenopathy is much improved compared to 5/11/2023       Interval Problem Update  5/4 patient is sitting in bed, he is alert oriented follows commands, patient has left-sided ulcer in the back area of his ear, no fever no chills, I discussed with ENT doctor who do not, at this time he is recommending wound care evaluation, pending on wound care might need wound VAC or plastic surgery evaluation, will follow-up blood cultures, continue on Zosyn, I have discussed with bedside nurse charge nurse , will discuss with Dr. Millard radiation oncology.    I have discussed this patient's plan of care and discharge plan at IDT rounds today with Case Management, Nursing, Nursing leadership, and other members of the IDT team.    Consultants/Specialty      Code Status  Full Code    Disposition  The patient is not medically cleared for discharge to home or a post-acute facility.      I have placed the appropriate orders for post-discharge needs.    Review of Systems  Review of Systems   Constitutional:  Negative for chills and fever.   HENT:          Left posterior ear wound, discharge.    Eyes:  Negative for blurred vision and double vision.   Respiratory:  Negative for cough, hemoptysis and wheezing.    Cardiovascular:  Negative for chest pain, palpitations, claudication, leg swelling and PND.   Gastrointestinal:  Negative for heartburn, nausea and vomiting.   Genitourinary:  Negative for hematuria and urgency.   Musculoskeletal:  Negative for back pain and myalgias.   Skin:  Negative for rash.   Neurological:  Negative for dizziness and headaches.   Endo/Heme/Allergies:  Does not bruise/bleed easily.   Psychiatric/Behavioral:  Negative for depression.         Physical Exam  Temp:  [35.9 °C (96.6 °F)-36.5 °C (97.7 °F)] 36.5 °C (97.7 °F)  Pulse:  [74-86]  74  Resp:  [12-15] 15  BP: ()/(61-69) 124/69  SpO2:  [92 %-95 %] 94 %    Physical Exam  Vitals and nursing note reviewed.   Constitutional:       Appearance: He is ill-appearing.   HENT:      Ears:      Comments: Left posterior ear area with round wound/ulcer, discharge.   Eyes:      General:         Right eye: No discharge.         Left eye: No discharge.      Conjunctiva/sclera: Conjunctivae normal.      Pupils: Pupils are equal, round, and reactive to light.   Cardiovascular:      Rate and Rhythm: Normal rate and regular rhythm.      Pulses: Normal pulses.   Abdominal:      General: Bowel sounds are normal. There is no distension.      Tenderness: There is no abdominal tenderness.   Musculoskeletal:         General: Normal range of motion.      Cervical back: Normal range of motion and neck supple.   Skin:     General: Skin is warm.      Capillary Refill: Capillary refill takes less than 2 seconds.      Coloration: Skin is not jaundiced.   Neurological:      General: No focal deficit present.      Mental Status: He is alert.   Psychiatric:         Mood and Affect: Mood normal.         Fluids  No intake or output data in the 24 hours ending 05/04/24 1344    Laboratory  Recent Labs     05/03/24  2150   WBC 9.3   RBC 3.42*   HEMOGLOBIN 10.2*   HEMATOCRIT 30.5*   MCV 89.2   MCH 29.8   MCHC 33.4   RDW 44.2   PLATELETCT 116*   MPV 11.5     Recent Labs     05/03/24  2150   SODIUM 137   POTASSIUM 4.5   CHLORIDE 104   CO2 21   GLUCOSE 140*   BUN 25*   CREATININE 1.21   CALCIUM 9.2                   Imaging  No orders to display        Assessment/Plan  * Abscess of left external ear  Assessment & Plan  Patient presents with a necrotic wound and had a maggot infestation  The wound was bleeding upon arrival to the ER at [facility]  IV Zosyn  Discussed with DR Beal ENT, I this time he is recommending to continue supportive treatment, wound care eval, pending on wound care evel might need plastic surgery  consult  F/u cx results.       Mastoiditis  Assessment & Plan  According to CT scan there may be some evidence of mastoiditis on CT scan  Start IV Zosyn  Iv abx for now.    SCC (squamous cell carcinoma), ear, left- (present on admission)  Assessment & Plan  Patient presents with a wound that is chronic on his left ear and now has an abscess according to CT scan  Start IV Zosyn  Wound culture  Wound consulted      Incisional hernia- (present on admission)  Assessment & Plan  There is a wound on the right lower quadrant on top of his incisional hernia  Wound culture  Wound consult  Consider surgery consult to have a biopsied         VTE prophylaxis: SCDs    I have performed a physical exam and reviewed and updated ROS and Plan today (5/4/2024). In review of yesterday's note (5/3/2024), there are no changes except as documented above.    Total time of 51 minutes spent prepping to see patient (e.g. reviewing  tests/imaging results, notes from consultants, bedside nurse, night shift ) obtaining and/or reviewing separately obtained history. Performing a medically appropriate examination and evaluation.  Counseling and educating the patient.  Ordering medications, tests, or procedures.  Referring and communicating with other health care professionals.  Documenting clinical information in EPIC.  Independently interpreting results and communicating results to patient.  Care coordination.

## 2024-05-04 NOTE — ASSESSMENT & PLAN NOTE
There is a wound on the right lower quadrant on top of his incisional hernia  Wound culture  Wound consult  Consider surgery consult to have a biopsied

## 2024-05-04 NOTE — ASSESSMENT & PLAN NOTE
According to CT scan there may be some evidence of mastoiditis on CT scan  Start IV Zosyn  Iv abx for now.

## 2024-05-04 NOTE — ASSESSMENT & PLAN NOTE
Patient presents with a wound that is chronic on his left ear and now has an abscess according to CT scan  Start IV Zosyn  Wound culture  Wound consulted

## 2024-05-04 NOTE — HOSPITAL COURSE
Wally Joel is a 84 y.o. male who presented 5/3/2024 with Past medical history is skin cancer on the left ear, history of surgery and chemoradiation, prostate cancer, had umbilical hernia repair, ventral incisional hernia repair, history of urinary retention who presents to the hospital for a wound on his left ear.  Patient states that the wound has been present for more than a month.  He has tried and take care of it himself but is getting progressively worse.  On Monday he started hearing a buzzing sounds on his left ear and found to have maggots.  He presented today at [facility] and on his way to the hospital his left ear started bleeding.  He denies any fever, chills, nausea, vomiting, headaches, photophobia.  He has decreased hearing on his left ear.  He last received chemo and radiation in July 2023.  Patient also complains of a large ventricle hernia on the right lower quadrant with a wound that is draining as well.  It is questionable whether he has metastatic disease to the area.  The patient was transferred to [facility] for ENT consult.     At [facility] he was started on IV Zosyn.     WBC 14, hemoglobin 11, platelets 150, creatinine 1.44, lactic acid 1.2, bili 1.3     Wound cultures found having gram-positive cocci and gram-negative rods.     CT scan found inflammatory changes of the left ear and postauricular region.  1.4 x 1.2 cm collection of fluid seen along the skin surface extending to the subcutaneous fat.  This was suspicious for small abscess.  Inflammatory changes about the left mastoid there is suggestion of cortical lucencies and the lateral cortex of the left mastoid.  This does not demonstrate been represent postsurgical change or possibly early osteomyelitis of the left mastoid.  Small amount of fluid in the dependent aspect of the left mastoid.  Multiple small mildly prominent cervical lymph node are present but this lymphadenopathy is much improved  compared to 5/11/2023

## 2024-05-04 NOTE — PROGRESS NOTES
Admitted thus pt from [facility], pt is AOX4, ambulatory,room air, ambulated from [facility] to hospital bed, steady and independent, pt oriented to room routine admit done, pt updated on plan of care and answered all his questions,     2200 hrs wound care done, pain medications given per MAR wound culture collected and sent.  Pt provided with straight cath materials, educated on proper use of hospital supply.

## 2024-05-04 NOTE — PROGRESS NOTES
4 Eyes Skin Assessment Completed by ximena RN and Jignesh RN.    Head WDL  Ears Redness left ear necrotic wound  Nose WDL  Mouth WDL  Neck Redness left neck redness  Breast/Chest WDL  Shoulder Blades WDL  Spine WDL  (R) Arm/Elbow/Hand WDL  (L) Arm/Elbow/Hand WDL  Abdomen Redness and Abrasion  Groin Redness moist   Scrotum/Coccyx/Buttocks Redness blanching  (R) Leg WDL  (L) Leg WDL  (R) Heel/Foot/Toe WDL  (L) Heel/Foot/Toe WDL          Devices In Places Blood Pressure Cuff      Interventions In Place Pillows    Possible Skin Injury Yes    Pictures Uploaded Into Epic Yes  Wound Consult Placed Yes  RN Wound Prevention Protocol Ordered Yes

## 2024-05-04 NOTE — PROGRESS NOTES
Received report and assumed care of patient at change of shift. Patient A&Ox4 , on RA , and reports 3/10 pain at this time.Medication given per MAR for pain. Patient assessment completed, bed in lowest position, and call light and personal belongings within reach. Patient expressed no further needs at this time.

## 2024-05-04 NOTE — PROGRESS NOTES
BRIEF ADMISSION REPORT:    This patient is being admitted to the Hospitalist Service.    Report received from NICK Espinosa:Harshad Guerra, [facility]  Reason for Admission:Abscess along the back of the ear   -- Our ENT physician reviewed the imaging, and did not believe there was a need for surgical intervention at this time. Outside hospitalist still requesting transfer for formal ENT consult     Pertinent history and findings: Patient is a 84-year-old male with past med history of skin cancer and prostate cancer.  Skin cancer localized behind left ear.  He is at surgery and chemoradiation.  He has a chronic wound now that is been getting larger.  It is about 5 x 2 cm and approximate 1 cm deep.  On presentation, it had been infected with maggots.  He underwent a washout.  CT scan was obtained showing a small abscess and inflammation.  There are general surgeon believes that it was outside of their scope for any intervention.  Outside hospitalist would like a formal ENT consult  Patient currently on Zosyn      Vitals Signs reviewed and patient is stable: stable   Consults Called: Consult ENT in the morning  Hospitalist Assigned to Admit this Patient:RTOC  CODE Status:full      For any questions or concerns about this patient, please page Hospitalist Service.

## 2024-05-04 NOTE — CARE PLAN
The patient is Stable - Low risk of patient condition declining or worsening    Shift Goals  Clinical Goals: wound care, pain reduced to comfort level 3/10  Patient Goals: pain control    Progress made toward(s) clinical / shift goals:  pt report pain relief to 3/10 after pain meds, wound care done, cound consult submitted, wound culture collected and sent    Patient is not progressing towards the following goals:

## 2024-05-04 NOTE — H&P
Hospital Medicine History & Physical Note    Date of Service  5/3/2024    Primary Care Physician  ROBBY Gabriel    Consultants      Specialist Names:     Code Status  Full Code    Chief Complaint  Left ear wound     History of Presenting Illness  Wally Joel is a 84 y.o. male who presented 5/3/2024 with Past medical history is skin cancer on the left ear, history of surgery and chemoradiation, prostate cancer, had umbilical hernia repair, ventral incisional hernia repair, history of urinary retention who presents to the hospital for a wound on his left ear.  Patient states that the wound has been present for more than a month.  He has tried and take care of it himself but is getting progressively worse.  On Monday he started hearing a buzzing sounds on his left ear and found to have maggots.  He presented today at [facility] and on his way to the hospital his left ear started bleeding.  He denies any fever, chills, nausea, vomiting, headaches, photophobia.  He has decreased hearing on his left ear.  He last received chemo and radiation in July 2023.  Patient also complains of a large ventricle hernia on the right lower quadrant with a wound that is draining as well.  It is questionable whether he has metastatic disease to the area.  The patient was transferred to [facility] for ENT consult.    At [facility] he was started on IV Zosyn.    WBC 14, hemoglobin 11, platelets 150, creatinine 1.44, lactic acid 1.2, bili 1.3    Wound cultures found having gram-positive cocci and gram-negative rods.    CT scan found inflammatory changes of the left ear and postauricular region.  1.4 x 1.2 cm collection of fluid seen along the skin surface extending to the subcutaneous fat.  This was suspicious for small abscess.  Inflammatory changes about the left mastoid there is suggestion of cortical lucencies and the lateral cortex of the left mastoid.  This does not demonstrate been  represent postsurgical change or possibly early osteomyelitis of the left mastoid.  Small amount of fluid in the dependent aspect of the left mastoid.  Multiple small mildly prominent cervical lymph node are present but this lymphadenopathy is much improved compared to 5/11/2023    I discussed the plan of care with patient.    Review of Systems  Review of Systems   Constitutional:  Positive for chills. Negative for diaphoresis, fever and malaise/fatigue.   HENT:  Positive for ear pain. Negative for congestion, ear discharge, hearing loss, nosebleeds, sinus pain, sore throat and tinnitus.    Eyes:  Negative for blurred vision, double vision, photophobia and pain.   Respiratory:  Negative for cough, hemoptysis, sputum production, shortness of breath, wheezing and stridor.    Cardiovascular:  Negative for chest pain, palpitations, orthopnea, claudication, leg swelling and PND.   Gastrointestinal:  Negative for abdominal pain, blood in stool, constipation, diarrhea, heartburn, melena, nausea and vomiting.   Genitourinary:  Negative for dysuria, flank pain, frequency, hematuria and urgency.   Musculoskeletal:  Negative for back pain, falls, joint pain, myalgias and neck pain.   Skin:  Negative for itching and rash.   Neurological:  Negative for dizziness, tingling, tremors, weakness and headaches.   Endo/Heme/Allergies:  Negative for environmental allergies and polydipsia. Does not bruise/bleed easily.   Psychiatric/Behavioral:  Negative for depression, hallucinations, substance abuse and suicidal ideas.        Past Medical History   has a past medical history of BPH (benign prostatic hyperplasia), Cancer (HCC), Cancer of skin of left ear, CLL (chronic lymphocytic leukemia) (HCC) (07/2014), Inguinal hernia, and Jaundice.    Surgical History   has a past surgical history that includes other abdominal surgery (2010); cystoscopy (10/29/2014); trans urethral resection prostate (10/29/2014); inguinal hernia laparoscopic  (3/1/2017); other orthopedic surgery (, ); cystoscopy (3/16/2018); pr exploratory of abdomen (N/A, 2020); ventral hernia repair (N/A, 2020); and excision, lesion, head and neck region (Left, 2023).     Family History  family history includes Liver Cancer in his father; Pancreatic Cancer in his sister.   Family history reviewed with patient. There is no family history that is pertinent to the chief complaint.     Social History   reports that he has never smoked. He has never used smokeless tobacco. He reports current alcohol use of about 0.6 oz of alcohol per week. He reports that he does not use drugs.    Allergies  Allergies   Allergen Reactions    Ciprofloxacin Unspecified     Lethargic.skin turned yellow, could not pee  [id number]       Medications  Prior to Admission Medications   Prescriptions Last Dose Informant Patient Reported? Taking?   BRUKINSA 80 MG Cap   Yes No   Sig: Take 80 mg by mouth 2 times a day.   Degarelix Acetate (FIRMAGON, 240 MG DOSE, SC)   Yes No   Sig: Inject  under the skin every 6 months. Pt unkn dose   Leuprolide Acetate, 6 Month, (LUPRON DEPOT, 6-MONTH, IM)  Patient Yes No   Sig: Inject 45 mg into the shoulder, thigh, or buttocks.   Zanubrutinib (BRUKINSA) 80 MG Cap   No No   Sig: Take 2 capsules by mouth twice daily   Zanubrutinib (BRUKINSA) 80 MG Cap   No No   Si tab bid   silver sulfADIAZINE (SILVADENE) 1 % Cream   No No   Sig: Apply 1/8 inch layer to affected area BID      Facility-Administered Medications: None       Physical Exam  Temp:  [36.5 °C (97.7 °F)] 36.5 °C (97.7 °F)  Pulse:  [86] 86  Resp:  [12] 12  BP: (123)/(64) 123/64  SpO2:  [95 %] 95 %  Blood Pressure : 123/64   Temperature: 36.5 °C (97.7 °F)   Pulse: 86   Respiration: 12   Pulse Oximetry: 95 %       Physical Exam  Vitals and nursing note reviewed.   Constitutional:       General: He is not in acute distress.     Appearance: Normal appearance. He is not ill-appearing, toxic-appearing or  "diaphoretic.   HENT:      Head: Normocephalic and atraumatic.      Ears:      Comments: Necrotic left ear wound with significant amount of drainage and bleeding.  Dressings in place     Nose: No congestion or rhinorrhea.      Mouth/Throat:      Pharynx: No posterior oropharyngeal erythema.   Eyes:      General: No scleral icterus.        Right eye: No discharge.   Cardiovascular:      Rate and Rhythm: Normal rate and regular rhythm.      Pulses: Normal pulses.      Heart sounds: Normal heart sounds. No murmur heard.     No friction rub. No gallop.   Pulmonary:      Effort: Pulmonary effort is normal. No respiratory distress.      Breath sounds: Normal breath sounds. No stridor. No wheezing, rhonchi or rales.   Abdominal:      General: There is distension.      Tenderness: There is no abdominal tenderness.      Comments: Large hernia on the abdomen with a wound on the right side.  There is some drainage from this area.   Musculoskeletal:         General: No swelling, tenderness, deformity or signs of injury.      Cervical back: Normal range of motion.      Right lower leg: No edema.      Left lower leg: No edema.   Skin:     Capillary Refill: Capillary refill takes more than 3 seconds.      Coloration: Skin is not jaundiced or pale.      Findings: No bruising, erythema, lesion or rash.   Neurological:      General: No focal deficit present.      Mental Status: He is alert and oriented to person, place, and time.         Laboratory:          No results for input(s): \"ALTSGPT\", \"ASTSGOT\", \"ALKPHOSPHAT\", \"TBILIRUBIN\", \"DBILIRUBIN\", \"GAMMAGT\", \"AMYLASE\", \"LIPASE\", \"ALB\", \"PREALBUMIN\", \"GLUCOSE\" in the last 72 hours.      No results for input(s): \"NTPROBNP\" in the last 72 hours.      No results for input(s): \"TROPONINT\" in the last 72 hours.    Imaging:  No orders to display       X-Ray:  I have personally reviewed the images and compared with prior images.    Assessment/Plan:  Justification for Admission Status  I " anticipate this patient will require at least two midnights for appropriate medical management, necessitating inpatient admission because abscess left ear    Patient will need a Med/Surg bed on MEDICAL service .  The need is secondary to abscess left ear.    * Abscess of left external ear  Assessment & Plan  Patient presents with a necrotic wound and had a maggot infestation  The wound was bleeding upon arrival to the ER at 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    Incisional hernia- (present on admission)  Assessment & Plan  There is a wound on the right lower quadrant on top of his incisional hernia  Wound culture  Wound consult  Consider surgery consult to have a biopsied        VTE prophylaxis: SCDs/TEDs      I spent a total of 35 minutes of non face to face time performing additional research, reviewing medical records from transferring facility, discussing plan of care with other healthcare providers. Start time: 7 45 pm. End time: 8:20 pm

## 2024-05-04 NOTE — PROGRESS NOTES
Med Rec complete per patient   Allergies reviewed  Antibiotics in the past 30 days:no  Anticoagulant in past 14 days:no  Pharmacy patient utilizes:[facility] in [place]    Pt unable to verify Lyrica strength. Per Reconcile Outside Information Lyrica 50 mg on file (added to med rec)    Notified [facility] for     Pt states sometimes he'll take Lyrica TID but tries to keep it at BID    Pt states he received Lupron injection about a month ago at [facility]

## 2024-05-05 PROCEDURE — 99233 SBSQ HOSP IP/OBS HIGH 50: CPT | Performed by: HOSPITALIST

## 2024-05-05 RX ADMIN — OXYCODONE 2.5 MG: 5 TABLET ORAL at 01:06

## 2024-05-05 RX ADMIN — OXYCODONE 2.5 MG: 5 TABLET ORAL at 18:10

## 2024-05-05 RX ADMIN — OXYCODONE 2.5 MG: 5 TABLET ORAL at 13:21

## 2024-05-05 RX ADMIN — ACETAMINOPHEN 325 MG: 325 TABLET, FILM COATED ORAL at 05:57

## 2024-05-05 RX ADMIN — PIPERACILLIN AND TAZOBACTAM 4.5 G: 4; .5 INJECTION, POWDER, FOR SOLUTION INTRAVENOUS at 12:07

## 2024-05-05 RX ADMIN — NYSTATIN: 100000 POWDER TOPICAL at 18:10

## 2024-05-05 RX ADMIN — OXYCODONE 5 MG: 5 TABLET ORAL at 22:08

## 2024-05-05 RX ADMIN — ACETAMINOPHEN 325 MG: 325 TABLET, FILM COATED ORAL at 11:59

## 2024-05-05 RX ADMIN — PIPERACILLIN AND TAZOBACTAM 4.5 G: 4; .5 INJECTION, POWDER, FOR SOLUTION INTRAVENOUS at 21:06

## 2024-05-05 RX ADMIN — PIPERACILLIN AND TAZOBACTAM 4.5 G: 4; .5 INJECTION, POWDER, FOR SOLUTION INTRAVENOUS at 05:14

## 2024-05-05 RX ADMIN — NYSTATIN: 100000 POWDER TOPICAL at 05:16

## 2024-05-05 ASSESSMENT — ENCOUNTER SYMPTOMS
HEARTBURN: 0
CHILLS: 0
PND: 0
HEMOPTYSIS: 0
HEADACHES: 0
DEPRESSION: 0
COUGH: 0
FEVER: 0
VOMITING: 0
DIZZINESS: 0
WHEEZING: 0
BRUISES/BLEEDS EASILY: 0
BACK PAIN: 0
CLAUDICATION: 0
NAUSEA: 0
MYALGIAS: 0
BLURRED VISION: 0
DOUBLE VISION: 0
PALPITATIONS: 0

## 2024-05-05 ASSESSMENT — PAIN DESCRIPTION - PAIN TYPE
TYPE: ACUTE PAIN

## 2024-05-05 ASSESSMENT — PATIENT HEALTH QUESTIONNAIRE - PHQ9
1. LITTLE INTEREST OR PLEASURE IN DOING THINGS: NOT AT ALL
SUM OF ALL RESPONSES TO PHQ9 QUESTIONS 1 AND 2: 0
2. FEELING DOWN, DEPRESSED, IRRITABLE, OR HOPELESS: NOT AT ALL

## 2024-05-05 NOTE — PROGRESS NOTES
[facility] Medicine Daily Progress Note    Date of Service  5/5/2024    Chief Complaint  Wally Joel is a 84 y.o. male admitted 5/3/2024 with left ear wound    Hospital Course  Wally Joel is a 84 y.o. male who presented 5/3/2024 with Past medical history is skin cancer on the left ear, history of surgery and chemoradiation, prostate cancer, had umbilical hernia repair, ventral incisional hernia repair, history of urinary retention who presents to the hospital for a wound on his left ear.  Patient states that the wound has been present for more than a month.  He has tried and take care of it himself but is getting progressively worse.  On Monday he started hearing a buzzing sounds on his left ear and found to have maggots.  He presented today at [facility] and on his way to the hospital his left ear started bleeding.  He denies any fever, chills, nausea, vomiting, headaches, photophobia.  He has decreased hearing on his left ear.  He last received chemo and radiation in July 2023.  Patient also complains of a large ventricle hernia on the right lower quadrant with a wound that is draining as well.  It is questionable whether he has metastatic disease to the area.  The patient was transferred to [facility] for ENT consult.     At [facility] he was started on IV Zosyn.     WBC 14, hemoglobin 11, platelets 150, creatinine 1.44, lactic acid 1.2, bili 1.3     Wound cultures found having gram-positive cocci and gram-negative rods.     CT scan found inflammatory changes of the left ear and postauricular region.  1.4 x 1.2 cm collection of fluid seen along the skin surface extending to the subcutaneous fat.  This was suspicious for small abscess.  Inflammatory changes about the left mastoid there is suggestion of cortical lucencies and the lateral cortex of the left mastoid.  This does not demonstrate been represent postsurgical change or possibly early osteomyelitis of the  left mastoid.  Small amount of fluid in the dependent aspect of the left mastoid.  Multiple small mildly prominent cervical lymph node are present but this lymphadenopathy is much improved compared to 5/11/2023       Interval Problem Update  5/4 patient is sitting in bed, he is alert oriented follows commands, patient has left-sided ulcer in the back area of his ear, no fever no chills, I discussed with ENT doctor who do not, at this time he is recommending wound care evaluation, pending on wound care might need wound VAC or plastic surgery evaluation, will follow-up blood cultures, continue on Zosyn, I have discussed with bedside nurse charge nurse , will discuss with Dr. Millard radiation oncology.  5/5 patient is resting in bed, no nausea no vomiting, he feels okay, continue IV Zosyn, discussed with plastic surgery at this time they are not recommending any surgical intervention, wound care evaluated patient recommending outpatient wound care, follow-up final culture result    I have discussed this patient's plan of care and discharge plan at IDT rounds today with Case Management, Nursing, Nursing leadership, and other members of the IDT team.    Consultants/Specialty      Code Status  Full Code    Disposition  The patient is not medically cleared for discharge to home or a post-acute facility.      I have placed the appropriate orders for post-discharge needs.    Review of Systems  Review of Systems   Constitutional:  Negative for chills and fever.   HENT:          Left posterior ear wound, discharge.    Eyes:  Negative for blurred vision and double vision.   Respiratory:  Negative for cough, hemoptysis and wheezing.    Cardiovascular:  Negative for chest pain, palpitations, claudication, leg swelling and PND.   Gastrointestinal:  Negative for heartburn, nausea and vomiting.   Genitourinary:  Negative for hematuria and urgency.   Musculoskeletal:  Negative for back pain and myalgias.   Skin:   Negative for rash.   Neurological:  Negative for dizziness and headaches.   Endo/Heme/Allergies:  Does not bruise/bleed easily.   Psychiatric/Behavioral:  Negative for depression.         Physical Exam  Temp:  [35.9 °C (96.7 °F)-36.3 °C (97.3 °F)] 36.3 °C (97.3 °F)  Pulse:  [75-90] 80  Resp:  [14-18] 18  BP: (104-112)/(59-68) 112/63  SpO2:  [93 %-95 %] 93 %    Physical Exam  Vitals and nursing note reviewed.   Constitutional:       Appearance: He is ill-appearing.   HENT:      Ears:      Comments: Left posterior ear area with round wound/ulcer, discharge.   Eyes:      General:         Right eye: No discharge.         Left eye: No discharge.      Conjunctiva/sclera: Conjunctivae normal.      Pupils: Pupils are equal, round, and reactive to light.   Cardiovascular:      Rate and Rhythm: Normal rate and regular rhythm.      Pulses: Normal pulses.   Abdominal:      General: Bowel sounds are normal. There is no distension.      Tenderness: There is no abdominal tenderness.   Musculoskeletal:         General: Normal range of motion.      Cervical back: Normal range of motion and neck supple.   Skin:     General: Skin is warm.      Capillary Refill: Capillary refill takes less than 2 seconds.      Coloration: Skin is not jaundiced.   Neurological:      General: No focal deficit present.      Mental Status: He is alert.   Psychiatric:         Mood and Affect: Mood normal.         Fluids  No intake or output data in the 24 hours ending 05/05/24 1411    Laboratory  Recent Labs     05/03/24  2150   WBC 9.3   RBC 3.42*   HEMOGLOBIN 10.2*   HEMATOCRIT 30.5*   MCV 89.2   MCH 29.8   MCHC 33.4   RDW 44.2   PLATELETCT 116*   MPV 11.5     Recent Labs     05/03/24  2150   SODIUM 137   POTASSIUM 4.5   CHLORIDE 104   CO2 21   GLUCOSE 140*   BUN 25*   CREATININE 1.21   CALCIUM 9.2                   Imaging  No orders to display        Assessment/Plan  * Abscess of left external ear  Assessment & Plan  Patient presents with a necrotic  wound and had a maggot infestation  The wound was bleeding upon arrival to the ER at [facility]  IV Zosyn  Discussed with DR Beal ENT, I this time he is recommending to continue supportive treatment, wound care eval, pending on wound care evel might need plastic surgery consult  F/u cx results.   I have discussed with plastic surgery Dr. Garrett who at this time is recommending follow-up with patient's main ENT and if they feel that they need plastic surgery they can discuss plan of care, will try to discuss with Dr. Hill  tomorrow.   Continue IV antibiotics follow-up cultures.  Preliminary wound culture report showing Proteus and Staphylococcus      Mastoiditis  Assessment & Plan  According to CT scan there may be some evidence of mastoiditis on CT scan  Start IV Zosyn  Iv abx for now.    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 In review of yesterday's note (5/4/2024), there are no changes except as documented above.    Total time of 53 minutes spent prepping to see patient (e.g. reviewing  tests/imaging results, notes from consultants, bedside nurse, night shift ) obtaining and/or reviewing separately obtained history. Performing a medically appropriate examination and evaluation.  Counseling and educating the patient.  Ordering medications, tests, or procedures.  Referring and communicating with other health care professionals.  Documenting clinical information in EPIC.   Independently interpreting results and communicating results to patient.  Care coordination.

## 2024-05-05 NOTE — PROGRESS NOTES
Received report and assumed care of patient at change of shift. Patient A&Ox4 , on RAa , and reports 3/10 pain at this time. Declines medication for pain at this time. Patient assessment completed, bed in lowest position, and call light and personal belongings within reach. Patient expressed no further needs at this time.

## 2024-05-05 NOTE — CARE PLAN
The patient is Stable - Low risk of patient condition declining or worsening    Shift Goals  Clinical Goals: Pt will receive IV abx, maintain skin integrity around wound sites, and manage pain with medications as needed  Patient Goals: Pt will be able to sleep comfortably.  Family Goals: JOSE R    Progress made toward(s) clinical / shift goals:  Pt received IV abx and skin integrity around dressing sites was maintained with dressings.  Dressing remained clean, dry, and intact.  Pt ear pain was managed with pain medication as needed.  Pt was able to sleep throughout the night.    Patient is not progressing towards the following goals:

## 2024-05-05 NOTE — WOUND TEAM
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), EARS;;  Surgeon: Raza Lino M.D.;  Location: SURGERY SAME DAY [facility];  Service: Ent    MA EXPLORATORY OF ABDOMEN N/A 11/20/2020    Procedure: LAPAROTOMY, EXPLORATORY, PSB BOWEL REPAIR;  Surgeon: Pili Galicia M.D.;  Location: SURGERY [facility];  Service: General    VENTRAL HERNIA REPAIR N/A 11/20/2020    Procedure: REPAIR, HERNIA, VENTRAL;  Surgeon: Pili Galicia M.D.;  Location: SURGERY [facility];  Service: General    CYSTOSCOPY  3/16/2018    Procedure: CYSTOSCOPY- LITHOPAXY;  Surgeon: Rui Tang M.D.;  Location: SURGERY [facility];  Service: Urology    INGUINAL HERNIA LAPAROSCOPIC  3/1/2017    Procedure: INGUINAL HERNIA LAPAROSCOPIC RT AND INCISIONAL WITH MESH;  Surgeon: Kory Suero M.D.;  Location: SURGERY [facility];  Service:     CYSTOSCOPY  10/29/2014    Performed by Rui Tang M.D. at [facility]    TRANS URETHRAL RESECTION PROSTATE  10/29/2014    Performed by Rui Tang M.D. at [facility]    OTHER ABDOMINAL SURGERY  2010    ventral hernia    OTHER ORTHOPEDIC SURGERY  1988, 1989    knee scope x 2     Social History     Tobacco Use    Smoking status: Never    Smokeless tobacco: Never   Substance Use Topics    Alcohol use: Yes     Alcohol/week: 0.6 oz     Types: 1 Cans of beer per week     Comment: 1 per month     No chief complaint on file.    Diagnosis: Abscess [L02.91]    Unit where seen by Wound Team: S520/01     WOUND CONSULT RELATED TO:  L ear and  abdomen    WOUND TEAM PLAN OF CARE - Frequency of Follow-up:   Nursing to follow dressing orders written for wound care. Contact wound team if area fails to progress, deteriorates or with any questions/concerns if something comes up before next scheduled follow up (See below as to whether wound is following and frequency of wound follow up)   Not following, consult as needed  -      WOUND HISTORY:   Pt is an 83 yo male who presented to [facility] and transferred to [facility].  Pt had maggots in the L mastoid wound that were cleaned out.  Pt states he was using H2O2 to clean wound.  Pt is aware that wound communicates to ear canal.  Pt states his abdominal wound has been present for about a month.      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 [place]  7/23 radiation L ear Dr Torres  12/23 Dr Wright oncology, PET scan, no surgery or radiation recommended        WOUND ASSESSMENT/LDA  Wound 04/24/23 Incision Ear Left rashawn (Active)   Date First Assessed/Time First Assessed: 04/24/23 1405   Primary Wound Type: Incision  Location: Ear  Laterality: Left  Wound Description (Comments): rashawn      Assessments 5/4/2024  6:00 PM   Wound Image      Drainage Amount Small   Drainage Description Serosanguineous   Dressing Changed Changed   Dressing Options Hydrofiber Silver;Silicone Adhesive Foam   Dressing Change/Treatment Frequency Every 72 hrs, and As Needed   NEXT Dressing Change/Treatment Date 05/07/24   NEXT Weekly Photo (Inpatient Only) 05/11/24   Wound Team Following Other (comment)   Wound Length (cm) 5.2 cm   Wound Width (cm) 2.2 cm   Wound Surface Area (cm^2) 11.44 cm^2   Wound Bed Granulation (%) 25 %   Wound Bed Slough (%) 75 %   Wound Odor Mild       Wound 05/03/24 Other (comment)  Abdomen Lower Right on umbelical hernia (Active)   Date First Assessed/Time First Assessed: 05/03/24 1200   Present on Original Admission: Yes  Hand Hygiene Completed: Yes  Primary Wound Type: Other (comment)   Surgical Wound Type: (c)   Location: Abdomen  Wound Orientation: Lower  Laterality: Right  W...      Assessments 5/4/2024  6:00 PM   Wound Image     Periwound Assessment Dry   Drainage Amount Scant   Drainage Description Serosanguineous   Dressing Changed Changed   Dressing Options Hydrocolloid Thin;Silicone Adhesive Foam   Dressing Change/Treatment Frequency Every 72 hrs, and As Needed   NEXT Dressing Change/Treatment Date 05/07/24   NEXT Weekly Photo (Inpatient Only) 05/11/24   Wound Team Following Other (comment)   Wound Length (cm) 2 cm   Wound Width (cm) 2 cm   Wound Depth (cm) 0.1 cm   Wound Surface Area (cm^2) 4 cm^2   Wound Volume (cm^3) 0.4 cm^3   Wound Bed Granulation (%) 100 %   Wound Odor None   WOUND NURSE ONLY - Time Spent with Patient (mins) 60        Vascular:    SHAKIR:   No results found.    Lab Values:    Lab Results   Component Value Date/Time    WBC 9.3 05/03/2024 09:50 PM    RBC 3.42 (L) 05/03/2024 09:50 PM    HEMOGLOBIN 10.2 (L) 05/03/2024 09:50 PM    HEMATOCRIT 30.5 (L) 05/03/2024 09:50 PM    CREACTPROT 1.66 (H) 05/03/2024 09:50 PM    SEDRATEWES 18 05/03/2024 09:50 PM         Culture Results show:  No results found for this or any previous visit (from the past 720 hour(s)).    Pain Level/Medicated:  None, Tolerated without pain medication       INTERVENTIONS BY WOUND TEAM:  Chart and images reviewed. Discussed with bedside RN. All areas of concern (based on picture review, LDA review and discussion with bedside RN) have been thoroughly assessed. Documentation of areas based on significant findings. This RN in to assess patient. Performed standard wound care which includes appropriate positioning, dressing removal and non-selective debridement. Pictures and measurements obtained weekly if/when required.    Wound:  L EAR  Preparation for Dressing removal: Removed without difficulty  Cleansed/Non-selectively Debrided with:  Normal Saline and Gauze  Shelby wound: Cleansed with Normal Saline and Gauze,  Prepped with No Sting  Primary Dressing:  aquacel ag, silicone foam     Wound:  abdomen  Cleansed/Non-selectively Debrided with:  Normal Saline and Gauze  Shelby wound: Cleansed with Normal Saline and Gauze, Prepped with No Sting  Primary Dressing:  hydrocolloid and silicone foam    Advanced Wound Care Discharge Planning  Number of Clinicians necessary to complete wound care: 1  Is patient requiring IV pain medications for dressing changes:  No   Length of time for dressing change 45 min. (This does not include chart review, pre-medication time, set up, clean up or time spent charting.)    Interdisciplinary consultation: Patient, Bedside RN (), .    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  Wound care goal is to avoid secondary infection, healing potential may be limited.  Abdominal wound should resolve with moist wound care.           Goals: granular tissue formation L mastoid, reduction in area abdominal wound    NURSING PLAN OF CARE ORDERS:  Dressing changes: See Dressing Care orders    NUTRITION            PREVENTATIVE INTERVENTIONS:    Q shift Ollie - performed per nursing policy  Q shift pressure point assessments - performed per nursing policy        Anticipated discharge plans:  Outpatient Wound Center    pt will need follow up for L mastoid and abdominal wounds    Vac Discharge Needs:  Vac Discharge plan is purely a recommendation from wound team and not a requirement for discharge unless otherwise stated by physician.  Not Applicable Pt not on a wound vac

## 2024-05-05 NOTE — CARE PLAN
The patient is Stable - Low risk of patient condition declining or worsening    Shift Goals  Clinical Goals: wound care, iv abx, pain control  Patient Goals: pain control  Family Goals: JOSE R    Progress made toward(s) clinical / shift goals:  patients pain was manageable and stayed around a 3 today. Patient also prefers to take tylenol even when pain was at a 5/10 and oxycodone was available. Wound care came and saw and dressed wounds.  Problem: Pain - Standard  Goal: Alleviation of pain or a reduction in pain to the patient’s comfort goal of 2/10 by end of shift by end of shift with medications per MAR and heat and ice packs were offered throughout shift  Outcome: Progressing     Problem: Wound/ / Incision Healing  Goal: Patient's wound/surgical incision will decrease in size and heals properly by wound team coming to consult and redress injuries. Photos were input into flowsheet, and monitoring q shift today by end of day  Outcome: Progressing       Patient is not progressing towards the following goals:

## 2024-05-05 NOTE — PROGRESS NOTES
Received report and assumed care of patient at change of shift. Patient is A&Ox4, on RA, and reports 3/10 L ear pain at this time. Declines interventions and provided education for alternatives. Pt verbally acknowledged. Patient assessment completed, bed in lowest position, and call light and personal belongings are within reach. Patient expressed no further needs at this time.

## 2024-05-06 ENCOUNTER — PHARMACY VISIT (OUTPATIENT)
Dept: PHARMACY | Facility: MEDICAL CENTER | Age: 84
End: 2024-05-06
Payer: MEDICARE

## 2024-05-06 VITALS
HEIGHT: 61 IN | RESPIRATION RATE: 18 BRPM | SYSTOLIC BLOOD PRESSURE: 100 MMHG | OXYGEN SATURATION: 95 % | BODY MASS INDEX: 28.43 KG/M2 | DIASTOLIC BLOOD PRESSURE: 58 MMHG | TEMPERATURE: 96.6 F | WEIGHT: 150.57 LBS | HEART RATE: 76 BPM

## 2024-05-06 PROBLEM — H60.02 ABSCESS OF LEFT EXTERNAL EAR: Status: RESOLVED | Noted: 2024-05-03 | Resolved: 2024-05-06

## 2024-05-06 PROBLEM — H70.90 MASTOIDITIS: Status: RESOLVED | Noted: 2024-05-03 | Resolved: 2024-05-06

## 2024-05-06 LAB
ANION GAP SERPL CALC-SCNC: 14 MMOL/L (ref 7–16)
BACTERIA WND AEROBE CULT: ABNORMAL
BUN SERPL-MCNC: 21 MG/DL (ref 8–22)
CALCIUM SERPL-MCNC: 9.3 MG/DL (ref 8.5–10.5)
CHLORIDE SERPL-SCNC: 101 MMOL/L (ref 96–112)
CO2 SERPL-SCNC: 20 MMOL/L (ref 20–33)
CREAT SERPL-MCNC: 1.19 MG/DL (ref 0.5–1.4)
ERYTHROCYTE [DISTWIDTH] IN BLOOD BY AUTOMATED COUNT: 41.4 FL (ref 35.9–50)
GFR SERPLBLD CREATININE-BSD FMLA CKD-EPI: 61 ML/MIN/1.73 M 2
GLUCOSE SERPL-MCNC: 115 MG/DL (ref 65–99)
GRAM STN SPEC: ABNORMAL
GRAM STN SPEC: ABNORMAL
HCT VFR BLD AUTO: 34.3 % (ref 42–52)
HGB BLD-MCNC: 11.3 G/DL (ref 14–18)
MCH RBC QN AUTO: 29 PG (ref 27–33)
MCHC RBC AUTO-ENTMCNC: 32.9 G/DL (ref 32.3–36.5)
MCV RBC AUTO: 87.9 FL (ref 81.4–97.8)
PLATELET # BLD AUTO: 140 K/UL (ref 164–446)
PMV BLD AUTO: 11.4 FL (ref 9–12.9)
POTASSIUM SERPL-SCNC: 3.9 MMOL/L (ref 3.6–5.5)
RBC # BLD AUTO: 3.9 M/UL (ref 4.7–6.1)
SIGNIFICANT IND 70042: ABNORMAL
SIGNIFICANT IND 70042: ABNORMAL
SITE SITE: ABNORMAL
SITE SITE: ABNORMAL
SODIUM SERPL-SCNC: 135 MMOL/L (ref 135–145)
SOURCE SOURCE: ABNORMAL
SOURCE SOURCE: ABNORMAL
WBC # BLD AUTO: 13.1 K/UL (ref 4.8–10.8)

## 2024-05-06 PROCEDURE — 99239 HOSP IP/OBS DSCHRG MGMT >30: CPT | Performed by: HOSPITALIST

## 2024-05-06 PROCEDURE — RXMED WILLOW AMBULATORY MEDICATION CHARGE: Performed by: HOSPITALIST

## 2024-05-06 RX ORDER — AMOXICILLIN AND CLAVULANATE POTASSIUM 875; 125 MG/1; MG/1
1 TABLET, FILM COATED ORAL EVERY 12 HOURS
Status: DISCONTINUED | OUTPATIENT
Start: 2024-05-06 | End: 2024-05-06 | Stop reason: HOSPADM

## 2024-05-06 RX ORDER — NYSTATIN 100000 [USP'U]/G
1 POWDER TOPICAL 2 TIMES DAILY
Qty: 15 G | Refills: 0 | Status: SHIPPED | OUTPATIENT
Start: 2024-05-06 | End: 2024-05-14

## 2024-05-06 RX ORDER — AMOXICILLIN AND CLAVULANATE POTASSIUM 875; 125 MG/1; MG/1
1 TABLET, FILM COATED ORAL EVERY 12 HOURS
Qty: 14 TABLET | Refills: 0 | Status: ACTIVE | OUTPATIENT
Start: 2024-05-06 | End: 2024-05-13

## 2024-05-06 RX ADMIN — OXYCODONE 2.5 MG: 5 TABLET ORAL at 07:49

## 2024-05-06 RX ADMIN — OXYCODONE 2.5 MG: 5 TABLET ORAL at 14:07

## 2024-05-06 RX ADMIN — OXYCODONE 2.5 MG: 5 TABLET ORAL at 04:41

## 2024-05-06 RX ADMIN — PIPERACILLIN AND TAZOBACTAM 4.5 G: 4; .5 INJECTION, POWDER, FOR SOLUTION INTRAVENOUS at 04:31

## 2024-05-06 ASSESSMENT — PAIN DESCRIPTION - PAIN TYPE
TYPE: ACUTE PAIN
TYPE: ACUTE PAIN

## 2024-05-06 NOTE — DISCHARGE PLANNING
Case Management Discharge Planning    Admission Date: 5/3/2024  GMLOS: 2.1  ALOS: 3    6-Clicks ADL Score: 24  6-Clicks Mobility Score: 24      Anticipated Discharge Dispo: Discharge Disposition: Discharged to home/self care (01)  Discharge Address: [address] [place] [address]    DME Needed: No    Action(s) Taken: OTHER    Per MD, the anticipated discharge disposition is Home with O/P Wound Care. Per Stacie, the wound care will be done today prior to discharge, patient needs the next dressing charge Thursday May 9, 2024.    DOMINIC met with patient at bedside. Patient reports he will return to [place] Wednesday May 8, 2024.    Patient requested the wound care is arranged in [place]. DOMINIC spoke with Marie at [facility] [phone] FAX [phone].    Clinical Documentation faxed. Per Marie's Dr. Mona Lozano will review the clinical documentation.     1:00pm - TC from Marie. Per MD Marie is out of the office next week and is not able to accommodate the dressing changes, patient aware.    Patient indicates he has reliable transportation and is able to come to [place] for the wound dressing changes.    DOMINIC spoke with Marce at the [facility]. Per Marce, appointment is scheduled for Thursday May 9 at 1:45pm.     Escalations Completed: None    Medically Clear: Yes    Next Steps: Awaiting update from [facility].    1:00pm - Home with O/P Wound Care.    Barriers to Discharge: None    Is the patient up for discharge tomorrow:

## 2024-05-06 NOTE — DISCHARGE PLANNING
Care Transition Team Assessment    SW met with patient at bedside. Patient verified the demographic information in the Face Sheet.    Patient lives alone. Patient lives in [place]. Patient reports he will stay at his friend Harry's home in [place] for a few days for doctors appointments.    Patient reports he will be picked up by Harry [phone]. Patient indicates he does not own any DME.    Patient's PCP is Dr. Lozano with [facility].    Based on information received, the anticipated discharge disposition is Home with O/P Services.      Information Source  Orientation Level: Oriented X4  Information Given By: Patient  Informant's Name: Wally  Who is responsible for making decisions for patient? : Patient    Readmission Evaluation  Is this a readmission?: No    Elopement Risk  Legal Hold: No  Ambulatory or Self Mobile in Wheelchair: Yes  Disoriented: No  Psychiatric Symptoms: None  History of Wandering: No  Elopement this Admit: No  Vocalizing Wanting to Leave: No  Displays Behaviors, Body Language Wanting to Leave: No-Not at Risk for Elopement  Elopement Risk: Not at Risk for Elopement    Interdisciplinary Discharge Planning  Lives with - Patient's Self Care Capacity: Alone and Able to Care For Self  Patient or legal guardian wants to designate a caregiver: No  Support Systems: Friends / Neighbors  Housing / Facility: 1 Story House  Name of Care Facility: na  Durable Medical Equipment: Not Applicable  DME Provider / Phone: [facility]    Discharge Preparedness  What is your plan after discharge?: Home with help  What are your discharge supports?: Other (comment)  Prior Functional Level: Needs Assist with Medication Management, Needs Assist with Activities of Daily Living  Difficulity with ADLs: Other    Functional Assesment  Prior Functional Level: Needs Assist with Medication Management, Needs Assist with Activities of Daily Living    Finances  Financial Barriers to Discharge: No  Prescription  Coverage: Yes    Vision / Hearing Impairment  Vision Impairment : Yes  Right Eye Vision: Impaired, Wears Glasses  Left Eye Vision: Impaired, Wears Glasses  Hearing Impairment : Yes  Hearing Impairment: Left Ear  Does Pt Need Special Equipment for the Hearing Impaired?: No         Advance Directive  Advance Directive?: None  Advance Directive offered?: AD Booklet refused    Domestic Abuse  Have you ever been the victim of abuse or violence?: No  Physical Abuse or Sexual Abuse: No  Verbal Abuse or Emotional Abuse: No  Possible Abuse/Neglect Reported to:: Not Applicable    Psychological Assessment  History of Substance Abuse: None  History of Psychiatric Problems: No    Discharge Risks or Barriers  Discharge risks or barriers?: Complex medical needs  Patient risk factors: Complex medical needs    Anticipated Discharge Information  Discharge Disposition: Discharged to home/self care (01)  Discharge Address: [address] [place] [address]

## 2024-05-06 NOTE — PROGRESS NOTES
"Received alert and oriented x 4. Check vitals sign and recorded accordingly and due med given per MAR. Monitor sign and symptoms of respiratory distress and treatment given accordingly per MAR.Medicated per MAR and reassessed every 2 hours per protocol. Call light within reach. Steady on his feet.  Needs attended. Continue to monitor./64   Pulse 83   Temp 36.3 °C (97.4 °F) (Temporal)   Resp 18   Ht 1.55 m (5' 1.02\")   Wt 68.3 kg (150 lb 9.2 oz)   SpO2 96%   BMI 28.43 kg/m² .Offered ice pack for left ear pain.  "

## 2024-05-06 NOTE — PROGRESS NOTES
Report received from NOC RN and assumed patient care at 0700. Patient is A&Ox 4 and on room air. Patient reporting a pain level of 4/10 (medication provided). L ear dressing is clean/dry/intact. IV antibiotics running. Pt has straight cath supplies available if needed.     Call light within reach and bed in lowest position. Reinforced the need to call for assistance. Plan of care discussed and patient does not have any further needs at this time.

## 2024-05-06 NOTE — PROGRESS NOTES
Noted large drain on pt wound area,(left ear) pt reports its been dripping for a while now.Dressing changes done on, pt medicated for pain as well.

## 2024-05-06 NOTE — CARE PLAN
The patient is Stable - Low risk of patient condition declining or worsening    Shift Goals  Clinical Goals: iv abx, pain control,  Patient Goals: pain control  Family Goals: JOSE R    Progress made toward(s) clinical / shift goals:  patients pain has been going between a 3 and a 6. We have been using half a dose of tylenol (per patient request) and oxy 2.5. patient states that the oxy 2.5 helps more than the tylenol and has gotten pain level from a 6 to a 3/10.     Problem: Pain - Standard  Goal: Alleviation of pain or a reduction in pain to the patient’s comfort goal of 3/10 by using tylenol and oxy 2.5 by end of shift  Outcome: Progressing     Problem: Infection - Standard  Goal: Patient will remain free from infection by completing scheduled abx by end of shift  Outcome: Progressing     Problem: Wound/ / Incision Healing  Goal: Patient's wound/surgical incision will decrease in size and heals properly by maintaining integrity of dressings and changing PRN per wound orders by end of shift  Outcome: Progressing       Patient is not progressing towards the following goals:

## 2024-05-06 NOTE — DISCHARGE INSTRUCTIONS
Abscess  Care After  An abscess (also called a boil or furuncle) is an infected area that contains a collection of pus. Signs and symptoms of an abscess include pain, tenderness, redness, or hardness, or you may feel a moveable soft area under your skin. An abscess can occur anywhere in the body. The infection may spread to surrounding tissues causing cellulitis. A cut (incision) by the surgeon was made over your abscess and the pus was drained out. Gauze may have been packed into the space to provide a drain that will allow the cavity to heal from the inside outwards. The boil may be painful for 5 to 7 days. Most people with a boil do not have high fevers. Your abscess, if seen early, may not have localized, and may not have been lanced. If not, another appointment may be required for this if it does not get better on its own or with medications.  HOME CARE INSTRUCTIONS   Only take over-the-counter or prescription medicines for pain, discomfort, or fever as directed by your caregiver.  When you bathe, soak and then remove gauze or iodoform packs at least daily or as directed by your caregiver. You may then wash the wound gently with mild soapy water. Repack with gauze or do as your caregiver directs.  SEEK IMMEDIATE MEDICAL CARE IF:   You develop increased pain, swelling, redness, drainage, or bleeding in the wound site.  You develop signs of generalized infection including muscle aches, chills, fever, or a general ill feeling.  An oral temperature above 102° F (38.9° C) develops, not controlled by medication.  See your caregiver for a recheck if you develop any of the symptoms described above. If medications (antibiotics) were prescribed, take them as directed.  Document Released: 07/06/2006 Document Revised: 03/11/2013 Document Reviewed: 03/02/2009  [employer]® Patient Information ©2014 [employer].

## 2024-05-06 NOTE — DISCHARGE SUMMARY
Discharge Summary    CHIEF COMPLAINT ON ADMISSION  No chief complaint on file.      Reason for Admission  abscess     Admission Date  5/3/2024    CODE STATUS  Full Code    HPI & HOSPITAL COURSE    Please see original H&P for specific information.   Wally Joel is a 84 y.o. male who presented 5/3/2024 with Past medical history is skin cancer on the left ear, history of surgery and chemoradiation, prostate cancer, had umbilical hernia repair, ventral incisional hernia repair, history of urinary retention who presents to the hospital for a wound on his left ear.  Patient states that the wound has been present for more than a month.  He has tried and take care of it himself but is getting progressively worse.  On Monday he started hearing a buzzing sounds on his left ear and found to have maggots.  He presented today at [facility] and on his way to the hospital his left ear started bleeding.  He denies any fever, chills, nausea, vomiting, headaches, photophobia.  He has decreased hearing on his left ear.  He last received chemo and radiation in July 2023.  Patient also complains of a large ventricle hernia on the right lower quadrant with a wound that is draining as well.  It is questionable whether he has metastatic disease to the area.  The patient was transferred to [facility] for ENT consult.     At [facility] he was started on IV Zosyn.     WBC 14, hemoglobin 11, platelets 150, creatinine 1.44, lactic acid 1.2, bili 1.3     Wound cultures found having gram-positive cocci and gram-negative rods.     CT scan found inflammatory changes of the left ear and postauricular region.  1.4 x 1.2 cm collection of fluid seen along the skin surface extending to the subcutaneous fat.  This was suspicious for small abscess.  Inflammatory changes about the left mastoid there is suggestion of cortical lucencies and the lateral cortex of the left mastoid.  This does not demonstrate been  represent postsurgical change or possibly early osteomyelitis of the left mastoid.  Small amount of fluid in the dependent aspect of the left mastoid.  Multiple small mildly prominent cervical lymph node are present but this lymphadenopathy is much improved compared to 5/11/2023 5/4 patient is sitting in bed, he is alert oriented follows commands, patient has left-sided ulcer in the back area of his ear, no fever no chills, I discussed with ENT doctor who do not, at this time he is recommending wound care evaluation, pending on wound care might need wound VAC or plastic surgery evaluation, will follow-up blood cultures, continue on Zosyn, I have discussed with bedside nurse charge nurse , will need follow-up with Dr. Millard radiation oncology as outpatient on Wednesday 5/5 patient is resting in bed, no nausea no vomiting, he feels okay, continue IV Zosyn, discussed with plastic surgery at this time they are not recommending any surgical intervention, wound care evaluated patient recommending outpatient wound care, follow-up final culture result    Wound cultures positive for MSSA and Proteus sensitive to Augmentin, patient will continue on oral Augmentin for at least 7 more days, I discussed with Dr. Lino patient's ENT, he is recommending follow-up as outpatient, I have placed contact information and patient discharged papers, Dr. Lino also stated that he has patient's information and he will follow-up with patient.  Patient today is feeling well, he is afebrile, no signs of worsening infection, wound care as outpatient has been arranged by , patient will follow-up with radiation oncology on Wednesday, patient again is alert oriented follows commands he has expressed understanding of his plan of care and agree with either.  Answered.            Therefore, he is discharged in good and stable condition to home with close outpatient follow-up.    The patient met 2-midnight criteria for  "an inpatient stay at the time of discharge.    Discharge Date  5/6/24    FOLLOW UP ITEMS POST DISCHARGE  Primary care physician  Radiation oncology  ENT    DISCHARGE DIAGNOSES  Principal Problem (Resolved):    Abscess of left external ear (POA: Unknown)  Active Problems:    Chronic lymphocytic leukemia (CLL), B-cell (HCC) (POA: Yes)    Incisional hernia (POA: Yes)    SCC (squamous cell carcinoma), ear, left (POA: Yes)  Resolved Problems:    Abscess (POA: Yes)    Mastoiditis (POA: Unknown)      FOLLOW UP  Future Appointments   Date Time Provider Department Center   5/9/2024  1:30 PM Lupe PATEL M.D. RADT None   5/9/2024  2:00 PM Anibal Temple M.D. [address]   5/16/2024  9:30 AM Dk Salas R.N. [address]   5/23/2024  2:00 PM Korin Vickers R.N. [address]   5/30/2024 10:00 AM Leslie Banda R.N. [address]   6/6/2024  9:00 AM Amena Causey R.N. [address]   6/13/2024  9:30 AM Leslie Banda R.N. [address]     Raza Lino M.D.  [address] [facility] [address]  [phone]    Follow up in 1 week(s)      Lupe PATEL M.D.  [address] [facility]  [address]  [facility] [address]  [phone]    Follow up in 2 day(s)      ROBBY Gabriel  [address] [facility] [address]  [phone]    Follow up in 1 week(s)        MEDICATIONS ON DISCHARGE     Medication List        START taking these medications        Instructions   amoxicillin-clavulanate 875-125 MG Tabs  Commonly known as: Augmentin   Take 1 Tablet by mouth every 12 hours for 7 days.  Dose: 1 Tablet     nystatin powder  Commonly known as: Mycostatin   Apply 1 g topically 2 times a day for 4 days. Apply to groin  Dose: 1 Application            CONTINUE taking these medications        Instructions   LUPRON DEPOT (6-MONTH) IM   Inject  into the shoulder, thigh, or buttocks every 6 months. \"[facility] [place]\"     Non Formulary Request   Take 1 Tablet by mouth 1 time a day as needed (pain). \"Advil + " "Tylenol combo\" OTC  Dose: 1 Tablet     pregabalin 50 MG capsule  Commonly known as: Lyrica   Take 50 mg by mouth 2 times a day.  Dose: 50 mg            STOP taking these medications      hydrophilic ointment Oint              Allergies  Allergies   Allergen Reactions    Ciprofloxacin Unspecified     Lethargic.skin turned yellow, could not pee  IXC=[id number]       DIET  Orders Placed This Encounter   Procedures    Diet Order Diet: Regular     Standing Status:   Standing     Number of Occurrences:   1     Order Specific Question:   Diet:     Answer:   Regular [1]       ACTIVITY  As tolerated.  Weight bearing as tolerated    CONSULTATIONS  ENT phone call  Plastic surgery phone call    PROCEDURES  None    LABORATORY  Lab Results   Component Value Date    SODIUM 135 05/06/2024    POTASSIUM 3.9 05/06/2024    CHLORIDE 101 05/06/2024    CO2 20 05/06/2024    GLUCOSE 115 (H) 05/06/2024    BUN 21 05/06/2024    CREATININE 1.19 05/06/2024        Lab Results   Component Value Date    WBC 13.1 (H) 05/06/2024    HEMOGLOBIN 11.3 (L) 05/06/2024    HEMATOCRIT 34.3 (L) 05/06/2024    PLATELETCT 140 (L) 05/06/2024        Total time of the discharge process exceeds 33 minutes.  "

## 2024-05-06 NOTE — CARE PLAN
The patient is Stable - Low risk of patient condition declining or worsening    Shift Goals  Clinical Goals: wound care, IV antibiotics, safety, pain control  Patient Goals: pain control, comfort, sleep  Family Goals: JOSE R    Progress made toward(s) clinical / shift goals:        Problem: Knowledge Deficit - Standard  Goal: Patient and family/care givers will demonstrate understanding of plan of care, disease process/condition, diagnostic tests and medications  Outcome: Progressing  Note: Patient updated on plan of care by team members.     Problem: Pain - Standard  Goal: Alleviation of pain or a reduction in pain to the patient’s comfort goal  Outcome: Progressing  Note: Patient maintains 3/10 pain level with interventions in place.     Problem: Infection - Standard  Goal: Patient will remain free from infection  Outcome: Progressing  Note: Patient demonstrates self care, educated about infection prevention.      Problem: Wound/ / Incision Healing  Goal: Patient's wound/surgical incision will decrease in size and heals properly  Outcome: Progressing  Note: Dressings cleaned per protocol, remain clean/dry/intact       Patient is not progressing towards the following goals:

## 2024-05-06 NOTE — DISCHARGE PLANNING
Meds-to-Beds: Discharge prescription orders listed below delivered to patient in discharge lounge. RN notified. Patient counseled. Co-payment processed by pharmacy.         Current Outpatient Medications   Medication Sig Dispense Refill    amoxicillin-clavulanate (AUGMENTIN) 875-125 MG Tab Take 1 Tablet by mouth every 12 hours for 7 days. 14 Tablet 0    nystatin (MYCOSTATIN) powder Apply 1 g topically 2 times a day for 4 days. Apply to groin 15 g 0      Rosemary Marti, ChiquiD

## 2024-05-09 ENCOUNTER — HOSPITAL ENCOUNTER (OUTPATIENT)
Dept: RADIATION ONCOLOGY | Facility: MEDICAL CENTER | Age: 84
End: 2024-05-09
Attending: RADIOLOGY
Payer: MEDICARE

## 2024-05-09 ENCOUNTER — OFFICE VISIT (OUTPATIENT)
Dept: WOUND CARE | Facility: MEDICAL CENTER | Age: 84
End: 2024-05-09
Attending: HOSPITALIST
Payer: MEDICARE

## 2024-05-09 VITALS
HEART RATE: 78 BPM | SYSTOLIC BLOOD PRESSURE: 152 MMHG | BODY MASS INDEX: 28.39 KG/M2 | DIASTOLIC BLOOD PRESSURE: 72 MMHG | TEMPERATURE: 98.2 F | OXYGEN SATURATION: 97 % | WEIGHT: 150.35 LBS

## 2024-05-09 DIAGNOSIS — C44.229 SCC (SQUAMOUS CELL CARCINOMA), EAR, LEFT: ICD-10-CM

## 2024-05-09 DIAGNOSIS — Z91.199 NO-SHOW FOR APPOINTMENT: ICD-10-CM

## 2024-05-09 PROCEDURE — 99999 PR NO CHARGE: CPT | Performed by: NURSE PRACTITIONER

## 2024-05-09 PROCEDURE — 99215 OFFICE O/P EST HI 40 MIN: CPT | Performed by: RADIOLOGY

## 2024-05-09 PROCEDURE — 1125F AMNT PAIN NOTED PAIN PRSNT: CPT | Performed by: NURSE PRACTITIONER

## 2024-05-09 ASSESSMENT — PAIN SCALES - GENERAL: PAINLEVEL: 3=SLIGHT PAIN

## 2024-05-09 ASSESSMENT — FIBROSIS 4 INDEX: FIB4 SCORE: 1.38

## 2024-05-09 NOTE — WOUND TEAM
Patient is a no call, no show for today's wound care new evaluation. Called number on file, no personal identifiers, unable to leave detailed voicemail. Spoke to Marce Granados, practice  who states patient has called and is rescheduling patient.

## 2024-05-09 NOTE — PROGRESS NOTES
RADIATION ONCOLOGY FOLLOW-UP    Patient name:  Wally Joel    Primary Physician:  ROBBY Gabriel MRN: [id number]  [facility]: [id number]   Referring physician:  Jose R Wrigth M.D.   : 1940, 84 y.o.     DATE OF SERVICE: 2024    IDENTIFICATION:   A 84 y.o. male with   Visit Diagnoses     ICD-10-CM   1. SCC (squamous cell carcinoma), ear, left  C44.229     SCC (squamous cell carcinoma), ear, left  Staging form: Cutaneous Carcinoma of the Head and Neck, AJCC 8th Edition.  - Pathologic stage from 2023: Stage III (pT3, pN1, cM0) - Signed by Lupe PATEL M.D. on 2023  Histopathologic type: Squamous cell carcinoma, NOS  Histologic grade (G): G3  Histologic grading system: 4 grade system  Residual tumor (R): R0 - None  Laterality: Left  Lymph-vascular invasion (LVI): LVI not present (absent)/not identified  Extraosseous extension: Absent      RADIATION SUMMARY:  Radiation Therapy Episodes       Active Episodes       Radiation Therapy: IMRT (2023)                   Radiation Treatments       Historical Treatments (Plans: 1)      Plan Last Treated On Elapsed Days Fractions Treated Prescribed Fraction Dose (cGy) Prescribed Total Dose (cGy)   L_Ear_Neck 2023 39 @ [id number] 30 of 30 200 6,000                Reference Point Last Treated On Elapsed Days Most Recent Session Dose (cGy) Total Dose (cGy)   L_Ear_Neck 2023 39 @ [id number] -- 6,000   L_Ear_Neck CP 2023 39 @ [id number] -- 6,178                            HISTORY OF PRESENT ILLNESS:   Subjective    83-year-old retired [employer] .  He lives in [facility].  He presented in April with a rapidly enlarging mass involving the skin of the pinna of the left ear.  This is in the setting of CLL.  He had a biopsy that demonstrated squamous cell carcinoma.  He was initially seen by plastic surgery who noted a 6 cm exophytic mass occupying approximately 30% of the pinna extending towards the  junction of the pinna side of head.  He was subsequently referred to ENT and Dr. Lino.  Underwent on 4/24/2023 excision of the left auricular mass as well as incisional biopsy of left cervical lymph node.  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  Resected postauricular mass demonstrated metastatic squamous cell carcinoma. involving a lymph node which also was involved with patient's known CLL.  Lymph node specimen was fragmented and therefore extranodal extension was indeterminate.     He is now 6 weeks postop.  He has recurrent ulcerative bleeding mass involving the left postauricular region.  He denies pain.  He does report bruising and bleeding.  He  currently has it bandaged.     He also 1 week ago started Zanubrutinib for CLL.    8/9/2023. Scheduled follow-up visit. Initial visit post completion of therapy. He offers no complaints. He is started systemic therapy for his CLL.       INTERVAL HISTORY:  5/9/2024.  Scheduled follow-up visit.  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  This was treated less than a year ago receiving 6000 cGy postoperatively.  He had a PET scan in November 2023 that showed recurrent disease in the left postauricular region.  There appears to be some delay in him, and him coming back to see me.  He does have some discomfort.  He also has some drainage.  He states that he had larval infestation in this area recently.  He is here to inquire about further therapy.    In addition he also has prostate cancer which is being followed by Dr. Luque.  Presently on androgen deprivation therapy for locally advanced disease though not appearing to be metastatic.  His current PSA on Lupron his 0.15.    CURRENT MEDICATIONS:  Current Outpatient Medications   Medication Sig Dispense Refill     "amoxicillin-clavulanate (AUGMENTIN) 875-125 MG Tab Take 1 Tablet by mouth every 12 hours for 7 days. 14 Tablet 0    nystatin (MYCOSTATIN) powder Apply 1 g topically 2 times a day for 4 days. Apply to groin 15 g 0    pregabalin (LYRICA) 50 MG capsule Take 50 mg by mouth 2 times a day.      Non Formulary Request Take 1 Tablet by mouth 1 time a day as needed (pain). \"Advil + Tylenol combo\" OTC      Leuprolide Acetate, 6 Month, (LUPRON DEPOT, 6-MONTH, IM) Inject  into the shoulder, thigh, or buttocks every 6 months. \"[facility] [place]\"       No current facility-administered medications for this encounter.       ALLERGIES:  Ciprofloxacin    PHYSICAL EXAM:   ECOG PERFORMANCE STATUS:      5/9/2024     2:24 PM   ECOG Performance Review   ECOG Performance Status Restricted in physically strenuous activity but ambulatory and able to carry out work of a light or sedentary nature, e.g., light house work, office work     BP (!) 152/72 (BP Location: Left arm, Patient Position: Sitting, BP Cuff Size: Adult)   Pulse 78   Temp 36.8 °C (98.2 °F) (Temporal)   Wt 68.2 kg (150 lb 5.7 oz)   SpO2 97%   BMI 28.39 kg/m²   Physical Exam  Vitals and nursing note reviewed.   Constitutional:       General: He is not in acute distress.     Appearance: He is well-developed.   HENT:      Head: Normocephalic.      Comments: 4 x 3 cm ulcerated lesion involving the left postauricular skin abutting the posterior auricle.  Small amount of yellow drainage noted from wound.  Lymphadenopathy:      Cervical: No cervical adenopathy.   Skin:     General: Skin is warm and dry.      Findings: No erythema.   Neurological:      Mental Status: He is alert and oriented to person, place, and time.   Psychiatric:         Behavior: Behavior normal.         Thought Content: Thought content normal.         Judgment: Judgment normal.             INTERNATIONAL PROSTATE SYMPTOM SCORE (I-PSS):      5/9/2024     1:17 PM   I-PSS Review   Incomplete Emptying- How often " have you had the sensation of not emptying your bladder? 0   Frequency- How often have you had to urinate less than every two hours? 0   Intermittency- How often have you found you stopped and started again several times when you urinated? 0   Urgency- How often have you found it difficult to postpone urination? 1   Weak Stream- How often have you had a weak urinary stream? 0   Straining- How often have you had to strain to start urination? 2   Nocturia- How many times did you typically get up at night to urinate? 2   Score: 5       QUALITY OF LIFE DUE TO URINARY SYMPTOMS:   If you were to spend the rest of your life with your urinary condition just the way it is now, how would you feel about that? 2  = Mostly satisfied    SEXUAL HEALTH INVENTORY FOR MEN (OZIEL):       5/9/2024     1:18 PM   OZIEL Total   How do you rate your confidence that you could get and keep an erection? 2   When you had erections with sexual stimulation, how often were your erections hard enough for penetration (entering your partner)? 0   During sexual intercourse, how often were you able to maintain you erection after you had penetrated (entered) your partner? 0   During Sexual intercourse, how difficult was it to maintain your erection to completion of intercourse? 0   When you attampted sexual intercourse, how often was it satisfactory for you? 0   Total: 2       LABORATORY DATA:   Lab Results   Component Value Date/Time    WBC 13.1 (H) 05/06/2024 12:25 AM    RBC 3.90 (L) 05/06/2024 12:25 AM    HEMOGLOBIN 11.3 (L) 05/06/2024 12:25 AM    HEMATOCRIT 34.3 (L) 05/06/2024 12:25 AM    MCV 87.9 05/06/2024 12:25 AM    MCH 29.0 05/06/2024 12:25 AM    MCHC 32.9 05/06/2024 12:25 AM    RDW 41.4 05/06/2024 12:25 AM    PLATELETCT 140 (L) 05/06/2024 12:25 AM    MPV 11.4 05/06/2024 12:25 AM    NEUTSPOLYS 31.90 (L) 05/03/2024 09:50 PM    LYMPHOCYTES 64.70 (H) 05/03/2024 09:50 PM    MONOCYTES 3.40 05/03/2024 09:50 PM    EOSINOPHILS 0.00 05/03/2024 09:50 PM     BASOPHILS 0.00 05/03/2024 09:50 PM    HYPOCHROMIA 1+ 04/24/2023 11:17 AM    ANISOCYTOSIS 2+ (A) 04/24/2023 11:17 AM      Lab Results   Component Value Date/Time    SODIUM 135 05/06/2024 12:25 AM    POTASSIUM 3.9 05/06/2024 12:25 AM    CHLORIDE 101 05/06/2024 12:25 AM    CO2 20 05/06/2024 12:25 AM    GLUCOSE 115 (H) 05/06/2024 12:25 AM    BUN 21 05/06/2024 12:25 AM    CREATININE 1.19 05/06/2024 12:25 AM       4/2024 PSA  0.15    RADIOLOGY DATA:  No results found.    IMPRESSION:    A 84 y.o. with   SCC (squamous cell carcinoma), ear, left  Staging form: Cutaneous Carcinoma of the Head and Neck, AJCC 8th Edition.  - Pathologic stage from 6/6/2023: Stage III (pT3, pN1, cM0) - Signed by Lupe PATEL M.D. 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  He developed recurrence approximately 2 months post completion of therapy and is now approximately 4 x 3 cm ulcerated mass with drainage.    Advised against additional radiotherapy at this point since it was ineffective initially.  Immune therapy was discussed but patient is reluctant to do immune therapy because of the distance that he lives from the hospital and does not want to come every 3 weeks for systemic therapy.    Alternatively, I did suggest consideration of surgical resection which may require some sort of plastics reconstruction.  He seemed amenable to this idea as it would be a one-time procedure.  Have placed a referral to [facility] as I do not know any  of the plastics people there.  Have also gotten nurse navigation involved to help coordinate the referral and get patient to the right doctor if my referral is incorrect.    Will see patient back in follow-up in about 2 months hopefully postoperatively for reassessment.    Will plan on addressing the prostate cancer when I see him back in 2 months.      Thank you for the opportunity to participate in his care.  If any questions or comments, please do not hesitate in calling.    Orders Placed This Encounter    Referral to ENT    REFERRAL TO ONCOLOGY NURSE NAVIGATOR    Referral to Oncology

## 2024-05-09 NOTE — PROGRESS NOTES
"Patient was seen today in clinic with Dr. Tay for follow up.  Vitals signs and weight were obtained and pain assessment was completed.  Allergies and medications were reviewed with the patient.  Toxicities of treatment assessed.     Vitals/Pain:  There were no vitals filed for this visit.         Allergies:   Ciprofloxacin    Current Medications:  Current Outpatient Medications   Medication Sig Dispense Refill    amoxicillin-clavulanate (AUGMENTIN) 875-125 MG Tab Take 1 Tablet by mouth every 12 hours for 7 days. 14 Tablet 0    nystatin (MYCOSTATIN) powder Apply 1 g topically 2 times a day for 4 days. Apply to groin 15 g 0    pregabalin (LYRICA) 50 MG capsule Take 50 mg by mouth 2 times a day.      Non Formulary Request Take 1 Tablet by mouth 1 time a day as needed (pain). \"Advil + Tylenol combo\" OTC      Leuprolide Acetate, 6 Month, (LUPRON DEPOT, 6-MONTH, IM) Inject  into the shoulder, thigh, or buttocks every 6 months. \"[facility] [place]\"       No current facility-administered medications for this encounter.           Emili Dale, Med Ass't    "

## 2024-05-10 ENCOUNTER — NON-PROVIDER VISIT (OUTPATIENT)
Dept: WOUND CARE | Facility: MEDICAL CENTER | Age: 84
End: 2024-05-10
Attending: HOSPITALIST
Payer: MEDICARE

## 2024-05-10 VITALS
HEART RATE: 74 BPM | DIASTOLIC BLOOD PRESSURE: 70 MMHG | RESPIRATION RATE: 17 BRPM | OXYGEN SATURATION: 96 % | TEMPERATURE: 96.7 F | SYSTOLIC BLOOD PRESSURE: 102 MMHG

## 2024-05-10 DIAGNOSIS — C44.229 SCC (SQUAMOUS CELL CARCINOMA), EAR, LEFT: ICD-10-CM

## 2024-05-10 DIAGNOSIS — C91.10 CLL (CHRONIC LYMPHOCYTIC LEUKEMIA) (HCC): ICD-10-CM

## 2024-05-10 DIAGNOSIS — C61 PROSTATE CANCER (HCC): ICD-10-CM

## 2024-05-10 DIAGNOSIS — K43.9 VENTRAL HERNIA WITHOUT OBSTRUCTION OR GANGRENE: ICD-10-CM

## 2024-05-10 DIAGNOSIS — S31.109D OPEN WOUND OF ABDOMEN, SUBSEQUENT ENCOUNTER: ICD-10-CM

## 2024-05-10 PROCEDURE — 99214 OFFICE O/P EST MOD 30 MIN: CPT | Performed by: NURSE PRACTITIONER

## 2024-05-10 ASSESSMENT — ENCOUNTER SYMPTOMS
BACK PAIN: 1
SHORTNESS OF BREATH: 0
DIARRHEA: 0
FEVER: 0
CHILLS: 0
NAUSEA: 0
CONSTIPATION: 0
VOMITING: 0
COUGH: 0

## 2024-05-10 NOTE — PATIENT INSTRUCTIONS
-Keep your wound dressing clean, dry, and intact. Only change dressing if it's over saturated, soiled or falls off.     -Change your dressing if it becomes soiled, soaked, or falls off.    -Should you experience any significant changes in your wound(s), such as infection (redness, swelling, localized heat, increased pain, fever > 101 F, chills) or have any questions regarding your home care instructions, please contact the wound center at [phone]. If after hours, contact your primary care physician or go to the hospital emergency room.

## 2024-05-10 NOTE — Clinical Note
"Non Provider Encounter- Full Thickness wound    HISTORY OF PRESENT ILLNESS  Wound History:    START OF CARE IN CLINIC: ***    REFERRING PROVIDER: ***   WOUND- Full Thickness Wound   LOCATION: ***   HISTORY: Patient states that the site initially opened up back in November, patient has been cleaning the site with hydrogen peroxide and applying bandages. Patient states the site is     Pertinent Labs and Diagnostics:    Labs:     A1c: No results found for: \"HBA1C\"       IMAGING: ***    VASCULAR STUDIES: ***    LAST  WOUND CULTURE:  DATE : ***           Patient allergies and medications reviewed via Epic.     Wound Assessment:      Pre-debridement Photo    Procedures:    -2% viscous lidocaine applied topically to wound bed for approximately 5 minutes prior to debridement  -Curette used to debride wound bed and periwound callus. Entire surface of wound, ***cm2 debrided.  Periwound callus, ~ ***cm2, debrided to skin level  -Refer to flowsheet for wound care details.       Post-debridement Photo      PATIENT EDUCATION  -Advised to go to ER for any increased redness, swelling, drainage or odor, or if patient develops fever, chills, nausea or vomiting.  -Importance of adequate nutrition for wound healing  -Increase protein intake (unless contraindicated by renal status)   "

## 2024-05-10 NOTE — PROGRESS NOTES
Advanced Wound Care   [facility]   [address]   [address]   [place] [address]   [phone] Fax: [phone]        Duration of Supply Order: 90 Days  Dispense as written.    Wound 04/24/23 Incision Ear Left (Active)   Wound Image    05/10/24 0800   Site Assessment Red;Pink;Yellow;Painful 05/10/24 0800   Periwound Assessment Edema;Scar tissue;Painful 05/10/24 0800   Margins Epibole (rolled edges) 05/10/24 0800   Drainage Amount Moderate 05/10/24 0800   Drainage Description Serosanguineous 05/10/24 0800   Treatments Cleansed;Site care 05/10/24 0800   Wound Cleansing Hypochlorus Acid 05/10/24 0800   Periwound Protectant Skin Protectant Wipes to Periwound 05/10/24 0800   Dressing Changed New 05/10/24 0800   Dressing Cleansing/Solutions Not Applicable 05/10/24 0800   Dressing Options Hydrofiber Silver Strip;Hydrofiber Silver;Silicone Adhesive Foam;Spandage 05/10/24 0800   Dressing Change/Treatment Frequency Every 48 hrs, and As Needed 05/10/24 0800   Wound Team Following Monthly 05/10/24 0800   Wound Length (cm) 4.5 cm 05/10/24 0800   Wound Width (cm) 3 cm 05/10/24 0800   Wound Depth (cm) 0.6 cm 05/10/24 0800   Wound Surface Area (cm^2) 13.5 cm^2 05/10/24 0800   Wound Volume (cm^3) 8.1 cm^3 05/10/24 0800   Tunneling (cm) 0 cm 05/10/24 0800   Undermining (cm) 2.2 cm 05/10/24 0800   Undermining of Wound, 1st Location From 8 o'clock;To 9 o'clock 05/10/24 0800   Wound Odor None 05/10/24 0800   Exposed Structures JOSE R 05/10/24 0800       Wound 05/03/24 Other (comment)  Abdomen Lower Right (Active)   Wound Image   05/10/24 0800   Site Assessment Red;Yellow;Pink 05/10/24 0800   Periwound Assessment Dry;Edema;Ecchymosis 05/10/24 0800   Margins Attached edges 05/10/24 0800   Drainage Amount Moderate 05/10/24 0800   Drainage Description Serosanguineous 05/10/24 0800   Treatments Cleansed;Site care 05/10/24 0800   Wound Cleansing Hypochlorus Acid 05/10/24 0800   Periwound Protectant Skin  Protectant Wipes to Periwound;Barrier Paste 05/10/24 0800   Dressing Changed New 05/10/24 0800   Dressing Cleansing/Solutions Not Applicable 05/10/24 0800   Dressing Options Hydrofiber Silver;Silicone Adhesive Foam 05/10/24 0800   Dressing Change/Treatment Frequency Every 72 hrs, and As Needed 05/10/24 0800   Wound Team Following Monthly 05/10/24 0800   Non-staged Wound Description Full thickness 05/10/24 0800   Wound Length (cm) 0.5 cm 05/10/24 0800   Wound Width (cm) 0.8 cm 05/10/24 0800   Wound Depth (cm) 0.1 cm 05/10/24 0800   Wound Surface Area (cm^2) 0.4 cm^2 05/10/24 0800   Wound Volume (cm^3) 0.04 cm^3 05/10/24 0800   Wound Healing % 90 05/10/24 0800   Tunneling (cm) 0 cm 05/10/24 0800   Undermining (cm) 0 cm 05/10/24 0800   Wound Odor None 05/10/24 0800   Exposed Structures None 05/10/24 0800        PROCEDURE:   Wounds cleansed with puracyn spray and gauze to remove non-viable tissue from the wound bed and periwound areas. Refer to flow sheet for wound care details. Patient tolerated the procedure well.      I agree with current plan of care.

## 2024-05-10 NOTE — PROGRESS NOTES
Provider Encounter- Full Thickness wound    HISTORY OF PRESENT ILLNESS  Wound History:    START OF CARE IN CLINIC: 5/10/2024    REFERRING PROVIDER:   John Lundberg M.D.       WOUND- Full Thickness Wound   LOCATION: Left postauricular region and ear                                  Right lower abdomen   HISTORY: 84-year-old male referred to [facility] for management of malignant lesion to left postauricular region and ear.  He initially presented in April of 2023 with a rapidly enlarging mass involving the skin of the pinna of the left ear.  This is in the setting of CLL.  He had a biopsy that demonstrated squamous cell carcinoma.  He was initially seen by plastic surgery who noted a 6 cm exophytic mass occupying approximately 30% of the pinna extending towards the junction of the pinna side of head.  He was subsequently referred to ENT and Dr. Lino.  Underwent on 4/24/2023 excision of the left auricular mass as well as incisional biopsy of left cervical lymph node.  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  Resected postauricular mass demonstrated metastatic squamous cell carcinoma. involving a lymph node which also was involved with patient's known CLL.  Lymph node specimen was fragmented and therefore extranodal extension was indeterminate.   Postoperatively he underwent radiation therapy beginning on 6/19/2023, ending on 7/28/2023, receiving a total of 6000 cGy.  Per patient, the lesion was almost completely healed for a few months and then started enlarging again.  PET scan in November 2023 showed recurrent disease in the left postauricular region.    Patient tried taking care of the wound himself presented to the ED on 5/3/2024 because he was hearing a buzzing sound in his left ear, found to have maggots in his wound.  He was also found to have a small open wound to his right lower  quadrant that was heavily draining..  Wound cultures were positive for MSSA and Proteus, sensitive to Augmentin.  He was discharged on 5/6 on p.o. Augmentin, advised to follow-up with ENT and radiation oncologist.  He was also referred to outpatient wound clinic for further management of his wound.   He was seen by Dr. Millard, radiation oncology, on 5/9/2024.  Advised against any further radiotherapy.  Immuno therapy was discussed, patient refused, does not want to travel from his home in [place] to [place] every 3 weeks for therapy.  Alternatively, Dr. Millard suggested referral to [facility] ENT for consideration of additional surgical resection, and possible plastic surgery.  Patient agreeable, referral placed.        Pertinent Medical History: CLL, SCC, prostate cancer, right inguinal hernia    TOBACCO USE: He has never smoked use smokeless tobacco    Patient's problem list, allergies, and current medications reviewed and updated in Epic    Interval History:  5/10/2024 Initial clinic visit with LIZZ Lassiter, TRUE-BC, LUANAN, CFCN.   Patient was scheduled to be seen by nursing today.  I had not moved onto my schedule due to complexity of his issues.  Patient states that overall he is feeling well, though is having some pain from his wound.  He plans to return to his home in [place] later today, has been staying with friends in [place] for the past several months.  He does not wish to come into the clinic every week, though is agreeable to monthly visits.   Because this is a malignancy, healing of this wound is not a realistic goal from wound care standpoint.  Informed patient that we could offer management of drainage and odor, but that definitive cure of this wound would be dependent on further surgery and possible chemo or immunotherapy.  He verbalizes understanding.      REVIEW OF SYSTEMS:   Review of Systems   Constitutional:  Negative for chills and fever.   Respiratory:  Negative for cough and shortness of  breath.    Cardiovascular:  Negative for chest pain.   Gastrointestinal:  Negative for constipation, diarrhea, nausea and vomiting.        Large hernia to right lower quadrant, nontender   Genitourinary:  Negative for dysuria.   Musculoskeletal:  Positive for back pain.        Chronic low back pain for several years       PHYSICAL EXAMINATION:   /70   Pulse 74   Temp 35.9 °C (96.7 °F) (Temporal)   Resp 17   SpO2 96%     Physical Exam  Constitutional:       Comments: Appears to be malnourished   HENT:      Head:      Comments: Distortion of left superior pinna, appears to be surgical.    Hard of hearing  Cardiovascular:      Rate and Rhythm: Normal rate.   Pulmonary:      Effort: Pulmonary effort is normal.   Abdominal:      Palpations: Abdomen is soft.      Tenderness: There is no abdominal tenderness.      Hernia: A hernia is present.      Comments: Large protuberance of right lower quadrant, obvious hernia, nontender   Skin:     Comments: Left postauricular ulceration, extending into canal-slough to entire wound bed, moderate yellow-tan drainage, no odor.  Redness to periwound skin, history of radiation therapy    Right lower abdomen-small full-thickness wound, slough to wound bed, minimal drainage, periwound with ecchymosis and excoriation.   Neurological:      Mental Status: He is alert and oriented to person, place, and time.   Psychiatric:         Mood and Affect: Mood normal.         WOUND ASSESSMENT  Wound 04/24/23 Incision Ear Left (Active)   Wound Image    05/10/24 0800   Site Assessment Red;Pink;Yellow;Painful 05/10/24 0800   Periwound Assessment Edema;Scar tissue;Painful 05/10/24 0800   Margins Epibole (rolled edges) 05/10/24 0800   Drainage Amount Moderate 05/10/24 0800   Drainage Description Serosanguineous 05/10/24 0800   Treatments Cleansed;Site care 05/10/24 0800   Wound Cleansing Hypochlorus Acid 05/10/24 0800   Periwound Protectant Skin Protectant Wipes to Periwound 05/10/24 0800    Dressing Changed New 05/10/24 0800   Dressing Cleansing/Solutions Not Applicable 05/10/24 0800   Dressing Options Hydrofiber Silver Strip;Hydrofiber Silver;Silicone Adhesive Foam;Spandage 05/10/24 0800   Dressing Change/Treatment Frequency Every 48 hrs, and As Needed 05/10/24 0800   Wound Team Following Monthly 05/10/24 0800   Wound Length (cm) 4.5 cm 05/10/24 0800   Wound Width (cm) 3 cm 05/10/24 0800   Wound Depth (cm) 0.6 cm 05/10/24 0800   Wound Surface Area (cm^2) 13.5 cm^2 05/10/24 0800   Wound Volume (cm^3) 8.1 cm^3 05/10/24 0800   Tunneling (cm) 0 cm 05/10/24 0800   Undermining (cm) 2.2 cm 05/10/24 0800   Undermining of Wound, 1st Location From 8 o'clock;To 9 o'clock 05/10/24 0800   Wound Odor None 05/10/24 0800   Exposed Structures JOSE R 05/10/24 0800   Number of days: 382       Wound 05/03/24 Other (comment)  Abdomen Lower Right (Active)   Wound Image   05/10/24 0800   Site Assessment Red;Yellow;Pink 05/10/24 0800   Periwound Assessment Dry;Edema;Ecchymosis 05/10/24 0800   Margins Attached edges 05/10/24 0800   Drainage Amount Moderate 05/10/24 0800   Drainage Description Serosanguineous 05/10/24 0800   Treatments Cleansed;Site care 05/10/24 0800   Wound Cleansing Hypochlorus Acid 05/10/24 0800   Periwound Protectant Skin Protectant Wipes to Periwound;Barrier Paste 05/10/24 0800   Dressing Changed New 05/10/24 0800   Dressing Cleansing/Solutions Not Applicable 05/10/24 0800   Dressing Options Hydrofiber Silver;Silicone Adhesive Foam 05/10/24 0800   Dressing Change/Treatment Frequency Every 72 hrs, and As Needed 05/10/24 0800   Wound Team Following Monthly 05/10/24 0800   Non-staged Wound Description Full thickness 05/10/24 0800   Wound Length (cm) 0.5 cm 05/10/24 0800   Wound Width (cm) 0.8 cm 05/10/24 0800   Wound Depth (cm) 0.1 cm 05/10/24 0800   Wound Surface Area (cm^2) 0.4 cm^2 05/10/24 0800   Wound Volume (cm^3) 0.04 cm^3 05/10/24 0800   Wound Healing % 90 05/10/24 0800   Tunneling (cm) 0 cm 05/10/24  "0800   Undermining (cm) 0 cm 05/10/24 0800   Wound Odor None 05/10/24 0800   Exposed Structures None 05/10/24 0800   Number of days: 7       PROCEDURE:   -2% viscous lidocaine applied topically to wound beds for approximately 5 minutes prior to debridement  -No excisional debridement of either wound.  Postauricular wound is known malignancy.  Wound to abdomen possible metastases.  -Wound care completed by wound RN, refer to flowsheet  -Patient tolerated the procedure well, without c/o pain or discomfort.       Pertinent Labs and Diagnostics:    Labs:     A1c: No results found for: \"HBA1C\"       IMAGING: Outside imaging available in epic    VASCULAR STUDIES: N/A    LAST  WOUND CULTURE:  DATE :   Lab Results   Component Value Date/Time    CULTRSULT Light growth usual skin simon. (A) 05/03/2024 10:20 PM    CULTRSULT Staphylococcus aureus  Light growth   (A) 05/03/2024 10:20 PM    CULTRSULT Proteus vulgaris group  Light growth   (A) 05/03/2024 10:20 PM    CULTRSULT Light growth usual skin simon. (A) 05/03/2024 10:20 PM    CULTRSULT (A) 05/03/2024 10:20 PM     Staphylococcus aureus  Light growth  Methicillin sensitive by screening method  See previous culture for sensitivity report.      CULTRSULT (A) 05/03/2024 10:20 PM     Proteus vulgaris group  Light growth  See previous culture for sensitivity report.           ASSESSMENT AND PLAN:     1. SCC (squamous cell carcinoma), ear, left    5/10/2024: Malignant lesion to postauricular region extending into the ear canal.  -Patient seen by radiation oncologist yesterday, immunotherapy was recommended, patient declined.  -Patient has been referred to ENT at [facility] for consideration of further surgery, and possible plastics intervention.  -Goal of therapy in wound clinic will will be to manage drainage and odor.  Wound will likely deteriorate without further treatment of cancer.  -Patient shown how to apply dressing in clinic today.  Advised to change dressing every 1 to 2 " days or as needed for drainage.  Supplies will be sent to his home  -Patient to return to clinic in 1 month.  He lives in [place], and is not able to come into [place] more often.    Wound care: Silver strip, silver Hydrofiber, silicone adhesive foam, Spandage to secure    2. Open wound of abdomen, subsequent encounter    5/10/2024: Small full-thickness wound to right lower abdomen, over large hernia.  Etiology unclear, possible metastases of SCC  -No excisional debridement of this wound in clinic today due to suspicion for SCC  -Patient to return to clinic in 1 month, see above    Wound care: Silver Hydrofiber, silicone foam dressing    3. CLL (chronic lymphocytic leukemia) (HCC)  4.  Prostate cancer    5/10/2024: Multiple cancers, complicating factor  -Prostate cancer being managed by Dr. Luque, patient is on androgen deprivation therapy  -Status of CLL unclear    5. Ventral hernia without obstruction or gangrene    5/10/2024: Patient is a very large abdominal hernia, denies any  discomfort.  -Signs and symptoms of obstruction we discussed in clinic today.          PATIENT EDUCATION  - Importance of adequate nutrition for wound healing  -Advised to go to ER for any increased redness, swelling, drainage, or odor, or if patient develops fever, chills, nausea or vomiting.     My total time spent caring for the patient on the day of the encounter was 30 minutes.   This does not include time spent on separately billable procedures/tests.       Please note that this note may have been created using voice recognition software. I have worked with technical experts from [facility] to optimize the interface.  I have made every reasonable attempt to correct obvious errors, but there may be errors of grammar and possibly content that I did not discover before finalizing the note.    N

## 2024-05-14 ENCOUNTER — PATIENT OUTREACH (OUTPATIENT)
Dept: ONCOLOGY | Facility: MEDICAL CENTER | Age: 84
End: 2024-05-14
Payer: MEDICARE

## 2024-05-15 NOTE — PROGRESS NOTES
Oncology nurse navigator called the patient to follow up on the referral for the patient to be seen at [facility].  ONN introduced self and my role and the purpose of the call.  Patient stated that he lives in [place] and he depends on others for his rides.  ONN inquired to if he had someone to go with him to [facility] as a care giver for support.  Patient stated that he did not and that he has friends that could drop him off at [facility] and pick him up if he was a surgical candidate.  ONN expressed concerns that the patient may not have the resources he would need if surgery occurred and especially when he returned home to [place].  ONN expressed concern over no home health services.  Patient verbalized understanding and stated that he is currently doing his own dressing changes.    ONN also discussed the lodging maia and transportation maia would be available to him if he chose or needed immunotherapy.  Patient stated that he would rather stick with the one and done option if that is available to him vs coming into town every 3 weeks for treatment.  ONN provided contact information.  ONN called [facility] and currently they are still working the fax que for the last four days and have not found the referral.  [facility] advised that I call back tomorrow.

## 2024-05-16 ENCOUNTER — APPOINTMENT (OUTPATIENT)
Dept: WOUND CARE | Facility: MEDICAL CENTER | Age: 84
End: 2024-05-16
Attending: HOSPITALIST
Payer: MEDICARE

## 2024-05-17 ENCOUNTER — PATIENT OUTREACH (OUTPATIENT)
Dept: ONCOLOGY | Facility: MEDICAL CENTER | Age: 84
End: 2024-05-17
Payer: MEDICARE

## 2024-05-17 NOTE — PROGRESS NOTES
Oncology nurse navigator called [facility] referral center and patient's referral was not received as of yet.  ONN explained that it was probably faxed May 9-10.  [facility] stated did not receive anything for the patient year to date.  ONN printed referral and sent notes and records over to [phone] and confirmation received.  ONN refax and a 2nd confirmation received.  ONN called fim room to have recent images sent to [facility].

## 2024-05-17 NOTE — PROGRESS NOTES
Oncology nurse navigator called the [facility] in [place] requesting films to be sent to the [facility].  ONDIRK left a voice mail requesting a call back.

## 2024-05-21 ENCOUNTER — PATIENT OUTREACH (OUTPATIENT)
Dept: ONCOLOGY | Facility: MEDICAL CENTER | Age: 84
End: 2024-05-21
Payer: MEDICARE

## 2024-05-22 NOTE — PROGRESS NOTES
Oncology nurse navigator called [facility] to follow up on a referral.  Patient's referral was received and is in the system care coordinator is working on getting the patient worked up and scheduled.

## 2024-05-23 ENCOUNTER — PATIENT OUTREACH (OUTPATIENT)
Dept: ONCOLOGY | Facility: MEDICAL CENTER | Age: 84
End: 2024-05-23
Payer: MEDICARE

## 2024-05-23 NOTE — PROGRESS NOTES
Oncology nurse navigator called [facility] and the referral was sent over to the ENT department and is in the process of being scheduled.

## 2024-05-29 ENCOUNTER — PATIENT OUTREACH (OUTPATIENT)
Dept: ONCOLOGY | Facility: MEDICAL CENTER | Age: 84
End: 2024-05-29
Payer: MEDICARE

## 2024-05-29 NOTE — PROGRESS NOTES
Oncology nurse navigator called [facility] ENT clinic to follow up on the patient's referral.  SONYA spoke to Shruthi and she stated that the doctor has reviewed the patient's records and he is ready to be scheduled.  Shruthi stated that she would be reaching out to the patient.

## 2024-05-31 ENCOUNTER — PATIENT OUTREACH (OUTPATIENT)
Dept: ONCOLOGY | Facility: MEDICAL CENTER | Age: 84
End: 2024-05-31
Payer: MEDICARE

## 2024-05-31 NOTE — PROGRESS NOTES
Oncology nurse navigator called patient to follow up and he stated that he has received a call from [facility] and will be following up with them today.

## 2024-06-03 ENCOUNTER — PATIENT OUTREACH (OUTPATIENT)
Dept: ONCOLOGY | Facility: MEDICAL CENTER | Age: 84
End: 2024-06-03
Payer: MEDICARE

## 2024-06-03 NOTE — PROGRESS NOTES
Oncology nurse navigator called patient and he has an appointment with [facility] on June 12th.  Patient also has an appointment with the wound team in the morning.

## 2024-06-04 ENCOUNTER — OFFICE VISIT (OUTPATIENT)
Dept: WOUND CARE | Facility: MEDICAL CENTER | Age: 84
End: 2024-06-04
Attending: HOSPITALIST
Payer: MEDICARE

## 2024-06-04 VITALS
SYSTOLIC BLOOD PRESSURE: 145 MMHG | RESPIRATION RATE: 18 BRPM | HEART RATE: 89 BPM | DIASTOLIC BLOOD PRESSURE: 56 MMHG | TEMPERATURE: 97.2 F | OXYGEN SATURATION: 95 %

## 2024-06-04 DIAGNOSIS — C61 PROSTATE CANCER (HCC): ICD-10-CM

## 2024-06-04 DIAGNOSIS — K43.9 VENTRAL HERNIA WITHOUT OBSTRUCTION OR GANGRENE: ICD-10-CM

## 2024-06-04 DIAGNOSIS — C91.10 CLL (CHRONIC LYMPHOCYTIC LEUKEMIA) (HCC): ICD-10-CM

## 2024-06-04 DIAGNOSIS — S31.109D OPEN WOUND OF ABDOMEN, SUBSEQUENT ENCOUNTER: ICD-10-CM

## 2024-06-04 DIAGNOSIS — C44.229 SCC (SQUAMOUS CELL CARCINOMA), EAR, LEFT: ICD-10-CM

## 2024-06-04 PROCEDURE — 3078F DIAST BP <80 MM HG: CPT | Performed by: NURSE PRACTITIONER

## 2024-06-04 PROCEDURE — 99213 OFFICE O/P EST LOW 20 MIN: CPT

## 2024-06-04 PROCEDURE — 99213 OFFICE O/P EST LOW 20 MIN: CPT | Performed by: NURSE PRACTITIONER

## 2024-06-04 PROCEDURE — 3077F SYST BP >= 140 MM HG: CPT | Performed by: NURSE PRACTITIONER

## 2024-06-04 NOTE — PROGRESS NOTES
Provider Encounter- Full Thickness wound    HISTORY OF PRESENT ILLNESS  Wound History:    START OF CARE IN CLINIC: 5/10/2024    REFERRING PROVIDER:   John Lundberg M.D.       WOUND- Full Thickness Wound   LOCATION: Left postauricular region and ear                                  Right lower abdomen   HISTORY: 84-year-old male referred to [facility] for management of malignant lesion to left postauricular region and ear.  He initially presented in April of 2023 with a rapidly enlarging mass involving the skin of the pinna of the left ear.  This is in the setting of CLL.  He had a biopsy that demonstrated squamous cell carcinoma.  He was initially seen by plastic surgery who noted a 6 cm exophytic mass occupying approximately 30% of the pinna extending towards the junction of the pinna side of head.  He was subsequently referred to ENT and Dr. Lino.  Underwent on 4/24/2023 excision of the left auricular mass as well as incisional biopsy of left cervical lymph node.  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  Resected postauricular mass demonstrated metastatic squamous cell carcinoma. involving a lymph node which also was involved with patient's known CLL.  Lymph node specimen was fragmented and therefore extranodal extension was indeterminate.   Postoperatively he underwent radiation therapy beginning on 6/19/2023, ending on 7/28/2023, receiving a total of 6000 cGy.  Per patient, the lesion was almost completely healed for a few months and then started enlarging again.  PET scan in November 2023 showed recurrent disease in the left postauricular region.    Patient tried taking care of the wound himself presented to the ED on 5/3/2024 because he was hearing a buzzing sound in his left ear, found to have maggots in his wound.  He was also found to have a small open wound to his right lower  quadrant that was heavily draining..  Wound cultures were positive for MSSA and Proteus, sensitive to Augmentin.  He was discharged on 5/6 on p.o. Augmentin, advised to follow-up with ENT and radiation oncologist.  He was also referred to outpatient wound clinic for further management of his wound.   He was seen by Dr. Millard, radiation oncology, on 5/9/2024.  Advised against any further radiotherapy.  Immuno therapy was discussed, patient refused, does not want to travel from his home in [place] to [place] every 3 weeks for therapy.  Alternatively, Dr. Millard suggested referral to [facility]NT for consideration of additional surgical resection, and possible plastic surgery.  Patient agreeable, referral placed.        Pertinent Medical History: CLL, SCC, prostate cancer, right inguinal hernia    TOBACCO USE: He has never smoked use smokeless tobacco    Patient's problem list, allergies, and current medications reviewed and updated in Epic    Interval History:  5/10/2024 Initial clinic visit with LIZZ Lassiter, SANTO, MEGAN, FATIMAH.   Patient was scheduled to be seen by nursing today.  I had not moved onto my schedule due to complexity of his issues.  Patient states that overall he is feeling well, though is having some pain from his wound.  He plans to return to his home in [place] later today, has been staying with friends in [place] for the past several months.  He does not wish to come into the clinic every week, though is agreeable to monthly visits.   Because this is a malignancy, healing of this wound is not a realistic goal from wound care standpoint.  Informed patient that we could offer management of drainage and odor, but that definitive cure of this wound would be dependent on further surgery and possible chemo or immunotherapy.  He verbalizes understanding.    6/4/2024 : Clinic visit with LIZZ Lassiter, SANTO, MEGAN, FATIMAH.   Patient states he feels okay.  He reports a lot of drainage from his  wounds, has been changing dressing 1-2 times per day.  He has an appointment at [facility] next week.      REVIEW OF SYSTEMS:   Unchanged from previous clinic visit on 5/10/2024    PHYSICAL EXAMINATION:   BP (!) 145/56 Comment: RN notified  Pulse 89   Temp 36.2 °C (97.2 °F) (Temporal)   Resp 18   SpO2 95%     Physical Exam  Constitutional:       Comments: Appears to be malnourished   HENT:      Head:      Comments: Distortion of left superior pinna, appears to be surgical.    Hard of hearing  Cardiovascular:      Rate and Rhythm: Normal rate.   Pulmonary:      Effort: Pulmonary effort is normal.   Abdominal:      Palpations: Abdomen is soft.      Tenderness: There is no abdominal tenderness.      Hernia: A hernia is present.      Comments: Large protuberance of right lower quadrant, obvious hernia, nontender   Skin:     Comments: Left postauricular ulceration, extending into canal-slough to entire wound bed, bleeds easily, moderate yellow-tan drainage, no odor.  Chronic redness to periwound skin, history of radiation therapy    Right lower abdomen-small full-thickness wound.  Wound area has increased.  Thin layer of slough to wound bed, minimal drainage, periwound with ecchymosis and excoriation.   Neurological:      Mental Status: He is alert and oriented to person, place, and time.   Psychiatric:         Mood and Affect: Mood normal.         WOUND ASSESSMENT  Wound 04/24/23 Incision Ear Left (Active)   Wound Image   06/04/24 1400   Site Assessment Yellow;White;Red;Drainage;Slough;Painful 06/04/24 1400   Periwound Assessment Scar tissue 06/04/24 1400   Margins Epibole (rolled edges) 06/04/24 1400   Drainage Amount Large 06/04/24 1400   Drainage Description Serosanguineous;Yellow 06/04/24 1400   Treatments Cleansed;Site care 06/04/24 1400   Wound Cleansing Hypochlorus Acid 06/04/24 1400   Periwound Protectant Skin Protectant Wipes to Periwound;Barrier Paste 06/04/24 1400   Dressing Changed Changed 06/04/24 1400    Dressing Cleansing/Solutions Not Applicable 06/04/24 1400   Dressing Options Hydrofiber Silver;Nonadhesive Foam;Hypafix Tape;Spandage 06/04/24 1400   Dressing Change/Treatment Frequency Every 48 hrs, and As Needed 06/04/24 1400   Wound Team Following Monthly 06/04/24 1400   Wound Length (cm) 5.8 cm 06/04/24 1400   Wound Width (cm) 3.9 cm 06/04/24 1400   Wound Depth (cm) 0.6 cm 05/10/24 0800   Wound Surface Area (cm^2) 22.62 cm^2 06/04/24 1400   Wound Volume (cm^3) 8.1 cm^3 05/10/24 0800   Tunneling (cm) 0 cm 05/10/24 0800   Undermining (cm) 2.2 cm 05/10/24 0800   Undermining of Wound, 1st Location From 8 o'clock;To 9 o'clock 05/10/24 0800   Wound Odor Mild 06/04/24 1400   Exposed Structures JOSE R 06/04/24 1400   Number of days: 407       Wound 05/03/24 Other (comment)  Abdomen Lower Right (Active)   Wound Image   06/04/24 1400   Site Assessment Red;Pink;Bleeding 06/04/24 1400   Periwound Assessment Dry;Scar tissue;Painful 06/04/24 1400   Margins Attached edges 06/04/24 1400   Drainage Amount Moderate 06/04/24 1400   Drainage Description Sanguineous 06/04/24 1400   Treatments Cleansed;Topical Lidocaine;Site care 06/04/24 1400   Wound Cleansing Hypochlorus Acid 06/04/24 1400   Periwound Protectant Skin Protectant Wipes to Periwound 06/04/24 1400   Dressing Changed Changed 06/04/24 1400   Dressing Cleansing/Solutions Not Applicable 06/04/24 1400   Dressing Options Hydrofiber Silver;Silicone Adhesive Foam 06/04/24 1400   Dressing Change/Treatment Frequency Every 72 hrs, and As Needed 06/04/24 1400   Wound Team Following Monthly 05/10/24 0800   Non-staged Wound Description Full thickness 06/04/24 1400   Wound Length (cm) 5.4 cm 06/04/24 1400   Wound Width (cm) 5.5 cm 06/04/24 1400   Wound Depth (cm) 0.2 cm 06/04/24 1400   Wound Surface Area (cm^2) 29.7 cm^2 06/04/24 1400   Wound Volume (cm^3) 5.94 cm^3 06/04/24 1400   Wound Healing % -1385 06/04/24 1400   Tunneling (cm) 0 cm 06/04/24 1400   Undermining (cm) 0 cm 06/04/24  "1400   Wound Odor None 06/04/24 1400   Exposed Structures None 06/04/24 1400   Number of days: 32       PROCEDURE:   -2% viscous lidocaine applied topically to wound beds for approximately 5 minutes prior to debridement  -No excisional debridement of either wound.  Postauricular wound is known malignancy.  Wound to abdomen possible metastases.  -Wound care completed by wound RN, refer to flowsheet  -Patient tolerated the procedure well, without c/o pain or discomfort.       Pertinent Labs and Diagnostics:    Labs:     A1c: No results found for: \"HBA1C\"       IMAGING: Outside imaging available in epic    VASCULAR STUDIES: N/A    LAST  WOUND CULTURE:  DATE :   Lab Results   Component Value Date/Time    CULTRSULT Light growth usual skin simon. (A) 05/03/2024 10:20 PM    CULTRSULT Staphylococcus aureus  Light growth   (A) 05/03/2024 10:20 PM    CULTRSULT Proteus vulgaris group  Light growth   (A) 05/03/2024 10:20 PM    CULTRSULT Light growth usual skin simon. (A) 05/03/2024 10:20 PM    CULTRSULT (A) 05/03/2024 10:20 PM     Staphylococcus aureus  Light growth  Methicillin sensitive by screening method  See previous culture for sensitivity report.      CULTRSULT (A) 05/03/2024 10:20 PM     Proteus vulgaris group  Light growth  See previous culture for sensitivity report.           ASSESSMENT AND PLAN:     1. SCC (squamous cell carcinoma), ear, left    6/4/2024: Malignant lesion extending from postauricular area into ear canal.  Size of wound has not changed significantly.  Tissue friable  -Patient has been seen by radiation oncologist , immunotherapy was recommended, patient declined.  -Patient has been referred to ENT at [facility] for consideration of further surgery, and possible plastics intervention.  He has an appointment next week.  -Goal of therapy in wound clinic will will be to manage drainage and odor.  Wound will likely deteriorate without further treatment of cancer.  -Dressing changed in clinic today.  Patient " has been shown how to apply dressing.  Advised to change dressing every 1 to 2 days or as needed for drainage.  Home supplies are being provided through [employer].  -Patient to return to clinic in 1 month.  He lives in [place], and is not able to come into [place] more often.    Wound care: Silver strip, silver Hydrofiber, silicone adhesive foam, Spandage to secure    2. Open wound of abdomen, subsequent encounter    6/4/2024: Small full-thickness wound to right lower abdomen, over large hernia.  Etiology unclear, possible metastases of SCC  -No excisional debridement of this wound in clinic today due to suspicion for SCC  -Patient to return to clinic in 3 to 4 weeks, see above    Wound care: Silver Hydrofiber, silicone foam dressing    3. CLL (chronic lymphocytic leukemia) (HCC)  4.  Prostate cancer    6/4/2024: Multiple cancers, complicating factor  -Prostate cancer being managed by Dr. Luque, patient is on androgen deprivation therapy  -Status of CLL unclear    5. Ventral hernia without obstruction or gangrene    6/4/2024: Patient is a very large abdominal hernia, denies any  discomfort.  -Signs and symptoms of obstruction we discussed in clinic today.          PATIENT EDUCATION  - Importance of adequate nutrition for wound healing  -Advised to go to ER for any increased redness, swelling, drainage, or odor, or if patient develops fever, chills, nausea or vomiting.     My total time spent caring for the patient on the day of the encounter was 20 minutes.   This does not include time spent on separately billable procedures/tests.       Please note that this note may have been created using voice recognition software. I have worked with technical experts from [employer] to optimize the interface.  I have made every reasonable attempt to correct obvious errors, but there may be errors of grammar and possibly content that I did not discover before finalizing the note.    N

## 2024-06-04 NOTE — PATIENT INSTRUCTIONS
-Keep your wound dressing clean, dry, and intact. Only change dressing if it's over saturated, soiled or falls off.     -Change your dressing if it becomes soiled, soaked, or falls off.    -Should you experience any significant changes in your wound(s), such as infection (redness, swelling, localized heat, increased pain, fever > 101 F, chills) or have any questions regarding your home care instructions, please contact the wound center at [phone]. If after hours, contact your primary care physician or go to the hospital emergency room.

## 2024-06-17 ENCOUNTER — PATIENT OUTREACH (OUTPATIENT)
Dept: ONCOLOGY | Facility: MEDICAL CENTER | Age: 84
End: 2024-06-17
Payer: MEDICARE

## 2024-06-17 NOTE — PROGRESS NOTES
Oncology nurse navigator called patient several times this morning but was unable to get thorough due to the patient's phone being busy.  SONYA called again and finally spoke to the patient regarding his appointment at [facility].  He stated that he is going to have surgery at [facility].  He stated that he was told he has a pet scan and a CT scan that needs to be completed and some other preop tests.  He states that [facility] sent over the orders.  He stated that he spoke to [facility] and he stated that [facility] had not received the orders yet.  Patient stated that he had a business card for a patient navigator at the [facility] ENT clinic and SONYA called and left a message with the navigator for a call back for assistance.  Patient stated that he has a f/u with wound care at [facility] on July 2nd and he would like to get all of his appointments arranged on the same day if possible.  He states that the [facility] where he lives has a van that will take residents to and from [place] in one day for a fee of $20.

## 2024-06-18 ENCOUNTER — PATIENT OUTREACH (OUTPATIENT)
Dept: ONCOLOGY | Facility: MEDICAL CENTER | Age: 84
End: 2024-06-18
Payer: MEDICARE

## 2024-06-18 NOTE — PROGRESS NOTES
Oncology nurse navigator [facility] imaging to confirm the receipt of new imaging orders for a CT scan from [facility].  Imaging department confirmed that they had yet to receive them.  ONN called the [facility] ENT clinic and they sent the orders over on June 13th to the [facility] Imaging fax ONN confirmed the number.  ONN requested that they sent the orders to ON at [phone].  ONN received the orders and sent them to the Imaging referral center and scanned the orders and the notes from [facility] to the patient's chart under the media tab.  ONN called the scheduling center to reach out to the patient to get scheduled the orders are marked urgent.

## 2024-07-02 ENCOUNTER — HOSPITAL ENCOUNTER (OUTPATIENT)
Dept: RADIOLOGY | Facility: MEDICAL CENTER | Age: 84
End: 2024-07-02
Attending: OTOLARYNGOLOGY
Payer: MEDICARE

## 2024-07-02 DIAGNOSIS — C44.229 SQUAMOUS CELL CARCINOMA OF SKIN OF LEFT EAR AND EXTERNAL AURICULAR CANAL: ICD-10-CM

## 2024-07-02 PROCEDURE — 70481 CT ORBIT/EAR/FOSSA W/DYE: CPT

## 2024-07-02 PROCEDURE — 70498 CT ANGIOGRAPHY NECK: CPT

## 2024-07-02 PROCEDURE — 700117 HCHG RX CONTRAST REV CODE 255: Performed by: OTOLARYNGOLOGY

## 2024-07-02 RX ADMIN — IOHEXOL 100 ML: 350 INJECTION, SOLUTION INTRAVENOUS at 13:24

## 2024-07-08 ENCOUNTER — PATIENT OUTREACH (OUTPATIENT)
Dept: ONCOLOGY | Facility: MEDICAL CENTER | Age: 84
End: 2024-07-08
Payer: MEDICARE

## 2024-09-17 ENCOUNTER — APPOINTMENT (OUTPATIENT)
Dept: RADIATION ONCOLOGY | Facility: MEDICAL CENTER | Age: 84
End: 2024-09-17
Attending: RADIOLOGY
Payer: MEDICARE

## 2025-01-01 ENCOUNTER — HOSPITAL ENCOUNTER (INPATIENT)
Facility: MEDICAL CENTER | Age: 85
LOS: 1 days | DRG: 871 | End: 2025-06-21
Attending: EMERGENCY MEDICINE | Admitting: STUDENT IN AN ORGANIZED HEALTH CARE EDUCATION/TRAINING PROGRAM
Payer: MEDICARE

## 2025-01-01 ENCOUNTER — APPOINTMENT (OUTPATIENT)
Dept: RADIOLOGY | Facility: MEDICAL CENTER | Age: 85
DRG: 871 | End: 2025-01-01
Attending: EMERGENCY MEDICINE
Payer: MEDICARE

## 2025-01-01 VITALS
HEART RATE: 110 BPM | HEIGHT: 61 IN | BODY MASS INDEX: 22.66 KG/M2 | SYSTOLIC BLOOD PRESSURE: 85 MMHG | WEIGHT: 120 LBS | OXYGEN SATURATION: 77 % | RESPIRATION RATE: 12 BRPM | TEMPERATURE: 97.4 F | DIASTOLIC BLOOD PRESSURE: 53 MMHG

## 2025-01-01 DIAGNOSIS — Z51.5 COMFORT MEASURES ONLY STATUS: ICD-10-CM

## 2025-01-01 DIAGNOSIS — K31.5: ICD-10-CM

## 2025-01-01 DIAGNOSIS — K92.2 UGIB (UPPER GASTROINTESTINAL BLEED): ICD-10-CM

## 2025-01-01 DIAGNOSIS — K56.609 SBO (SMALL BOWEL OBSTRUCTION) (HCC): Primary | ICD-10-CM

## 2025-01-01 LAB
A1 AG RBC QL: NORMAL
ABO + RH BLD: NORMAL
ABO GROUP BLD: ABNORMAL
ALBUMIN SERPL BCP-MCNC: 3 G/DL (ref 3.2–4.9)
ALBUMIN/GLOB SERPL: 1.4 G/DL
ALP SERPL-CCNC: 500 U/L (ref 30–99)
ALT SERPL-CCNC: 425 U/L (ref 2–50)
ANION GAP SERPL CALC-SCNC: 16 MMOL/L (ref 7–16)
ANISOCYTOSIS BLD QL SMEAR: ABNORMAL
AST SERPL-CCNC: 587 U/L (ref 12–45)
BASOPHILS # BLD AUTO: 0 % (ref 0–1.8)
BASOPHILS # BLD: 0 K/UL (ref 0–0.12)
BILIRUB SERPL-MCNC: 0.5 MG/DL (ref 0.1–1.5)
BLD GP AB INVEST PLASRBC-IMP: ABNORMAL
BLD GP AB INVEST PLASRBC-IMP: ABNORMAL
BLD GP AB SCN SERPL QL: ABNORMAL
BLD GP AB SCN SERPL QL: ABNORMAL
BUN SERPL-MCNC: 33 MG/DL (ref 8–22)
CALCIUM ALBUM COR SERPL-MCNC: 9.9 MG/DL (ref 8.5–10.5)
CALCIUM SERPL-MCNC: 9.1 MG/DL (ref 8.5–10.5)
CHLORIDE SERPL-SCNC: 104 MMOL/L (ref 96–112)
CO2 SERPL-SCNC: 18 MMOL/L (ref 20–33)
COMMENT NL1176: NORMAL
CREAT SERPL-MCNC: 0.88 MG/DL (ref 0.5–1.4)
EOSINOPHIL # BLD AUTO: 0 K/UL (ref 0–0.51)
EOSINOPHIL NFR BLD: 0 % (ref 0–6.9)
ERYTHROCYTE [DISTWIDTH] IN BLOOD BY AUTOMATED COUNT: 51 FL (ref 35.9–50)
GFR SERPLBLD CREATININE-BSD FMLA CKD-EPI: 85 ML/MIN/1.73 M 2
GLOBULIN SER CALC-MCNC: 2.2 G/DL (ref 1.9–3.5)
GLUCOSE SERPL-MCNC: 160 MG/DL (ref 65–99)
HCT VFR BLD AUTO: 34.8 % (ref 42–52)
HGB BLD-MCNC: 11.6 G/DL (ref 14–18)
HOLDING TUBE BB 8507: NORMAL
INR PPP: 1.38 (ref 0.87–1.13)
LACTATE SERPL-SCNC: 1.5 MMOL/L (ref 0.5–2)
LYMPHOCYTES # BLD AUTO: 18.02 K/UL (ref 1–4.8)
LYMPHOCYTES NFR BLD: 84.2 % (ref 22–41)
MANUAL DIFF BLD: NORMAL
MCH RBC QN AUTO: 26.5 PG (ref 27–33)
MCHC RBC AUTO-ENTMCNC: 33.3 G/DL (ref 32.3–36.5)
MCV RBC AUTO: 79.6 FL (ref 81.4–97.8)
MICROCYTES BLD QL SMEAR: ABNORMAL
MONOCYTES # BLD AUTO: 0 K/UL (ref 0–0.85)
MONOCYTES NFR BLD AUTO: 0 % (ref 0–13.4)
MORPHOLOGY BLD-IMP: NORMAL
NEUTROPHILS # BLD AUTO: 3.38 K/UL (ref 1.82–7.42)
NEUTROPHILS NFR BLD: 15.8 % (ref 44–72)
NRBC # BLD AUTO: 0 K/UL
NRBC BLD-RTO: 0 /100 WBC (ref 0–0.2)
PLATELET # BLD AUTO: 174 K/UL (ref 164–446)
PLATELET BLD QL SMEAR: NORMAL
PMV BLD AUTO: 11.7 FL (ref 9–12.9)
POTASSIUM SERPL-SCNC: 4.2 MMOL/L (ref 3.6–5.5)
PROT SERPL-MCNC: 5.2 G/DL (ref 6–8.2)
PROTHROMBIN TIME: 17 SEC (ref 12–14.6)
RBC # BLD AUTO: 4.37 M/UL (ref 4.7–6.1)
RBC BLD AUTO: PRESENT
RH BLD: ABNORMAL
SMUDGE CELLS BLD QL SMEAR: NORMAL
SODIUM SERPL-SCNC: 138 MMOL/L (ref 135–145)
WBC # BLD AUTO: 21.4 K/UL (ref 4.8–10.8)

## 2025-01-01 PROCEDURE — 86901 BLOOD TYPING SEROLOGIC RH(D): CPT

## 2025-01-01 PROCEDURE — 86870 RBC ANTIBODY IDENTIFICATION: CPT | Mod: 91

## 2025-01-01 PROCEDURE — 36415 COLL VENOUS BLD VENIPUNCTURE: CPT

## 2025-01-01 PROCEDURE — 85027 COMPLETE CBC AUTOMATED: CPT

## 2025-01-01 PROCEDURE — 85007 BL SMEAR W/DIFF WBC COUNT: CPT

## 2025-01-01 PROCEDURE — 86480 TB TEST CELL IMMUN MEASURE: CPT

## 2025-01-01 PROCEDURE — 770004 HCHG ROOM/CARE - ONCOLOGY PRIVATE *

## 2025-01-01 PROCEDURE — 70481 CT ORBIT/EAR/FOSSA W/DYE: CPT

## 2025-01-01 PROCEDURE — 99285 EMERGENCY DEPT VISIT HI MDM: CPT

## 2025-01-01 PROCEDURE — 700111 HCHG RX REV CODE 636 W/ 250 OVERRIDE (IP): Mod: JZ,TB | Performed by: EMERGENCY MEDICINE

## 2025-01-01 PROCEDURE — 700105 HCHG RX REV CODE 258: Performed by: EMERGENCY MEDICINE

## 2025-01-01 PROCEDURE — 700117 HCHG RX CONTRAST REV CODE 255: Performed by: EMERGENCY MEDICINE

## 2025-01-01 PROCEDURE — 86850 RBC ANTIBODY SCREEN: CPT

## 2025-01-01 PROCEDURE — 99232 SBSQ HOSP IP/OBS MODERATE 35: CPT | Performed by: STUDENT IN AN ORGANIZED HEALTH CARE EDUCATION/TRAINING PROGRAM

## 2025-01-01 PROCEDURE — 700111 HCHG RX REV CODE 636 W/ 250 OVERRIDE (IP): Mod: JZ | Performed by: EMERGENCY MEDICINE

## 2025-01-01 PROCEDURE — 99223 1ST HOSP IP/OBS HIGH 75: CPT | Mod: GC | Performed by: FAMILY MEDICINE

## 2025-01-01 PROCEDURE — 86905 BLOOD TYPING RBC ANTIGENS: CPT

## 2025-01-01 PROCEDURE — 71045 X-RAY EXAM CHEST 1 VIEW: CPT

## 2025-01-01 PROCEDURE — 70450 CT HEAD/BRAIN W/O DYE: CPT

## 2025-01-01 PROCEDURE — 99221 1ST HOSP IP/OBS SF/LOW 40: CPT | Performed by: STUDENT IN AN ORGANIZED HEALTH CARE EDUCATION/TRAINING PROGRAM

## 2025-01-01 PROCEDURE — 86900 BLOOD TYPING SEROLOGIC ABO: CPT | Mod: 91

## 2025-01-01 PROCEDURE — 96375 TX/PRO/DX INJ NEW DRUG ADDON: CPT

## 2025-01-01 PROCEDURE — 85610 PROTHROMBIN TIME: CPT

## 2025-01-01 PROCEDURE — 87040 BLOOD CULTURE FOR BACTERIA: CPT | Mod: 91

## 2025-01-01 PROCEDURE — 99223 1ST HOSP IP/OBS HIGH 75: CPT | Mod: AI | Performed by: STUDENT IN AN ORGANIZED HEALTH CARE EDUCATION/TRAINING PROGRAM

## 2025-01-01 PROCEDURE — 96365 THER/PROPH/DIAG IV INF INIT: CPT

## 2025-01-01 PROCEDURE — 74174 CTA ABD&PLVS W/CONTRAST: CPT

## 2025-01-01 PROCEDURE — 83605 ASSAY OF LACTIC ACID: CPT

## 2025-01-01 PROCEDURE — 80053 COMPREHEN METABOLIC PANEL: CPT

## 2025-01-01 PROCEDURE — 700111 HCHG RX REV CODE 636 W/ 250 OVERRIDE (IP): Performed by: EMERGENCY MEDICINE

## 2025-01-01 RX ORDER — HYDROMORPHONE HYDROCHLORIDE 2 MG/ML
2 INJECTION, SOLUTION INTRAMUSCULAR; INTRAVENOUS; SUBCUTANEOUS
Status: DISCONTINUED | OUTPATIENT
Start: 2025-01-01 | End: 2025-01-01

## 2025-01-01 RX ORDER — HYDROMORPHONE HYDROCHLORIDE 2 MG/ML
2 INJECTION, SOLUTION INTRAMUSCULAR; INTRAVENOUS; SUBCUTANEOUS
Status: DISCONTINUED | OUTPATIENT
Start: 2025-01-01 | End: 2025-01-01 | Stop reason: HOSPADM

## 2025-01-01 RX ORDER — SODIUM CHLORIDE, SODIUM LACTATE, POTASSIUM CHLORIDE, AND CALCIUM CHLORIDE .6; .31; .03; .02 G/100ML; G/100ML; G/100ML; G/100ML
1000 INJECTION, SOLUTION INTRAVENOUS ONCE
Status: COMPLETED | OUTPATIENT
Start: 2025-01-01 | End: 2025-01-01

## 2025-01-01 RX ORDER — HYDROMORPHONE HYDROCHLORIDE 1 MG/ML
1 INJECTION, SOLUTION INTRAMUSCULAR; INTRAVENOUS; SUBCUTANEOUS
Status: DISCONTINUED | OUTPATIENT
Start: 2025-01-01 | End: 2025-01-01 | Stop reason: HOSPADM

## 2025-01-01 RX ORDER — MIDAZOLAM HYDROCHLORIDE 1 MG/ML
1 INJECTION INTRAMUSCULAR; INTRAVENOUS
Status: DISCONTINUED | OUTPATIENT
Start: 2025-01-01 | End: 2025-01-01 | Stop reason: HOSPADM

## 2025-01-01 RX ORDER — PROPARACAINE HYDROCHLORIDE 5 MG/ML
1 SOLUTION/ DROPS OPHTHALMIC ONCE
Status: DISCONTINUED | OUTPATIENT
Start: 2025-01-01 | End: 2025-01-01

## 2025-01-01 RX ORDER — ONDANSETRON 2 MG/ML
8 INJECTION INTRAMUSCULAR; INTRAVENOUS EVERY 8 HOURS PRN
Status: DISCONTINUED | OUTPATIENT
Start: 2025-01-01 | End: 2025-01-01 | Stop reason: HOSPADM

## 2025-01-01 RX ORDER — ATROPINE SULFATE 10 MG/ML
2 SOLUTION/ DROPS OPHTHALMIC EVERY 4 HOURS PRN
Status: DISCONTINUED | OUTPATIENT
Start: 2025-01-01 | End: 2025-01-01 | Stop reason: HOSPADM

## 2025-01-01 RX ORDER — FLUORESCEIN SODIUM 1 MG/MG
1 STRIP OPHTHALMIC ONCE
Status: DISCONTINUED | OUTPATIENT
Start: 2025-01-01 | End: 2025-01-01

## 2025-01-01 RX ORDER — HYDROMORPHONE HYDROCHLORIDE 2 MG/ML
4 INJECTION, SOLUTION INTRAMUSCULAR; INTRAVENOUS; SUBCUTANEOUS
Status: DISCONTINUED | OUTPATIENT
Start: 2025-01-01 | End: 2025-01-01

## 2025-01-01 RX ORDER — ONDANSETRON 8 MG/1
8 TABLET, ORALLY DISINTEGRATING ORAL EVERY 8 HOURS PRN
Status: DISCONTINUED | OUTPATIENT
Start: 2025-01-01 | End: 2025-01-01 | Stop reason: HOSPADM

## 2025-01-01 RX ORDER — PANTOPRAZOLE SODIUM 40 MG/10ML
40 INJECTION, POWDER, LYOPHILIZED, FOR SOLUTION INTRAVENOUS ONCE
Status: COMPLETED | OUTPATIENT
Start: 2025-01-01 | End: 2025-01-01

## 2025-01-01 RX ORDER — HYDROMORPHONE HYDROCHLORIDE 2 MG/ML
2 INJECTION, SOLUTION INTRAMUSCULAR; INTRAVENOUS; SUBCUTANEOUS ONCE
Status: COMPLETED | OUTPATIENT
Start: 2025-01-01 | End: 2025-01-01

## 2025-01-01 RX ADMIN — IOHEXOL 80 ML: 350 INJECTION, SOLUTION INTRAVENOUS at 04:49

## 2025-01-01 RX ADMIN — HYDROMORPHONE HYDROCHLORIDE 2 MG: 2 INJECTION, SOLUTION INTRAMUSCULAR; INTRAVENOUS; SUBCUTANEOUS at 05:17

## 2025-01-01 RX ADMIN — HYDROMORPHONE HYDROCHLORIDE 2 MG: 2 INJECTION, SOLUTION INTRAMUSCULAR; INTRAVENOUS; SUBCUTANEOUS at 19:55

## 2025-01-01 RX ADMIN — PANTOPRAZOLE SODIUM 40 MG: 40 INJECTION, POWDER, FOR SOLUTION INTRAVENOUS at 04:56

## 2025-01-01 RX ADMIN — MIDAZOLAM HYDROCHLORIDE 1 MG: 1 INJECTION, SOLUTION INTRAMUSCULAR; INTRAVENOUS at 03:16

## 2025-01-01 RX ADMIN — PROTHROMBIN, COAGULATION FACTOR VII HUMAN, COAGULATION FACTOR IX HUMAN, COAGULATION FACTOR X HUMAN, PROTEIN C, PROTEIN S HUMAN, AND WATER 2000 UNITS: KIT at 05:29

## 2025-01-01 RX ADMIN — SODIUM CHLORIDE, POTASSIUM CHLORIDE, SODIUM LACTATE AND CALCIUM CHLORIDE 1000 ML: 600; 310; 30; 20 INJECTION, SOLUTION INTRAVENOUS at 05:40

## 2025-01-01 RX ADMIN — HYDROMORPHONE HYDROCHLORIDE 1 MG: 1 INJECTION, SOLUTION INTRAMUSCULAR; INTRAVENOUS; SUBCUTANEOUS at 10:12

## 2025-01-01 RX ADMIN — MIDAZOLAM HYDROCHLORIDE 1 MG: 1 INJECTION, SOLUTION INTRAMUSCULAR; INTRAVENOUS at 06:26

## 2025-01-01 RX ADMIN — HYDROMORPHONE HYDROCHLORIDE 2 MG: 2 INJECTION, SOLUTION INTRAMUSCULAR; INTRAVENOUS; SUBCUTANEOUS at 00:53

## 2025-01-01 RX ADMIN — HYDROMORPHONE HYDROCHLORIDE 2 MG: 2 INJECTION, SOLUTION INTRAMUSCULAR; INTRAVENOUS; SUBCUTANEOUS at 06:04

## 2025-01-01 ASSESSMENT — PAIN DESCRIPTION - PAIN TYPE
TYPE: ACUTE PAIN

## 2025-01-01 ASSESSMENT — FIBROSIS 4 INDEX: FIB4 SCORE: 0.83

## 2025-06-20 PROBLEM — K31.5: Status: ACTIVE | Noted: 2025-01-01

## 2025-06-20 PROBLEM — Z51.5 COMFORT MEASURES ONLY STATUS: Status: ACTIVE | Noted: 2025-01-01

## 2025-06-20 NOTE — ASSESSMENT & PLAN NOTE
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  Due to extensive history surgery recommended comfort measures which patient agreed to  Comfort order set utilized

## 2025-06-20 NOTE — ED TRIAGE NOTES
"Chief Complaint   Patient presents with    Vomiting     Coffee ground emesis - started 6/19/25. Pt has a roman catheter with blood around meatus and tubing, as well as a bandage over what was told do be a \"feeding tube\" incision, no feeding tube, with black putrid discharge coming from it.  BIB [facility] - 1 L fluids, 4 Zofran, 5 Haldol, vitals stable, baseline left eye cloudy and red. Multiple co morbidities, pt poor historian per [facility]     Pt presents today from [facility], with complaints of N/V with coffee ground emesis. Pt is believed to have a history of lukemia, skin cancer, as well as other medical conditions, [facility] was unable to get information on.   Pt has a roman - draining.  Pt has LLQ bandage with black drainage from what was estimated to be an old feeding tube port  Pt has large R side groin swelling, - pt unable to state exactly what it is.  Pt on continuous monitoring.  "

## 2025-06-20 NOTE — ED PROVIDER NOTES
"ED Provider Note    CHIEF COMPLAINT  Chief Complaint   Patient presents with    Vomiting     Coffee ground emesis - started 6/19/25. Pt has a roman catheter with blood around meatus and tubing, as well as a bandage over what was told do be a \"feeding tube\" incision, no feeding tube, with black putrid discharge coming from it.  [facility] - 1 L fluids, 4 Zofran, 5 Haldol, vitals stable, baseline left eye cloudy and red. Multiple co morbidities, pt poor historian per [facility]       EXTERNAL RECORDS REVIEWED  EHR review reveals patient with very complicated history.  Infectious disease note April 30, 2025 for osteomyelitis of left ear status post auriculectomy and parotidectomy and left temporal bone resection with left facial nerve sacrifice, on levofloxacin, minocycline, metronidazole for lifelong suppression.  Note from neurology May 6 to the 25, patient with longstanding history of BPH, has a gastrostomy tube that was placed March 13, 2025 and ethmoidectomy February 27, 2025.  Most recent note from infectious disease June 4, 2025, patient was having issues moving his left eye, and having eye pain here, but left AGAINST MEDICAL ADVICE to receive antibiotics at home.      HPI/ROS  LIMITATION TO HISTORY   Select: Dysarthria      Wally Joel is a 85 y.o. male who presents with multiple episodes of bloody emesis.  Patient reports this started he believes yesterday.  Patient reports multiple episodes of bloody emesis.  He believes he may have had some black stool but is not sure.  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  Patient also had a G-tube placed for feeding issues, he states this was removed after he left AGAINST MEDICAL ADVICE from his care facility in early June.  This has been leaking coffee-ground fluids, but he is unsure for how long.  Patient denies any significant abdominal pain.  " "He does report that he has a history of use of anticoagulant, he is on Eliquis but is unsure why (EHR review reveals patient with history of DVT).  Patient is a relatively poor historian and therefore history is limited here    PAST MEDICAL HISTORY   has a past medical history of BPH (benign prostatic hyperplasia), Cancer (HCC), Cancer of skin of left ear, CLL (chronic lymphocytic leukemia) (HCC) (07/2014), Inguinal hernia, and Jaundice.    SURGICAL HISTORY   has a past surgical history that includes other abdominal surgery (2010); cystoscopy (10/29/2014); trans urethral resection prostate (10/29/2014); laparoscopic inguinal hernia repair (3/1/2017); other orthopedic surgery (1988, 1989); cystoscopy (3/16/2018); exploratory of abdomen (N/A, 11/20/2020); ventral hernia repair (N/A, 11/20/2020); and excision, lesion, head and neck region (Left, 4/24/2023).    FAMILY HISTORY  Family History   Problem Relation Age of Onset    Liver Cancer Father     Pancreatic Cancer Sister        SOCIAL HISTORY  Social History     Tobacco Use    Smoking status: Never    Smokeless tobacco: Never   Vaping Use    Vaping status: Never Used   Substance and Sexual Activity    Alcohol use: Yes     Alcohol/week: 0.6 oz     Types: 1 Cans of beer per week     Comment: 1 per month    Drug use: No    Sexual activity: Not on file       CURRENT MEDICATIONS  Home Medications       Reviewed by Clarissa Flowers R.N. (Registered Nurse) on 06/20/25 at 0342  Med List Status: Partial     Medication Last Dose Status   Leuprolide Acetate, 6 Month, (LUPRON DEPOT, 6-MONTH, IM)  Active   Non Formulary Request  Active   pregabalin (LYRICA) 50 MG capsule  Active                  Audit from Redirected Encounters    **Home medications have not yet been reviewed for this encounter**         ALLERGIES  Allergies[1]    PHYSICAL EXAM  VITAL SIGNS: /70   Pulse (!) 101   Temp 36.2 °C (97.1 °F) (Temporal)   Resp 20   Ht 1.549 m (5' 1\")   Wt 54.4 kg (120 lb)   " SpO2 95%   BMI 22.67 kg/m²    Physical Exam  HENT:      Head:      Comments: Left ear has been removed, there is a large skull indent from prior surgery, there is no bogginess of this area.     Mouth/Throat:      Comments: Blood around oropharynx  Eyes:      Comments: Left eye with conjunctival injection and mild chemosis   Cardiovascular:      Rate and Rhythm: Regular rhythm. Tachycardia present.   Pulmonary:      Effort: Pulmonary effort is normal. No respiratory distress.      Breath sounds: Normal breath sounds. No stridor.   Abdominal:      Comments: Prior gastrostomy tube site is draining coffee-ground fluid, some minimal surrounding erythema.   Genitourinary:     Comments: Chen is actively draining dark urine,   There is gross melena  Neurological:      Comments: Left-sided facial weakness which is chronic, dysarthria, moving all extremities, sensation intact throughout          EKG/LABS  Results for orders placed or performed during the hospital encounter of 06/20/25   HOLD BLOOD BANK SPECIMEN (NOT TESTED)    Collection Time: 06/20/25  3:40 AM   Result Value Ref Range    Holding Tube - Bb DONE    CBC with Differential    Collection Time: 06/20/25  3:40 AM   Result Value Ref Range    WBC 21.4 (H) 4.8 - 10.8 K/uL    RBC 4.37 (L) 4.70 - 6.10 M/uL    Hemoglobin 11.6 (L) 14.0 - 18.0 g/dL    Hematocrit 34.8 (L) 42.0 - 52.0 %    MCV 79.6 (L) 81.4 - 97.8 fL    MCH 26.5 (L) 27.0 - 33.0 pg    MCHC 33.3 32.3 - 36.5 g/dL    RDW 51.0 (H) 35.9 - 50.0 fL    Platelet Count 174 164 - 446 K/uL    MPV 11.7 9.0 - 12.9 fL    Neutrophils-Polys 15.80 (L) 44.00 - 72.00 %    Lymphocytes 84.20 (H) 22.00 - 41.00 %    Monocytes 0.00 0.00 - 13.40 %    Eosinophils 0.00 0.00 - 6.90 %    Basophils 0.00 0.00 - 1.80 %    Nucleated RBC 0.00 0.00 - 0.20 /100 WBC    Neutrophils (Absolute) 3.38 1.82 - 7.42 K/uL    Lymphs (Absolute) 18.02 (H) 1.00 - 4.80 K/uL    Monos (Absolute) 0.00 0.00 - 0.85 K/uL    Eos (Absolute) 0.00 0.00 - 0.51 K/uL     Baso (Absolute) 0.00 0.00 - 0.12 K/uL    NRBC (Absolute) 0.00 K/uL    Anisocytosis 1+     Microcytosis 2+ (A)    Complete Metabolic Panel    Collection Time: 06/20/25  3:40 AM   Result Value Ref Range    Sodium 138 135 - 145 mmol/L    Potassium 4.2 3.6 - 5.5 mmol/L    Chloride 104 96 - 112 mmol/L    Co2 18 (L) 20 - 33 mmol/L    Anion Gap 16.0 7.0 - 16.0    Glucose 160 (H) 65 - 99 mg/dL    Bun 33 (H) 8 - 22 mg/dL    Creatinine 0.88 0.50 - 1.40 mg/dL    Calcium 9.1 8.5 - 10.5 mg/dL    Correct Calcium 9.9 8.5 - 10.5 mg/dL    AST(SGOT) 587 (H) 12 - 45 U/L    ALT(SGPT) 425 (H) 2 - 50 U/L    Alkaline Phosphatase 500 (H) 30 - 99 U/L    Total Bilirubin 0.5 0.1 - 1.5 mg/dL    Albumin 3.0 (L) 3.2 - 4.9 g/dL    Total Protein 5.2 (L) 6.0 - 8.2 g/dL    Globulin 2.2 1.9 - 3.5 g/dL    A-G Ratio 1.4 g/dL   PT/INR    Collection Time: 06/20/25  3:40 AM   Result Value Ref Range    PT 17.0 (H) 12.0 - 14.6 sec    INR 1.38 (H) 0.87 - 1.13   COD - Adult (Type and Screen)    Collection Time: 06/20/25  3:40 AM   Result Value Ref Range    ABO Grouping Only UNDET     Rh Grouping Only POS     Antibody Screen-Cod POS (A)    ANTIBODY IDENTIFICATION    Collection Time: 06/20/25  3:40 AM   Result Value Ref Range    Antibody Id POS, Cold antibody (A)     Antibody Id POS, AntibodySpecificity:Undetermined (A)    ESTIMATED GFR    Collection Time: 06/20/25  3:40 AM   Result Value Ref Range    GFR (CKD-EPI) 85 >60 mL/min/1.73 m 2   DIFFERENTIAL MANUAL    Collection Time: 06/20/25  3:40 AM   Result Value Ref Range    Manual Diff Status PERFORMED     Comment See Comment    PERIPHERAL SMEAR REVIEW    Collection Time: 06/20/25  3:40 AM   Result Value Ref Range    Peripheral Smear Review see below    PLATELET ESTIMATE    Collection Time: 06/20/25  3:40 AM   Result Value Ref Range    Plt Estimation Normal    MORPHOLOGY    Collection Time: 06/20/25  3:40 AM   Result Value Ref Range    RBC Morphology Present     Smudge Cells Many    RBC ANTIGEN TYPING, A1     Collection Time: 06/20/25  3:40 AM   Result Value Ref Range    RBC Antigen Typing, A1 POS    Lactic Acid    Collection Time: 06/20/25  4:12 AM   Result Value Ref Range    Lactic Acid 1.5 0.5 - 2.0 mmol/L   Blood Culture - Draw one from central line and one from peripheral site    Collection Time: 06/20/25  4:26 AM    Specimen: Line; Blood   Result Value Ref Range    Significant Indicator NEG     Source BLD     Site Peripheral     Culture Result       No Growth  Note: Blood cultures are incubated for 5 days and  are monitored continuously.Positive blood cultures  are called to the RN and reported as soon as  they are identified.     Blood Culture - Draw one from central line and one from peripheral site    Collection Time: 06/20/25  5:15 AM    Specimen: Peripheral; Blood   Result Value Ref Range    Significant Indicator NEG     Source BLD     Site PERIPHERAL     Culture Result       No Growth  Note: Blood cultures are incubated for 5 days and  are monitored continuously.Positive blood cultures  are called to the RN and reported as soon as  they are identified.     ABO Rh Confirm    Collection Time: 06/20/25  5:21 AM   Result Value Ref Range    ABO Rh Confirm UNDET POS    ANTIBODY SCREEN    Collection Time: 06/20/25  5:21 AM   Result Value Ref Range    Antibody Screen Scrn POS (A)        I have independently interpreted this EKG    RADIOLOGY/PROCEDURES   I have independently interpreted the diagnostic imaging associated with this visit and am waiting the final reading from the radiologist.   My preliminary interpretation is as follows: Questionable bowel obstruction    Radiologist interpretation:  [id number] WITH   Final Result         1.  Metallic density along the anterior aspect of the left globe, appearance favoring metallic foreign body, may be penetrating the globe.   2.  Mild bilateral maxillary sinusitis changes.      These findings were discussed with the patient's clinician, Rui Rutherford, on  6/20/2025 5:48 AM.      CT-HEAD W/O   Final Result         1.  No acute intracranial abnormality is identified, there are nonspecific white matter changes, commonly associated with small vessel ischemic disease.  Associated mild cerebral atrophy is noted.   2.  Metallic density anterior to the left globe, appearance compatible with foreign body, correlate with history and exam.   3.  Bilateral sphenoid sinusitis changes   4.  Atherosclerosis.               CTA ABDOMEN PELVIS W & W/O POST PROCESS   Final Result         1.  No active GI bleed identified.   2.  Swirled appearance of the gastric pylorus and first duodenal segment, appearance concerning for component of duodenal volvulus, distention of the stomach suggests component of gastric outlet obstructive changes.   3.  Diffuse esophageal wall thickening with mucosal enhancement, appearance suggests changes of esophagitis.   4.  Right adrenal nodule, follow-up adrenal protocol CT or MRI for further characterization as clinically appropriate.   5.  Right lateral abdominal wall hernia containing large and small bowel loops.   6.  Diffuse bladder wall thickening, consider changes of cystitis, correlate with urinalysis.   7.  Air within the bladder, could represent changes of recent instrumentation, otherwise consider emphysematous cystitis.   8.  Markedly enlarged prostate, workup and evaluation for causes of prostate enlargement recommended as clinically appropriate.   9.  Hazy linear densities in the right lung base, appearance suggests atelectasis.      These findings were discussed with the patient's clinician, Rui Rutherford, on 6/20/2025 5:34 AM.      DX-CHEST-PORTABLE (1 VIEW)   Final Result         1.  No acute cardiopulmonary disease.        CRITICAL CARE  The very real possibilty of a deterioration of this patient's condition required the highest level of my preparedness for sudden, emergent intervention.  I provided critical care services, which included  medication orders, frequent reevaluations of the patient's condition and response to treatment, ordering and reviewing test results, and discussing the case with various consultants.  The critical care time associated with the care of the patient was 40 minutes. Review chart for interventions. This time is exclusive of any other billable procedures.       COURSE & MEDICAL DECISION MAKING    ASSESSMENT, COURSE AND PLAN  Care Narrative:   Patient here with upper GI bleed and very complicated medical history. Patient is on Eliquis, and has obvious signs of an upper GI bleed therefore he received Kcentra.  He also received IV pantoprazole.  It is unclear what antibiotics patient is on as he is home and is no longer receiving IV antibiotics for his chronic osteomyelitis and questionable orbital cellulitis of his left eye.  Will check CT head to see if there is any obvious new involvement or skull abscess or concerning findings otherwise or findings to suggest worsening infection.  Will check CTA abdomen and pelvis to evaluate if patient would have a bleeding vessel amenable to interventional radiology and to identify if there is an active blush.  Patient mildly hypotensive, he is mildly tachycardic.  Sepsis orders were sent as well.  CTA reveals concerning findings evaluate proximal bowel obstruction, CT orbit reveals questionable orbital foreign body.  I discussed the case initially with general surgery, they report that this is very unlikely to be a successful surgically managed patient, they have evaluated him.  Given his multiple comorbidities they believe any surgical management would likely result in patient's death.  I did discuss the case with GI as well, they are reviewing the case.  I discussed the case with patient, and we had a very long discussion regarding goals of care.  Patient is actually very comfortable with simply pursuing comfort measures, he does not want any surgeries or procedures and understands  that this would likely result in his death.  He states he is comfortable with this, and is of sound mind to make this decision.  Therefore patient made comfort measures only.  Patient's POA was updated regarding patient's decision, and they are very comfortable with this and agree.Patient was given Dilaudid.    Following this patient did become hypoxic, palliative care orders were placed.  Patient was admitted for ongoing palliative care and expectant death.        DISPOSITION AND DISCUSSIONS  I have discussed management of the patient with the following physicians and ZAINAB's:  Dr. Weir GI, Dr. Amaya Gen Surg      Escalation of care considered, and ultimately not performed: Procedural management, IV antibiotics, and further care was deferred outside of comfort measures only after long discussion with patient.        FINAL DIAGNOSIS  1. SBO (small bowel obstruction) (HCC)        2. UGIB (upper gastrointestinal bleed)        3. Comfort measures only status  Referral to Hospice      4. Duodenal volvulus  Referral to Hospice                 [1]   Allergies  Allergen Reactions    Ciprofloxacin Unspecified     Lethargic.skin turned yellow, could not pee  [id number]

## 2025-06-20 NOTE — HOSPITAL COURSE
Wally Joel is an 85-year-old male with PMHx CLL, skull osteomyelitis s/p left ear auriculectomy, parotidectomy and left temporal bone resection with left facial nerve sacrifice who is on antibiotics for lifelong suppression.  Admitted 6/20 for coffee-ground emesis.    Patient has had declining functional and health status for many months.  He is now coming in with bloody emesis.  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  CT abdomen concerning for duodenal volvulus.  General surgery was consulted.  Given patient's extensive medical history and comorbidities, not acute abdomen and relatively good pain control-previous providers and patient have elected to pursue comfort measures alone.    Hospice referral was placed.  However, there does not appear to be advanced directives in place.  Patient was able to clearly state his desires for comfort measures only.  Initially, planning for discharge with hospice.  However patient passed peacefully away on 6/21 at 3:51 PM.

## 2025-06-20 NOTE — DISCHARGE PLANNING
Case Management Discharge Planning    Admission Date: 6/20/2025  GMLOS:    ALOS: 0    6-Clicks ADL Score:    6-Clicks Mobility Score:        Anticipated Discharge Dispo: Discharge Disposition: D/T to hospice home (50)    DME Needed: No    Action(s) Taken: Discussed in IDT rounds, pt does not have any NOK, friend stated pt's friend Savi Gray is DPOA and  Wili had copy of POA paperwork, looked through [facility] and care every where no AD packet. Pt will need placement as he cannot return to his house in [place], quant requested today. No one to make hospice choice.     Escalations Completed: None    Medically Clear: No    Next Steps: Pending quant and group home placement.     Barriers to Discharge: Medical clearance and Pending Placement    Is the patient up for discharge tomorrow: No

## 2025-06-20 NOTE — CONSULTS
MRN: [id number]  Date of palliative consult:   Reason for consult: End of life care   Referring provider: Hospitalist   Location of consult: [facility]  Additional consulting services: none    HPI:   Wally Joel is a 85 y.o. male who presented 2025 with coffee-ground emesis.  He has a past medical history of CLL, skull osteomyelitis s/p left ear auriculectomy, parotidectomy and left temporal bone resection. 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  CT abdomen in the ED shows findings concerning for a duodenal volvulus. The ED provider discussed with patient and decision was made to transition to comfort measures only care plan. Per note, patient's friend/ Christine was updated in ED. We were consulted to assist with end of life care.     On my initial evaluation patient is laying in bed obtunded with slow, agonal breathing. He is not responsive to any stimuli. Patient received 2mg IV Hydromorphone at 6am for symptom management to assist in comfort care. Much of history is obtained from chart review and provider as patient cannot participate in history. 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 His only medication for symptom management seem to be Lyrica.     Additional Pertinent Medical History: as above    Additional symptoms: unable to access     Medication Allergy/Sensitivities:  Allergies[1]    ROS:    ROS  Unable to access, patient is obtunded   PE:   Recent vital signs  BMI: Body mass index is 22.67 kg/m².    Temp (24hrs), Av.2 °C (97.1 °F), Min:36.1 °C (97 °F), Max:36.2 °C (97.1 °F)  Temperature: 36.1 °C (97 °F)  Pulse  Av.4  Min: 88  Max: 109   Blood Pressure : (!) 84/56 (RN aware)       Physical Exam  General: Patient is laying in bed obtunded. Dried blood noted around mouth.   Resp: Slow, agonal breathing pattern. Minimal secretions   Abd: distended, no painful response to palpation   Psych: unable to access      Recent Labs     06/20/25  0340   SODIUM 138   POTASSIUM 4.2   CHLORIDE 104   CO2 18*   GLUCOSE 160*   BUN 33*   CREATININE 0.88   CALCIUM 9.1     Recent Labs     06/20/25  0340   WBC 21.4*   RBC 4.37*   HEMOGLOBIN 11.6*   HEMATOCRIT 34.8*   MCV 79.6*   MCH 26.5*   MCHC 33.3   RDW 51.0*   PLATELETCT 174   MPV 11.7       ASSESSMENT/PLAN WITH SHARED DECISION MAKING:   Review  Pertinent imaging reviewed.    PHYSICAL ASPECTS OF CARE  Palliative Performance Scale: 10%    # End of life care in the setting of terminal illness. (CLL, skull osteomyelitis s/p resection, and now complicated by duodenal volvulus and GI bleed):   -Given patient is opiate naive would recommend 1mg IV for moderate and 2mg IV Hydromorphone for severe sign of pain and dyspnea q1hr. Patient received 2mg two hours prior with good relief.   -1mg IV Valium q1hr for agitation, restlessness, or anxiety   -Atropine drops for secretions     Orders were placed following discussion with hospitalist. Expect a prognosis of hours to possibly days.       GOALS OF CARE/SERIOUS ILLNESS CONVERSATION  Patient is obtunded and unable to participate in any discussion. Per chart review, patient elected to transition to a comfort measures only care plan in the ED. Patient's friend/ in chart was updated.     Code Status: Comfort measures only care plan     ACP Documents: none    60 minutes spent discussing advance care planning, this time excludes any other billed services.    Interval diagnostic studies and medical documentation entries pertinent to this case were reviewed independently by me. This patient has at least one acute or chronic illness or injury that poses a threat to life or bodily function. This patient suffers from a high risk of morbidity from additional invasive diagnostic testing or intensive treatment. Discussion of recommendations and coordination of care undertaken with primary provider/treatment team.    Diann Carlos MD   HPM Fellow  PGY4       [1]   Allergies  Allergen Reactions    Ciprofloxacin Unspecified     Lethargic.skin turned yellow, could not pee  [id number]

## 2025-06-20 NOTE — H&P
"Hospital Medicine History & Physical Note    Date of Service  6/20/2025    Primary Care Physician  ROBBY Gabriel    Code Status  Comfort Care/DNR    Chief Complaint  Chief Complaint   Patient presents with    Vomiting     Coffee ground emesis - started 6/19/25. Pt has a roman catheter with blood around meatus and tubing, as well as a bandage over what was told do be a \"feeding tube\" incision, no feeding tube, with black putrid discharge coming from it.  [facility] - 1 L fluids, 4 Zofran, 5 Haldol, vitals stable, baseline left eye cloudy and red. Multiple co morbidities, pt poor historian per care flight     History of Presenting Illness  Wally Joel is a 85 y.o. male who presented 6/20/2025 with coffee-ground emesis.  PMH of CLL, skull osteomyelitis s/p left ear auriculectomy, parotidectomy and left temporal bone resection with left facial nerve sacrifice who is on antibiotics for lifelong suppression.  Comes in after he began having with bloody emesis.  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  CT abdomen in the ED shows findings concerning for a duodenal volvulus.  EDP discussed with surgery who recommended comfort measures.  EDP discussed with patient who agreed to comfort measures.    I discussed the plan of care with patient, bedside RN, and edp.    Review of Systems  Review of Systems   Unable to perform ROS: Mental status change       Past Medical History   has a past medical history of BPH (benign prostatic hyperplasia), Cancer (HCC), Cancer of skin of left ear, CLL (chronic lymphocytic leukemia) (HCC) (07/2014), Inguinal hernia, and Jaundice.    Surgical History   has a past surgical history that includes other abdominal surgery (2010); cystoscopy (10/29/2014); trans urethral resection prostate (10/29/2014); laparoscopic inguinal hernia repair (3/1/2017); other orthopedic surgery (1988, 1989); cystoscopy (3/16/2018); pr exploratory " "of abdomen (N/A, 11/20/2020); ventral hernia repair (N/A, 11/20/2020); and excision, lesion, head and neck region (Left, 4/24/2023).     Family History  Family History   Problem Relation Age of Onset    Liver Cancer Father     Pancreatic Cancer Sister         Family history reviewed with patient. There is no family history that is pertinent to the chief complaint.     Social History   reports that he has never smoked. He has never used smokeless tobacco. He reports current alcohol use of about 0.6 oz of alcohol per week. He reports that he does not use drugs.    Allergies  Allergies[1]    Medications  Prior to Admission Medications   Prescriptions Last Dose Informant Patient Reported? Taking?   Leuprolide Acetate, 6 Month, (LUPRON DEPOT, 6-MONTH, IM)  Patient Yes No   Sig: Inject  into the shoulder, thigh, or buttocks every 6 months. \"[facility]\"   Non Formulary Request  Patient Yes No   Sig: Take 1 Tablet by mouth 1 time a day as needed (pain). \"Advil + Tylenol combo\" OTC   pregabalin (LYRICA) 50 MG capsule  Patient Yes No   Sig: Take 50 mg by mouth 2 times a day.      Facility-Administered Medications: None       Physical Exam  Temp:  [36.2 °C (97.1 °F)] 36.2 °C (97.1 °F)  Pulse:  [] 88  Resp:  [20] 20  BP: (103-115)/(62-75) 113/62  SpO2:  [90 %-95 %] 94 %  Blood Pressure : 103/63   Temperature: 36.2 °C (97.1 °F)   Pulse: 100   Respiration: 20   Pulse Oximetry: 93 %       Physical Exam  Constitutional:       Appearance: He is ill-appearing.   Pulmonary:      Effort: Respiratory distress present.         Laboratory:  Recent Labs     06/20/25  0340   WBC 21.4*   RBC 4.37*   HEMOGLOBIN 11.6*   HEMATOCRIT 34.8*   MCV 79.6*   MCH 26.5*   MCHC 33.3   RDW 51.0*   PLATELETCT 174   MPV 11.7     Recent Labs     06/20/25  0340   SODIUM 138   POTASSIUM 4.2   CHLORIDE 104   CO2 18*   GLUCOSE 160*   BUN 33*   CREATININE 0.88   CALCIUM 9.1     Recent Labs     06/20/25  0340   ALTSGPT 425*   ASTSGOT 587* " "  ALKPHOSPHAT 500*   TBILIRUBIN 0.5   GLUCOSE 160*     Recent Labs     06/20/25  0340   INR 1.38*     No results for input(s): \"NTPROBNP\" in the last 72 hours.      No results for input(s): \"TROPONINT\" in the last 72 hours.    Imaging:  [id number] WITH   Final Result         1.  Metallic density along the anterior aspect of the left globe, appearance favoring metallic foreign body, may be penetrating the globe.   2.  Mild bilateral maxillary sinusitis changes.      These findings were discussed with the patient's clinician, Rui Rutherford, on 6/20/2025 5:48 AM.      CT-HEAD W/O   Final Result         1.  No acute intracranial abnormality is identified, there are nonspecific white matter changes, commonly associated with small vessel ischemic disease.  Associated mild cerebral atrophy is noted.   2.  Metallic density anterior to the left globe, appearance compatible with foreign body, correlate with history and exam.   3.  Bilateral sphenoid sinusitis changes   4.  Atherosclerosis.               CTA ABDOMEN PELVIS W & W/O POST PROCESS   Final Result         1.  No active GI bleed identified.   2.  Swirled appearance of the gastric pylorus and first duodenal segment, appearance concerning for component of duodenal volvulus, distention of the stomach suggests component of gastric outlet obstructive changes.   3.  Diffuse esophageal wall thickening with mucosal enhancement, appearance suggests changes of esophagitis.   4.  Right adrenal nodule, follow-up adrenal protocol CT or MRI for further characterization as clinically appropriate.   5.  Right lateral abdominal wall hernia containing large and small bowel loops.   6.  Diffuse bladder wall thickening, consider changes of cystitis, correlate with urinalysis.   7.  Air within the bladder, could represent changes of recent instrumentation, otherwise consider emphysematous cystitis.   8.  Markedly enlarged prostate, workup and evaluation for causes of prostate " enlargement recommended as clinically appropriate.   9.  Hazy linear densities in the right lung base, appearance suggests atelectasis.      These findings were discussed with the patient's clinician, Rui Rutherford, on 6/20/2025 5:34 AM.      DX-CHEST-PORTABLE (1 VIEW)   Final Result         1.  No acute cardiopulmonary disease.          X-Ray:  I have personally reviewed the images and compared with prior images. and My impression is: No acute process    Assessment/Plan:  Justification for Admission Status  I anticipate this patient will require at least two midnights for appropriate medical management, necessitating inpatient admission because comfort care admission    * Comfort measures only status- (present on admission)  Assessment & Plan  history of CLL, osteomyelitis on suppression antibiotics, history of G-tube   comes in with a duodenal volvulus.  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

## 2025-06-20 NOTE — CONSULTS
DATE OF CONSULTATION:  6/20/2025     REFERRING PHYSICIAN:   JOSE Rutherford.     CONSULTING PHYSICIAN:  George Amaya M.D.     REASON FOR CONSULTATION:  I have been asked by Dr. Rutherford to see the patient in surgical consultation for evaluation of GI bleed.    HISTORY OF PRESENT ILLNESS: The patient is an 85 year-old  elderly man who presents to the Emergency Department with a 24-hour history of frequent coffee ground emesis. This patient has been treated for head and neck cancer and appears to have had ethmoidectomy as well as skull resections.  He had a gatric tube in place until a few weeks ago.  He states that he is tolerating PO intake and his medical team took it out.  The site had continued to leak gastric contents and in the past 24 hours now has thick black coffee ground like bloody output.  He denies abdominal pain, he cannot recall when his last bowel movement was but states that it was recent and may have had a similar coffee ground look to it.  He takes Eliquis but is not sure why.    PAST MEDICAL HISTORY:  has a past medical history of BPH (benign prostatic hyperplasia), Cancer (HCC), Cancer of skin of left ear, CLL (chronic lymphocytic leukemia) (HCC) (07/2014), Inguinal hernia, and Jaundice.    PAST SURGICAL HISTORY:  has a past surgical history that includes other abdominal surgery (2010); cystoscopy (10/29/2014); trans urethral resection prostate (10/29/2014); laparoscopic inguinal hernia repair (3/1/2017); other orthopedic surgery (1988, 1989); cystoscopy (3/16/2018); pr exploratory of abdomen (N/A, 11/20/2020); ventral hernia repair (N/A, 11/20/2020); and excision, lesion, head and neck region (Left, 4/24/2023).    ALLERGIES: Allergies[1]    CURRENT MEDICATIONS:    Home Medications       Reviewed by Clarissa Flowers R.N. (Registered Nurse) on 06/20/25 at 0342  Med List Status: Partial     Medication Last Dose Status   Leuprolide Acetate, 6 Month, (LUPRON DEPOT, 6-MONTH, IM)  Active   Non  Formulary Request  Active   pregabalin (LYRICA) 50 MG capsule  Active                  Audit from Redirected Encounters    **Home medications have not yet been reviewed for this encounter**         FAMILY HISTORY: family history includes Liver Cancer in his father; Pancreatic Cancer in his sister.    SOCIAL HISTORY:  reports that he has never smoked. He has never used smokeless tobacco. He reports current alcohol use of about 0.6 oz of alcohol per week. He reports that he does not use drugs.    REVIEW OF SYSTEMS: Review of systems is remarkable for the following hematemesis. The remainder of the comprehensive ROS is negative with the exception of the aforementioned HPI, PMH, and PSH bullets in accordance with CMS guideline.    PHYSICAL EXAMINATION:    Physical Exam  Constitutional:       Comments: Incredibly frail and chronically ill appearing elderly man   HENT:      Head:      Comments: Post surgical changes to left occipitoparietal area, likely flap coverage.  Mass wasting of the cheeks.  Dried coffee ground like material on lips.  Poor dentition  Pulmonary:      Effort: Pulmonary effort is normal.   Abdominal:      Comments: There is a small circular wound, likely prior G tube site with coffee ground like drainage from the wound.  His right lateral abdomen there is a large chronic hernia with palpable bowel contents.  The abdomen is non-tender and does not appear distended   Skin:     General: Skin is warm and dry.   Neurological:      Mental Status: He is oriented to person, place, and time. Mental status is at baseline.         LABORATORY VALUES:   Recent Labs     06/20/25  0340   WBC 21.4*   RBC 4.37*   HEMOGLOBIN 11.6*   HEMATOCRIT 34.8*   MCV 79.6*   MCH 26.5*   MCHC 33.3   RDW 51.0*   PLATELETCT 174   MPV 11.7     Recent Labs     06/20/25  0340   SODIUM 138   POTASSIUM 4.2   CHLORIDE 104   CO2 18*   GLUCOSE 160*   BUN 33*   CREATININE 0.88   CALCIUM 9.1     Recent Labs     06/20/25  0340   ASTSGOT 587*    ALTSGPT 425*   TBILIRUBIN 0.5   ALKPHOSPHAT 500*   GLOBULIN 2.2   INR 1.38*     Recent Labs     06/20/25  0340   INR 1.38*        IMAGING:   ON-[id number] WITH   Final Result         1.  Metallic density along the anterior aspect of the left globe, appearance favoring metallic foreign body, may be penetrating the globe.   2.  Mild bilateral maxillary sinusitis changes.      These findings were discussed with the patient's clinician, Rui Rutherford, on 6/20/2025 5:48 AM.      CT-HEAD W/O   Final Result         1.  No acute intracranial abnormality is identified, there are nonspecific white matter changes, commonly associated with small vessel ischemic disease.  Associated mild cerebral atrophy is noted.   2.  Metallic density anterior to the left globe, appearance compatible with foreign body, correlate with history and exam.   3.  Bilateral sphenoid sinusitis changes   4.  Atherosclerosis.               CTA ABDOMEN PELVIS W & W/O POST PROCESS   Final Result         1.  No active GI bleed identified.   2.  Swirled appearance of the gastric pylorus and first duodenal segment, appearance concerning for component of duodenal volvulus, distention of the stomach suggests component of gastric outlet obstructive changes.   3.  Diffuse esophageal wall thickening with mucosal enhancement, appearance suggests changes of esophagitis.   4.  Right adrenal nodule, follow-up adrenal protocol CT or MRI for further characterization as clinically appropriate.   5.  Right lateral abdominal wall hernia containing large and small bowel loops.   6.  Diffuse bladder wall thickening, consider changes of cystitis, correlate with urinalysis.   7.  Air within the bladder, could represent changes of recent instrumentation, otherwise consider emphysematous cystitis.   8.  Markedly enlarged prostate, workup and evaluation for causes of prostate enlargement recommended as clinically appropriate.   9.  Hazy linear densities in the right lung  base, appearance suggests atelectasis.      These findings were discussed with the patient's clinician, Rui Rutherford, on 6/20/2025 5:34 AM.      DX-CHEST-PORTABLE (1 VIEW)   Final Result         1.  No acute cardiopulmonary disease.          ASSESSMENT AND PLAN:     This is a frail 85 year old man who has undergone surgical resection of a head and neck cancer.  He is presenting for coffee ground emesis and persistent coffee ground drainage from a prior G tube site.   CT imaging shows gastric distention with a possible duodenal volvulus.  There is also an enormous right lateral abdominal wall hernia that contains a large amount of small and large bowel.  CTA did not however localize the site of bleeding.  His health has been declining overall and after discussion with the ED provider he has elected for comfort care measures.  He has considered hospice before but left the hospital Against medial advice last admission before these conversations could be completed.  Given his benign abdominal exam and stated goals of care there is no indication for urgent or emergent surgical intervention.  If he desires further medical care would recommend NGT decompression, maximum medical therapy for GI bleeding including reversal of Eliquis, and consider EGD.      Since patient has elected for comfort care measures.  ACS will sign off.  Please reconsult if there is any new questions or concerns arise.    DISPOSITION: Medical evaluation and admission. The patient was admitted to the Medical Service prior to surgical consultation.      ____________________________________     George Amaya M.D.    DD: 6/20/2025  6:12 AM    AAST Grading System for EGS Conditions  ACS NSQIP Surgical Risk Calculator         [1]   Allergies  Allergen Reactions    Ciprofloxacin Unspecified     Lethargic.skin turned yellow, could not pee  [id number]

## 2025-06-20 NOTE — PROGRESS NOTES
[facility] Medicine Daily Progress Note    Date of Service  6/20/2025    Chief Complaint  Wally Joel is a 85 y.o. male admitted 6/20/2025 with coffe ground emesis.    Hospital Course  Wally Joel is a 85 y.o. male who presented 6/20/2025 with coffee-ground emesis.  PMH of CLL, skull osteomyelitis s/p left ear auriculectomy, parotidectomy and left temporal bone resection with left facial nerve sacrifice who is on antibiotics for lifelong suppression.  Comes in after he began having with bloody emesis.  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  CT abdomen in the ED shows findings concerning for a duodenal volvulus.  EDP discussed with surgery who recommended comfort measures.  EDP discussed with patient who agreed to comfort measures.     Interval Problem Update    Patient health has been declining for many months.  He elected comfort care in the ER, surgery at that time signed off.  Placed inpatient referral to transfer care to hospice.  Patient examined at bedside was nonparticipatory and non responsive to questioning.       I have discussed this patient's plan of care and discharge plan at IDT rounds today with Case Management, Nursing, Nursing leadership, and other members of the IDT team.    Consultants/Specialty  general surgery    Code Status  Comfort Care/DNR    Disposition  The patient is not medically cleared for discharge to home or a post-acute facility.      I have placed the appropriate orders for post-discharge needs.    Review of Systems  ROS     Physical Exam  Temp:  [36.1 °C (97 °F)-36.2 °C (97.1 °F)] 36.1 °C (97 °F)  Pulse:  [] 109  Resp:  [14-20] 14  BP: ()/(56-75) 84/56  SpO2:  [90 %-95 %] 92 %    Physical Exam    Fluids    Intake/Output Summary (Last 24 hours) at 6/20/2025 1227  Last data filed at 6/20/2025 0336  Gross per 24 hour   Intake 1000 ml   Output --   Net 1000 ml        Laboratory  Recent Labs      06/20/25  0340   WBC 21.4*   RBC 4.37*   HEMOGLOBIN 11.6*   HEMATOCRIT 34.8*   MCV 79.6*   MCH 26.5*   MCHC 33.3   RDW 51.0*   PLATELETCT 174   MPV 11.7     Recent Labs     06/20/25  0340   SODIUM 138   POTASSIUM 4.2   CHLORIDE 104   CO2 18*   GLUCOSE 160*   BUN 33*   CREATININE 0.88   CALCIUM 9.1     Recent Labs     06/20/25  0340   INR 1.38*               Imaging      Assessment/Plan  * Comfort measures only status- (present on admission)  Assessment & Plan  history of CLL, osteomyelitis on suppression antibiotics, history of G-tube   comes in with a duodenal volvulus.  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 In review of yesterday's note (6/19/2025), there are no changes except as documented above.

## 2025-06-21 NOTE — PROGRESS NOTES
Patient is comfort care and I am unable to complete a two RN 4 eyes assessment due to patient comfort.

## 2025-06-21 NOTE — CARE PLAN
The patient is Unstable - High likelihood or risk of patient condition declining or worsening    Shift Goals  Clinical Goals: Patient will rest comfortably throughout the shift  Patient Goals: Patient will rest comfortablythroughout the shift  Family Goals: none present    Progress made toward(s) clinical / shift goals:      Problem: Knowledge Deficit - Standard  Goal: Patient and family/care givers will demonstrate understanding of plan of care, disease process/condition, diagnostic tests and medications  Outcome: Progressing   Patient oriented x4. Patient updated on POC throughout the shift and verbalizes understanding. Patient verbalizes understanding of notifying staff of pain or discomfort. Questions answered at this time.     Problem: Pain - Standard  Goal: Alleviation of pain or a reduction in pain to the patient’s comfort goal  Outcome: Progressing   Patient medicated per MAR for pain. Patient states improvement in pain after interventions. Patient resting comfortably throughout the shift.     Patient is not progressing towards the following goals: N/a

## 2025-06-21 NOTE — PROGRESS NOTES
Hospital Medicine Daily Progress Note    Date of Service  6/21/2025    Chief Complaint  Wally Joel is a 85 y.o. male admitted 6/20/2025 with coffee-ground emesis    Hospital Course  Wally Joel is an 85-year-old male with PMHx CLL, skull osteomyelitis s/p left ear auriculectomy, parotidectomy and left temporal bone resection with left facial nerve sacrifice who is on antibiotics for lifelong suppression.  Admitted 6/20 for coffee-ground emesis.    Patient has had declining functional and health status for many months.  He is now coming in with bloody emesis.  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  CT abdomen concerning for duodenal volvulus.  General surgery was consulted.  Given patient's extensive medical history and comorbidities, not acute abdomen and relatively good pain control-previous providers and patient have elected to pursue comfort measures alone.    Hospice referral was placed.  However, there does not appear to be advanced directives in place.  No obvious next of kin to make decisions on behalf of patient.    Interval Problem Update  6/21: Patient is resting comfortably.  No family at bedside at this time.  Intermittently alert and oriented.  However, on my exam today patient has significant apnea.  He is not responding to gentle touch or voice.    I have discussed this patient's plan of care and discharge plan at IDT rounds today with Case Management, Nursing, Nursing leadership, and other members of the IDT team.    Consultants/Specialty  general surgery and palliative care    Code Status  Comfort Care/DNR    Disposition  The patient is medically cleared for discharge to home or a post-acute facility.  Anticipate discharge to: hospice    I have placed the appropriate orders for post-discharge needs.    Review of Systems  Review of Systems   Unable to perform ROS: Acuity of condition        Physical Exam  Temp:  [36.3 °C (97.4 °F)-36.7 °C (98 °F)] 36.3 °C (97.4 °F)  Pulse:   [107-110] 110  Resp:  [12-13] 12  BP: (85-93)/(53-56) 85/53  SpO2:  [77 %-86 %] 77 %    Physical Exam  Vitals and nursing note reviewed.   Constitutional:       General: He is sleeping. He is not in acute distress.     Appearance: He is cachectic. He is ill-appearing.   Cardiovascular:      Rate and Rhythm: Normal rate and regular rhythm.   Pulmonary:      Effort: Pulmonary effort is normal.      Breath sounds: Normal breath sounds.      Comments: Apneic breathing  Skin:     General: Skin is warm and dry.      Coloration: Skin is pale.   Neurological:      Mental Status: He is lethargic.      Comments: Nonverbal  Not arousable         Fluids    Intake/Output Summary (Last 24 hours) at 6/21/2025 1213  Last data filed at 6/21/2025 0800  Gross per 24 hour   Intake 0 ml   Output 25 ml   Net -25 ml        Laboratory  Recent Labs     06/20/25  0340   WBC 21.4*   RBC 4.37*   HEMOGLOBIN 11.6*   HEMATOCRIT 34.8*   MCV 79.6*   MCH 26.5*   MCHC 33.3   RDW 51.0*   PLATELETCT 174   MPV 11.7     Recent Labs     06/20/25  0340   SODIUM 138   POTASSIUM 4.2   CHLORIDE 104   CO2 18*   GLUCOSE 160*   BUN 33*   CREATININE 0.88   CALCIUM 9.1     Recent Labs     06/20/25  0340   INR 1.38*               Imaging  [id number] WITH   Final Result         1.  Metallic density along the anterior aspect of the left globe, appearance favoring metallic foreign body, may be penetrating the globe.   2.  Mild bilateral maxillary sinusitis changes.      These findings were discussed with the patient's clinician, Rui Rutherford, on 6/20/2025 5:48 AM.      CT-HEAD W/O   Final Result         1.  No acute intracranial abnormality is identified, there are nonspecific white matter changes, commonly associated with small vessel ischemic disease.  Associated mild cerebral atrophy is noted.   2.  Metallic density anterior to the left globe, appearance compatible with foreign body, correlate with history and exam.   3.  Bilateral sphenoid sinusitis  changes   4.  Atherosclerosis.               CTA ABDOMEN PELVIS W & W/O POST PROCESS   Final Result         1.  No active GI bleed identified.   2.  Swirled appearance of the gastric pylorus and first duodenal segment, appearance concerning for component of duodenal volvulus, distention of the stomach suggests component of gastric outlet obstructive changes.   3.  Diffuse esophageal wall thickening with mucosal enhancement, appearance suggests changes of esophagitis.   4.  Right adrenal nodule, follow-up adrenal protocol CT or MRI for further characterization as clinically appropriate.   5.  Right lateral abdominal wall hernia containing large and small bowel loops.   6.  Diffuse bladder wall thickening, consider changes of cystitis, correlate with urinalysis.   7.  Air within the bladder, could represent changes of recent instrumentation, otherwise consider emphysematous cystitis.   8.  Markedly enlarged prostate, workup and evaluation for causes of prostate enlargement recommended as clinically appropriate.   9.  Hazy linear densities in the right lung base, appearance suggests atelectasis.      These findings were discussed with the patient's clinician, Rui Rutherford, on 6/20/2025 5:34 AM.      DX-CHEST-PORTABLE (1 VIEW)   Final Result         1.  No acute cardiopulmonary disease.           Assessment/Plan  * Comfort measures only status- (present on admission)  Assessment & Plan  history of CLL, osteomyelitis on suppression antibiotics, history of G-tube   comes in with a duodenal volvulus.  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 In review of yesterday's note (6/20/2025), there are no changes except as documented above.

## 2025-06-21 NOTE — PROGRESS NOTES
"Late chart: Patient arrived to unit this AM with transport. Very lethargic but opened eyes to voice. Denied pain and stated, \"I'm fine\". Declined turns or skin assessment. Resting comfortably throughout the shift. Sleeping with unlabored respirations and relaxed body posturing. Frequent assessment in place. Q2 turns not completed due to imminence of passing.   "

## 2025-06-21 NOTE — DISCHARGE PLANNING
Case Management Discharge Planning    Admission Date: 6/20/2025  GMLOS: 4.5  ALOS: 1    6-Clicks ADL Score:    6-Clicks Mobility Score:        Anticipated Discharge Dispo: Discharge Disposition: D/T to hospice home (50)    DME Needed: No    Action(s) Taken: Spoke with Savi, she is DPOA, she is on her way to the hospital and has a copy of DPOA paperwork. Discussed placement options, quant ordered yesterday and pending. [facility] hosp[facility] is another placement option, referral sent. [facility] Board has to approve house prior to acceptance, will follow up on Monday.     Obtained hospice choice and faxed to DPA, scanned DPOA paperwork into media.     Escalations Completed: None    Medically Clear: Yes    Next Steps: Pending placement.

## 2025-06-21 NOTE — PROGRESS NOTES
4 Eyes Skin Assessment Completed by Yamileth RICARDO RN and Teena RN.    Skin assessment is primarily focused on high risk bony prominences. Pay special attention to skin beneath and around medical devices, high risk bony prominences, skin to skin areas and areas where the patient lacks sensation to feel pain and areas where the patient previously had breakdown.     Head (Occipital):  Red, Yellow, and Edema to left eye    Ears (Under Medical Devices): WDL and left ear previously surgically removed    Nose (Under Medical Devices): WDL   Mouth:  WDL   Neck: Scab, Pink, Red, and Blanching   Breast/Chest:  Scar   Shoulder Blades:  Pink and Blanching   Spine:   WDL   (R) Arm/Elbow/Hand: Scab, Red, Blanching, and Bruising   (L) Arm/Elbow/Hand: Scab, Red, Blanching, and Bruising   Abdomen: Open wound, Red, Blanching, Excoriation/scratched, and Moisture   Pannus/Groin:  Open wound, Red, and Excoriation/scratched to penis    Sacrum/Coccyx:   **Wound/discoloration of bony prominence (Suspected Pressure injury)**, Open wound, Red, Purple/maroon, Non-Blanching, and Excoriation/scratched   (R) Ischial Tuberosity (Sit Bones):  Red and Blanching   (L) Ischial Tuberosity (Sit Bones):  Red and Blanching   (R) Leg:  Scab, Pink, Blanching, and Bruising   (L) Leg:  Scab, Pink, Blanching, and Bruising   (R) Heel:  Red and Blanching   (R) Foot/Toe: Red, Blanching, and Bruising   (L) Heel: Red and Blanching   (L) Foot/Toe:  Red, Blanching, and Bruising       DEVICES IN USE:   Respiratory Devices:  NA, patient on room air  Feeding Devices:  N/A   Lines & BP Monitoring Devices:  Peripheral IV    Orthopedic Devices:  N/A  Miscellaneous Devices:  Chen    PROTOCOL INTERVENTIONS:   Standard/Trauma Bed:  Already in place  Offloading Dressing - Sacrum:  Applied this assessment  Offloading Dressing - Heel:  Applied this assessment  Q2 Turns with Pillows:  Applied this assessment  Chen:  Already in place    WOUND PHOTOS:   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

## 2025-06-22 PROBLEM — D64.9 CHRONIC ANEMIA: Status: ACTIVE | Noted: 2025-06-22

## 2025-06-22 PROBLEM — D69.6 THROMBOCYTOPENIA (HCC): Status: ACTIVE | Noted: 2025-06-22

## 2025-06-22 PROBLEM — E27.9 ADRENAL NODULE (HCC): Status: ACTIVE | Noted: 2025-06-22

## 2025-06-22 PROBLEM — G31.9 CEREBRAL ATROPHY (HCC): Status: ACTIVE | Noted: 2025-06-22

## 2025-06-22 PROBLEM — I25.10 ATHEROSCLEROTIC CARDIOVASCULAR DISEASE: Status: ACTIVE | Noted: 2025-06-22

## 2025-06-22 PROBLEM — N18.31 STAGE 3A CHRONIC KIDNEY DISEASE: Status: ACTIVE | Noted: 2025-06-22

## 2025-06-22 PROBLEM — K20.90 ESOPHAGITIS: Status: ACTIVE | Noted: 2025-06-22

## 2025-06-22 PROBLEM — R74.01 TRANSAMINITIS: Status: ACTIVE | Noted: 2025-06-22

## 2025-06-22 PROBLEM — I67.2 ATHEROSCLEROTIC CEREBROVASCULAR DISEASE: Status: ACTIVE | Noted: 2025-06-22

## 2025-06-22 PROBLEM — D04.9 SQUAMOUS CELL CARCINOMA IN SITU (SCCIS) OF SKIN: Status: ACTIVE | Noted: 2025-06-22

## 2025-06-22 LAB
GAMMA INTERFERON BACKGROUND BLD IA-ACNC: 0.02 IU/ML
M TB IFN-G BLD-IMP: NEGATIVE
M TB IFN-G CD4+ BCKGRND COR BLD-ACNC: 0 IU/ML
MITOGEN IGNF BCKGRD COR BLD-ACNC: 2.47 IU/ML
QFT TB2 - NIL TBQ2: 0 IU/ML

## 2025-06-22 NOTE — DISCHARGE SUMMARY
Death Summary    Cause of Death  Acute hypoxic respiratory failure due to multisystem organ failure due to sepsis due to nonoperable duodenal volvulus.    Comorbid Conditions at the Time of Death  Principal Problem:    Comfort measures only status (POA: Yes)  Active Problems:    Chronic lymphocytic leukemia (CLL), B-cell (HCC) (POA: Yes)    Enlarged prostate (POA: Yes)    SCC (squamous cell carcinoma), ear, left (POA: Yes)    Duodenal volvulus (POA: Yes)    Chronic anemia (POA: Unknown)    Thrombocytopenia (HCC) (POA: Unknown)    Atherosclerotic cardiovascular disease (POA: Unknown)    Atherosclerotic cerebrovascular disease (POA: Unknown)    Esophagitis (POA: Unknown)    Adrenal nodule (HCC) (POA: Unknown)    Cerebral atrophy (HCC) (POA: Unknown)    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 *      History of Presenting Illness and Hospital Course  Wally Joel is an 85-year-old male with PMHx CLL, skull osteomyelitis s/p left ear auriculectomy, parotidectomy and left temporal bone resection with left facial nerve sacrifice who is on antibiotics for lifelong suppression.  Admitted 6/20 for coffee-ground emesis.    Patient has had declining functional and health status for many months.  He is now coming in with bloody emesis.  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  CT abdomen concerning for duodenal volvulus.  General surgery was consulted.  Given patient's extensive medical history and comorbidities, not acute abdomen and relatively good pain control-previous providers and patient have elected to pursue comfort measures alone.    Hospice referral was placed.  However, there does not appear to be advanced directives in place.  Patient was able to clearly state his desires for comfort measures only.  Initially, planning for discharge with hospice.   However patient passed peacefully away on 6/21 at 3:51 PM.    Death Date: 06/21/25   Death Time: 1551         Pronounced By (RN1): Parul CARR  Pronounced By (RN2): Parish GOODE

## 2025-06-24 LAB
COMPONENT CELLULAR 8504CLL: NORMAL
COMPONENT CELLULAR 8504CLL: NORMAL

## 2025-06-25 LAB
BACTERIA BLD CULT: NORMAL
BACTERIA BLD CULT: NORMAL
SIGNIFICANT IND 70042: NORMAL
SIGNIFICANT IND 70042: NORMAL
SITE SITE: NORMAL
SITE SITE: NORMAL
SOURCE SOURCE: NORMAL
SOURCE SOURCE: NORMAL

## (undated) DEVICE — SUTURE 3-0 SILK SH C/R 18 IN - (12/BX)

## (undated) DEVICE — SPONGE GAUZESTER 4 X 4 4PLY - (128PK/CA)

## (undated) DEVICE — DRESSING EAR GLASSCOCK ADULT (6EA/BX)

## (undated) DEVICE — KIT ANESTHESIA W/CIRCUIT & 3/LT BAG W/FILTER (20EA/CA)

## (undated) DEVICE — LACTATED RINGERS INJ 1000 ML - (14EA/CA 60CA/PF)

## (undated) DEVICE — GOWN SURGICAL XX-LARGE - (28EA/CA) SIRUS NON REINFORCED

## (undated) DEVICE — PACK MAJOR BASIN - (2EA/CA)

## (undated) DEVICE — SUTURE GENERAL

## (undated) DEVICE — SET EXTENSION WITH 2 PORTS (48EA/CA) ***PART #2C8610 IS A SUBSTITUTE*****

## (undated) DEVICE — SODIUM CHL IRRIGATION 0.9% 1000ML (12EA/CA)

## (undated) DEVICE — SET LEADWIRE 5 LEAD BEDSIDE DISPOSABLE ECG (1SET OF 5/EA)

## (undated) DEVICE — GOWN WARMING STANDARD FLEX - (30/CA)

## (undated) DEVICE — DRESSING TEGADERM 8 X 12 - (10/BX 8BX/CA)

## (undated) DEVICE — BLADE DERMATOME NEW AIR (10/BX)

## (undated) DEVICE — MASK ANESTHESIA ADULT  - (100/CA)

## (undated) DEVICE — PACK LAP CHOLE OR - (2EA/CA)

## (undated) DEVICE — SYRINGE 10 ML CONTROL LL (25EA/BX 4BX/CA)

## (undated) DEVICE — CANISTER SUCTION 3000ML MECHANICAL FILTER AUTO SHUTOFF MEDI-VAC NONSTERILE LF DISP  (40EA/CA)

## (undated) DEVICE — SENSOR SPO2 NEO LNCS ADHESIVE (20/BX) SEE USER NOTES

## (undated) DEVICE — BLADE SURGICAL #15 - (50/BX 3BX/CA)

## (undated) DEVICE — SET IRRIGATION CYSTOSCOPY Y-TYPE L81 IN (20EA/CA)

## (undated) DEVICE — SPONGE PEANUT - (5/PK 50PK/CA)

## (undated) DEVICE — CONNECTOR HOSE NEPTUNE FOR CYSTO ROOM

## (undated) DEVICE — TUBING CLEARLINK DUO-VENT - C-FLO (48EA/CA)

## (undated) DEVICE — SUCTION INSTRUMENT YANKAUER BULBOUS TIP W/O VENT (50EA/CA)

## (undated) DEVICE — ELECTRODE 850 FOAM ADHESIVE - HYDROGEL RADIOTRNSPRNT (50/PK)

## (undated) DEVICE — SUTURE 5-0 ETHILON P-3 18 (12PK/BX)"

## (undated) DEVICE — GLOVE BIOGEL SZ 7.5 SURGICAL PF LTX - (50PR/BX 4BX/CA)

## (undated) DEVICE — NEPTUNE 4 PORT MANIFOLD - (20/PK)

## (undated) DEVICE — TUBE E-T HI-LO CUFF 7.0MM (10EA/PK)

## (undated) DEVICE — MEDICINE CUP STERILE 2 OZ - (100/CA)

## (undated) DEVICE — SPONGE XRAY 8X4 STERL. 12PL - (10EA/TY 80TY/CA)

## (undated) DEVICE — GRAFT MESH SEPRAFILM PRO PACK - 5/BX CONTAINS 6 3X5 PIECES

## (undated) DEVICE — TROCAR LAPSCP 100MM 12MM NTHRD - (6/BX)

## (undated) DEVICE — GLOVE BIOGEL SZ 6.5 SURGICAL PF LTX (50PR/BX 4BX/CA)

## (undated) DEVICE — GLOVE BIOGEL INDICATOR SZ 7.5 SURGICAL PF LTX - (50PR/BX 4BX/CA)

## (undated) DEVICE — SUTURE  0 ETHIBOND CT-1 30 IN (36PK/BX)

## (undated) DEVICE — STERI STRIP COMPOUND BENZOIN - TINCTURE 0.6ML WITH APPLICATOR (40EA/BX)

## (undated) DEVICE — FIBRILLAR SURGICEL 4X4 - 10/CA

## (undated) DEVICE — HEAD HOLDER JUNIOR/ADULT

## (undated) DEVICE — WATER IRRIG. STER 3000 ML - (4/CA)

## (undated) DEVICE — CHLORAPREP 26 ML APPLICATOR - ORANGE TINT(25/CA)

## (undated) DEVICE — TRAY SRGPRP PVP IOD WT PRP - (20/CA)

## (undated) DEVICE — DERMACARRIER 8 (NEW MESHGFT) - (10/BX)

## (undated) DEVICE — STAPLER SKIN DISP - (6/BX 10BX/CA) VISISTAT

## (undated) DEVICE — TROCAR, CAN&SEAL 5X100MM C0509

## (undated) DEVICE — CANNULA O2 COMFORT SOFT EAR ADULT 7 FT TUBING (50/CA)

## (undated) DEVICE — SLEEVE, VASO, THIGH, MED

## (undated) DEVICE — GOWN SURGEONS X-LARGE - DISP. (30/CA)

## (undated) DEVICE — SUTURE 0 COATED VICRYL PLUS - CTX CR(12/BX)18 IN

## (undated) DEVICE — NEEDLE NON SAFETY 25 GA X 1 1/2 IN HYPO (100EA/BX)

## (undated) DEVICE — SUTURE 0 VICRYL PLUS UR-6 - 27 INCH (36/BX)

## (undated) DEVICE — TOWELS CLOTH SURGICAL - (4/PK 20PK/CA)

## (undated) DEVICE — DRAIN PENROSE STERILE 1/4 X - 18 IN  (25EA/BX)

## (undated) DEVICE — TROCAR 5X100 NON BLADED Z-TH - READ KII (6/BX)

## (undated) DEVICE — PAD LAP STERILE 18 X 18 - (5/PK 40PK/CA)

## (undated) DEVICE — TUBE CONNECTING SUCTION - CLEAR PLASTIC STERILE 72 IN (50EA/CA)

## (undated) DEVICE — BANDAID SHEER STRIP 3/4 IN (100EA/BX 12BX/CA)

## (undated) DEVICE — BAG DRAINAGE LINGEMAN CYSTO FOR GE/OEC 2600/2800 TABLES (20EA/CA)

## (undated) DEVICE — SUTURE 5-0 ETHILON FS-2 18 (12PK/BX)"

## (undated) DEVICE — DRESSING NON-ADHERING 8 X 3 - (50/BX)

## (undated) DEVICE — KIT  I.V. START (100EA/CA)

## (undated) DEVICE — TUBE CONNECT SUCTION CLEAR 120 X 1/4" (50EA/CA)"

## (undated) DEVICE — SUTURE 4-0 VICRYL PLUS FS-2 - 27 INCH (36/BX)

## (undated) DEVICE — DEPRESSOR TONGUE ADLT STERILE - 6 IN (100EA/BX)

## (undated) DEVICE — SLEEVE VASO CALF MED - (10PR/CA)

## (undated) DEVICE — PROTECTOR ULNA NERVE - (36PR/CA)

## (undated) DEVICE — SUTURE 4-0 VICRYL PLUSFS-1 - 27 INCH (36/BX)

## (undated) DEVICE — PACK CYSTOSCOPY III - (8/CA)

## (undated) DEVICE — LEAD SET 6 DISP. EKG NIHON KOHDEN (100EA/CA)

## (undated) DEVICE — WATER IRRIG. STER. 1500 ML - (9/CA)

## (undated) DEVICE — SUTURE 4-0 VICRYL PLUS SH - UNDYED 27 INCH (36PK/BX)

## (undated) DEVICE — JELLY, KY 2 0Z STERILE

## (undated) DEVICE — TOWEL STOP TIMEOUT SAFETY FLAG (40EA/CA)

## (undated) DEVICE — LASER FIBER HOLM 910 MICRON 100 WATT (5EA/BX)

## (undated) DEVICE — TUBE E-T HI-LO CUFF 8.0MM (10EA/PK)

## (undated) DEVICE — ELECTRODE DUAL RETURN W/ CORD - (50/PK)

## (undated) DEVICE — CLIP APPLIER 10MM ENDO LARGE (3EA/BX)

## (undated) DEVICE — BLADE SURGICAL CLIPPER - (50EA/CA)

## (undated) DEVICE — CANISTER SUCTION RIGID RED 1500CC (40EA/CA)

## (undated) DEVICE — SENSOR OXIMETER ADULT SPO2 RD SET (20EA/BX)

## (undated) DEVICE — COVER FOOT UNIVERSAL DISP. - (25EA/CA)

## (undated) DEVICE — GLOVE BIOGEL SZ 7 SURGICAL PF LTX - (50PR/BX 4BX/CA)

## (undated) DEVICE — MASK, LARYNGEAL AIRWAY #4

## (undated) DEVICE — GLOVE BIOGEL INDICATOR SZ 8 SURGICAL PF LTX - (50/BX 4BX/CA)

## (undated) DEVICE — KIT ROOM DECONTAMINATION

## (undated) DEVICE — TUBING INSUFFLATION - (10/BX)

## (undated) DEVICE — DRAPE MAGNETIC (INSTRA-MAG) - (30/CA)

## (undated) DEVICE — SUTURE 2-0 SILK FS (12EA/BX)

## (undated) DEVICE — STAPLE 45MM VASCULAR WHITE 2.5MM (12EA/BX)

## (undated) DEVICE — DEVICE 5MM ABSRB STRAP FIXATION  (6EA/BX)

## (undated) DEVICE — GOWN SURGICAL X-LARGE ULTRA - FILM-REINFORCED (20/CA)

## (undated) DEVICE — WATER IRRIGATION STERILE 1000ML (12EA/CA)

## (undated) DEVICE — TRAY SKIN SCRUB PVP WET (20EA/CA) PART #DYND70356 DISCONTINUED

## (undated) DEVICE — CATHETER URETHRAL FOLEY SILICONE OD18 FR 10 ML (10EA/CA)

## (undated) DEVICE — BOVIE NEEDLE TIP INSULATD NON-SAFETY 2CM (50/PK)

## (undated) DEVICE — SUTURE 3-0 VICRYL PLUS SH - 8X 18 INCH (12/BX)

## (undated) DEVICE — GLOVE BIOGEL PI INDICATOR SZ 6.5 SURGICAL PF LF - (50/BX 4BX/CA)

## (undated) DEVICE — PACK ENT OR - (2EA/CA)

## (undated) DEVICE — BAG DRAINAGE ANTIREFLUX TOWER SLIDE TAP 4000 ML (20EA/CA)

## (undated) DEVICE — BAG URODRAIN WITH TUBING - (20/CA)

## (undated) DEVICE — SUTURE 3-0 VICRYL PLUS SH - 27 INCH (36/BX)

## (undated) DEVICE — GOWN SURGEONS LARGE - (32/CA)

## (undated) DEVICE — MASK OXYGEN VNYL ADLT MED CONC WITH 7 FOOT TUBING  - (50EA/CA)

## (undated) DEVICE — DRESSING PETROLEUM GAUZE 5 X 9" (50EA/BX 4BX/CA)"

## (undated) DEVICE — CLOSURE SKIN STRIP 1/2 X 4 IN - (STERI STRIP) (50/BX 4BX/CA)

## (undated) DEVICE — GLOVE BIOGEL INDICATOR SZ 6.5 SURGICAL PF LTX - (50PR/BX 4BX/CA)